# Patient Record
Sex: FEMALE | Race: BLACK OR AFRICAN AMERICAN | NOT HISPANIC OR LATINO | Employment: FULL TIME | URBAN - METROPOLITAN AREA
[De-identification: names, ages, dates, MRNs, and addresses within clinical notes are randomized per-mention and may not be internally consistent; named-entity substitution may affect disease eponyms.]

---

## 2021-10-28 ENCOUNTER — OFFICE VISIT (OUTPATIENT)
Dept: OBSTETRICS AND GYNECOLOGY | Facility: CLINIC | Age: 31
End: 2021-10-28
Payer: COMMERCIAL

## 2021-10-28 VITALS — DIASTOLIC BLOOD PRESSURE: 84 MMHG | WEIGHT: 259.69 LBS | SYSTOLIC BLOOD PRESSURE: 132 MMHG

## 2021-10-28 DIAGNOSIS — Z12.39 SCREENING BREAST EXAMINATION: ICD-10-CM

## 2021-10-28 DIAGNOSIS — N83.209 CYST OF OVARY, UNSPECIFIED LATERALITY: ICD-10-CM

## 2021-10-28 DIAGNOSIS — Z01.419 ENCOUNTER FOR GYNECOLOGICAL EXAMINATION WITHOUT ABNORMAL FINDING: Primary | ICD-10-CM

## 2021-10-28 PROCEDURE — 99385 PR PREVENTIVE VISIT,NEW,18-39: ICD-10-PCS | Mod: S$GLB,,, | Performed by: OBSTETRICS & GYNECOLOGY

## 2021-10-28 PROCEDURE — 87624 HPV HI-RISK TYP POOLED RSLT: CPT | Performed by: OBSTETRICS & GYNECOLOGY

## 2021-10-28 PROCEDURE — 99999 PR PBB SHADOW E&M-NEW PATIENT-LVL III: ICD-10-PCS | Mod: PBBFAC,,, | Performed by: OBSTETRICS & GYNECOLOGY

## 2021-10-28 PROCEDURE — 99999 PR PBB SHADOW E&M-NEW PATIENT-LVL III: CPT | Mod: PBBFAC,,, | Performed by: OBSTETRICS & GYNECOLOGY

## 2021-10-28 PROCEDURE — 99385 PREV VISIT NEW AGE 18-39: CPT | Mod: S$GLB,,, | Performed by: OBSTETRICS & GYNECOLOGY

## 2021-10-28 PROCEDURE — 88175 CYTOPATH C/V AUTO FLUID REDO: CPT | Performed by: OBSTETRICS & GYNECOLOGY

## 2021-10-28 RX ORDER — ESZOPICLONE 3 MG/1
3 TABLET, FILM COATED ORAL NIGHTLY PRN
COMMUNITY
Start: 2021-06-22 | End: 2022-10-12

## 2021-10-28 RX ORDER — DULOXETIN HYDROCHLORIDE 60 MG/1
120 CAPSULE, DELAYED RELEASE ORAL
COMMUNITY
Start: 2021-07-23

## 2021-11-02 ENCOUNTER — PATIENT MESSAGE (OUTPATIENT)
Dept: OBSTETRICS AND GYNECOLOGY | Facility: CLINIC | Age: 31
End: 2021-11-02
Payer: COMMERCIAL

## 2021-11-02 DIAGNOSIS — D25.9 UTERINE LEIOMYOMA, UNSPECIFIED LOCATION: Primary | ICD-10-CM

## 2021-11-02 RX ORDER — DROSPIRENONE AND ETHINYL ESTRADIOL 0.03MG-3MG
1 KIT ORAL DAILY
Qty: 28 TABLET | Refills: 11 | Status: SHIPPED | OUTPATIENT
Start: 2021-11-02 | End: 2022-11-10 | Stop reason: SDUPTHER

## 2021-11-08 ENCOUNTER — PATIENT MESSAGE (OUTPATIENT)
Dept: OBSTETRICS AND GYNECOLOGY | Facility: CLINIC | Age: 31
End: 2021-11-08
Payer: COMMERCIAL

## 2021-11-09 ENCOUNTER — PATIENT MESSAGE (OUTPATIENT)
Dept: OBSTETRICS AND GYNECOLOGY | Facility: CLINIC | Age: 31
End: 2021-11-09
Payer: COMMERCIAL

## 2021-12-13 ENCOUNTER — LAB VISIT (OUTPATIENT)
Dept: LAB | Facility: HOSPITAL | Age: 31
End: 2021-12-13
Attending: OBSTETRICS & GYNECOLOGY
Payer: COMMERCIAL

## 2021-12-13 ENCOUNTER — OFFICE VISIT (OUTPATIENT)
Dept: OBSTETRICS AND GYNECOLOGY | Facility: CLINIC | Age: 31
End: 2021-12-13
Payer: COMMERCIAL

## 2021-12-13 VITALS
WEIGHT: 260.38 LBS | BODY MASS INDEX: 41.85 KG/M2 | HEIGHT: 66 IN | DIASTOLIC BLOOD PRESSURE: 64 MMHG | SYSTOLIC BLOOD PRESSURE: 110 MMHG

## 2021-12-13 DIAGNOSIS — Z11.3 SCREEN FOR STD (SEXUALLY TRANSMITTED DISEASE): ICD-10-CM

## 2021-12-13 DIAGNOSIS — Z11.3 SCREEN FOR STD (SEXUALLY TRANSMITTED DISEASE): Primary | ICD-10-CM

## 2021-12-13 PROCEDURE — 99213 OFFICE O/P EST LOW 20 MIN: CPT | Mod: S$GLB,,, | Performed by: OBSTETRICS & GYNECOLOGY

## 2021-12-13 PROCEDURE — 86803 HEPATITIS C AB TEST: CPT | Performed by: OBSTETRICS & GYNECOLOGY

## 2021-12-13 PROCEDURE — 87591 N.GONORRHOEAE DNA AMP PROB: CPT | Performed by: OBSTETRICS & GYNECOLOGY

## 2021-12-13 PROCEDURE — 87491 CHLMYD TRACH DNA AMP PROBE: CPT | Mod: 59 | Performed by: OBSTETRICS & GYNECOLOGY

## 2021-12-13 PROCEDURE — 87389 HIV-1 AG W/HIV-1&-2 AB AG IA: CPT | Performed by: OBSTETRICS & GYNECOLOGY

## 2021-12-13 PROCEDURE — 86592 SYPHILIS TEST NON-TREP QUAL: CPT | Performed by: OBSTETRICS & GYNECOLOGY

## 2021-12-13 PROCEDURE — 99213 PR OFFICE/OUTPT VISIT, EST, LEVL III, 20-29 MIN: ICD-10-PCS | Mod: S$GLB,,, | Performed by: OBSTETRICS & GYNECOLOGY

## 2021-12-13 PROCEDURE — 87481 CANDIDA DNA AMP PROBE: CPT | Mod: 59 | Performed by: OBSTETRICS & GYNECOLOGY

## 2021-12-13 PROCEDURE — 99999 PR PBB SHADOW E&M-EST. PATIENT-LVL III: CPT | Mod: PBBFAC,,, | Performed by: OBSTETRICS & GYNECOLOGY

## 2021-12-13 PROCEDURE — 99999 PR PBB SHADOW E&M-EST. PATIENT-LVL III: ICD-10-PCS | Mod: PBBFAC,,, | Performed by: OBSTETRICS & GYNECOLOGY

## 2021-12-13 PROCEDURE — 87340 HEPATITIS B SURFACE AG IA: CPT | Performed by: OBSTETRICS & GYNECOLOGY

## 2021-12-13 PROCEDURE — 36415 COLL VENOUS BLD VENIPUNCTURE: CPT | Performed by: OBSTETRICS & GYNECOLOGY

## 2021-12-14 LAB
HBV SURFACE AG SERPL QL IA: NEGATIVE
HCV AB SERPL QL IA: NEGATIVE
HIV 1+2 AB+HIV1 P24 AG SERPL QL IA: NEGATIVE
RPR SER QL: NORMAL

## 2021-12-16 ENCOUNTER — PATIENT MESSAGE (OUTPATIENT)
Dept: OBSTETRICS AND GYNECOLOGY | Facility: CLINIC | Age: 31
End: 2021-12-16
Payer: COMMERCIAL

## 2021-12-16 DIAGNOSIS — B37.9 YEAST INFECTION: Primary | ICD-10-CM

## 2021-12-16 LAB
BACTERIAL VAGINOSIS DNA: NEGATIVE
CANDIDA GLABRATA DNA: NEGATIVE
CANDIDA KRUSEI DNA: NEGATIVE
CANDIDA RRNA VAG QL PROBE: POSITIVE
T VAGINALIS RRNA GENITAL QL PROBE: NEGATIVE

## 2021-12-16 RX ORDER — FLUCONAZOLE 150 MG/1
150 TABLET ORAL DAILY
Qty: 1 TABLET | Refills: 0 | Status: SHIPPED | OUTPATIENT
Start: 2021-12-16 | End: 2021-12-17

## 2021-12-17 LAB
C TRACH DNA SPEC QL NAA+PROBE: NOT DETECTED
N GONORRHOEA DNA SPEC QL NAA+PROBE: NOT DETECTED

## 2022-07-06 ENCOUNTER — HOSPITAL ENCOUNTER (EMERGENCY)
Facility: HOSPITAL | Age: 32
Discharge: HOME | End: 2022-07-06
Attending: EMERGENCY MEDICINE
Payer: MEDICAID

## 2022-07-06 VITALS
WEIGHT: 270 LBS | RESPIRATION RATE: 18 BRPM | DIASTOLIC BLOOD PRESSURE: 74 MMHG | HEIGHT: 66 IN | BODY MASS INDEX: 43.39 KG/M2 | HEART RATE: 86 BPM | OXYGEN SATURATION: 98 % | SYSTOLIC BLOOD PRESSURE: 129 MMHG | TEMPERATURE: 98 F

## 2022-07-06 DIAGNOSIS — R51.9 ACUTE INTRACTABLE HEADACHE, UNSPECIFIED HEADACHE TYPE: Primary | ICD-10-CM

## 2022-07-06 LAB
B-HCG UR QL: NEGATIVE
CTP QC/QA: YES

## 2022-07-06 PROCEDURE — 63600175 PHARM REV CODE 636 W HCPCS

## 2022-07-06 PROCEDURE — 81025 URINE PREGNANCY TEST: CPT | Performed by: EMERGENCY MEDICINE

## 2022-07-06 PROCEDURE — 96375 TX/PRO/DX INJ NEW DRUG ADDON: CPT | Mod: 59

## 2022-07-06 PROCEDURE — 96361 HYDRATE IV INFUSION ADD-ON: CPT | Mod: 59

## 2022-07-06 PROCEDURE — 99285 EMERGENCY DEPT VISIT HI MDM: CPT | Mod: 25

## 2022-07-06 PROCEDURE — 96374 THER/PROPH/DIAG INJ IV PUSH: CPT

## 2022-07-06 PROCEDURE — 25000003 PHARM REV CODE 250

## 2022-07-06 RX ORDER — PROCHLORPERAZINE EDISYLATE 5 MG/ML
10 INJECTION INTRAMUSCULAR; INTRAVENOUS
Status: COMPLETED | OUTPATIENT
Start: 2022-07-06 | End: 2022-07-06

## 2022-07-06 RX ORDER — BUTALBITAL, ACETAMINOPHEN AND CAFFEINE 50; 325; 40 MG/1; MG/1; MG/1
1 TABLET ORAL EVERY 4 HOURS PRN
Qty: 60 TABLET | Refills: 0 | Status: SHIPPED | OUTPATIENT
Start: 2022-07-06 | End: 2022-07-16

## 2022-07-06 RX ORDER — DIPHENHYDRAMINE HYDROCHLORIDE 50 MG/ML
25 INJECTION INTRAMUSCULAR; INTRAVENOUS
Status: COMPLETED | OUTPATIENT
Start: 2022-07-06 | End: 2022-07-06

## 2022-07-06 RX ORDER — KETOROLAC TROMETHAMINE 30 MG/ML
15 INJECTION, SOLUTION INTRAMUSCULAR; INTRAVENOUS
Status: COMPLETED | OUTPATIENT
Start: 2022-07-06 | End: 2022-07-06

## 2022-07-06 RX ORDER — ONDANSETRON 4 MG/1
4 TABLET, ORALLY DISINTEGRATING ORAL EVERY 6 HOURS PRN
Qty: 40 TABLET | Refills: 0 | Status: SHIPPED | OUTPATIENT
Start: 2022-07-06 | End: 2022-10-12

## 2022-07-06 RX ADMIN — SODIUM CHLORIDE 1000 ML: 0.9 INJECTION, SOLUTION INTRAVENOUS at 05:07

## 2022-07-06 RX ADMIN — DIPHENHYDRAMINE HYDROCHLORIDE 25 MG: 50 INJECTION, SOLUTION INTRAMUSCULAR; INTRAVENOUS at 05:07

## 2022-07-06 RX ADMIN — KETOROLAC TROMETHAMINE 15 MG: 30 INJECTION, SOLUTION INTRAMUSCULAR at 06:07

## 2022-07-06 RX ADMIN — PROCHLORPERAZINE EDISYLATE 10 MG: 5 INJECTION INTRAMUSCULAR; INTRAVENOUS at 05:07

## 2022-07-06 NOTE — DISCHARGE INSTRUCTIONS

## 2022-07-06 NOTE — FIRST PROVIDER EVALUATION
Emergency Department TeleTriage Encounter Note      CHIEF COMPLAINT    Chief Complaint   Patient presents with    Headache     Patient reports a headache that started x 4 days, nausea, shakiness, diaphoresis and sensitivity to lights/sounds. Patient denies vision changes, vomiting. Patient reports hx of migraines. Patient states that she took Excedrin migraine this morning and Goody's Powder about an hour pta.        VITAL SIGNS   Initial Vitals [07/06/22 1416]   BP Pulse Resp Temp SpO2   (!) 194/87 90 18 98.2 °F (36.8 °C) 98 %      MAP       --            ALLERGIES    Review of patient's allergies indicates:  No Known Allergies    PROVIDER TRIAGE NOTE  This is a teletriage evaluation of a 32 y.o. female presenting to the ED complaining of migraine. Patient reports headache that started 4 days ago. She has nausea, shakiness, and photophobia. She has history of migraines. This one is similar. She denies numbness, tingling, or weakness.    Initial orders will be placed and care will be transferred to an alternate provider when patient is roomed for a full evaluation. Any additional orders and the final disposition will be determined by that provider.           ORDERS  Labs Reviewed   POCT URINE PREGNANCY       ED Orders (720h ago, onward)    Start Ordered     Status Ordering Provider    07/06/22 1421 07/06/22 1420  POCT urine pregnancy  Once         Ordered YAZAN SOMMER            Virtual Visit Note: The provider triage portion of this emergency department evaluation and documentation was performed via [employer], a HIPAA-compliant telemedicine application, in concert with a tele-presenter in the room. A face to face patient evaluation with one of my colleagues will occur once the patient is placed in an emergency department room.      DISCLAIMER: This note was prepared with [employer] voice recognition transcription software. Garbled syntax, mangled pronouns, and other bizarre constructions may be attributed  to that software system.

## 2022-07-06 NOTE — ED PROVIDER NOTES
Encounter Date: 7/6/2022       History     Chief Complaint   Patient presents with    Headache     Patient reports a headache that started x 4 days, nausea, shakiness, diaphoresis and sensitivity to lights/sounds. Patient denies vision changes, vomiting. Patient reports hx of migraines. Patient states that she took Excedrin migraine this morning and Goody's Powder about an hour pta.      32-year-old female with past medical history of migraines presents the ED complaining of acute onset headache that started x3 days ago.  Patient is reporting pain behind her right eye with associated photophobia and phonophobia.  She states this feels like her usual migraines however worsening in intensity and has not resolved.  Patient has attempted Excedrin and goodies powder ( last dose 2-3 hrs PTA) with mild relief.  Patient denies nausea, blurred vision, fever, chills, cough, abdominal pain, emesis, diarrhea, dysuria, hematuria, shortness of breath, or chest pain.  No other symptoms reported.    The history is provided by the patient. No  was used.     Review of patient's allergies indicates:  No Known Allergies  Past Medical History:   Diagnosis Date    Anxiety      History reviewed. No pertinent surgical history.  History reviewed. No pertinent family history.  Social History     Tobacco Use    Smoking status: Never Smoker    Smokeless tobacco: Never Used   Substance Use Topics    Drug use: Never     Review of Systems   Constitutional: Negative for chills and fever.   HENT: Negative for congestion, ear pain, rhinorrhea and sore throat.    Eyes: Positive for photophobia. Negative for redness and visual disturbance.   Respiratory: Negative for cough and shortness of breath.    Cardiovascular: Negative for chest pain.   Gastrointestinal: Negative for abdominal pain, diarrhea, nausea and vomiting.   Genitourinary: Negative for decreased urine volume, difficulty urinating, dysuria, frequency, hematuria and  urgency.   Musculoskeletal: Negative for back pain and neck pain.   Skin: Negative for rash.   Neurological: Positive for headaches. Negative for dizziness, syncope and light-headedness.   Psychiatric/Behavioral: Negative for confusion.       Physical Exam     Initial Vitals [07/06/22 1416]   BP Pulse Resp Temp SpO2   (!) 194/87 90 18 98.2 °F (36.8 °C) 98 %      MAP       --         Physical Exam    Nursing note and vitals reviewed.  Constitutional: She appears well-developed and well-nourished.  Non-toxic appearance. She does not appear ill.   HENT:   Head: Normocephalic and atraumatic.   Mouth/Throat: Mucous membranes are normal.   Eyes: Conjunctivae and EOM are normal. Pupils are equal, round, and reactive to light.   Neck: Neck supple.   Normal range of motion.   Full passive range of motion without pain.     Cardiovascular: Normal rate and regular rhythm.   Pulmonary/Chest: Effort normal and breath sounds normal. No respiratory distress.   Abdominal: Abdomen is soft. Bowel sounds are normal. She exhibits no distension. There is no abdominal tenderness.   Musculoskeletal:         General: Normal range of motion.      Cervical back: Full passive range of motion without pain, normal range of motion and neck supple. No rigidity.     Neurological: She is alert. No cranial nerve deficit.   Neuro intact.  Strength and sensation intact to bilateral upper extremities and bilateral lower extremities.   Skin: Skin is warm and dry.   Psychiatric: She has a normal mood and affect.         ED Course   Procedures  Labs Reviewed   POCT URINE PREGNANCY          Imaging Results          CT Head Without Contrast (Final result)  Result time 07/06/22 18:28:20    Final result by Adam De MD (07/06/22 18:28:20)                 Impression:      No acute process.  Follow-up, as clinically warranted.      Electronically signed by: Adam De MD  Date:    07/06/2022  Time:    18:28             Narrative:    EXAMINATION:  CT HEAD  WITHOUT CONTRAST    CLINICAL HISTORY:  Headache, sudden, severe;    TECHNIQUE:  Low dose axial images were obtained through the head.  Coronal and sagittal reformations were also performed. Contrast was not administered.    COMPARISON:  None.    FINDINGS:  The subcutaneous tissues are unremarkable.  The bony calvarium is intact.  The paranasal sinuses are unremarkable.  The mastoid air cells are clear.  The orbits and intraorbital contents are unremarkable.    The craniocervical junction is intact.  The midline structures are unremarkable.  There are no extra-axial fluid collections.  There is no evidence of intracranial hemorrhage.  The ventricles and sulci are within normal limits.  The cisterns are unremarkable.  The gray-white differentiation is maintained.  There is no dense vessel sign.  There is no evidence of mass effect.                                 Medications   prochlorperazine injection Soln 10 mg (10 mg Intravenous Given 7/6/22 1736)   diphenhydrAMINE injection 25 mg (25 mg Intravenous Given 7/6/22 1736)   sodium chloride 0.9% bolus 1,000 mL (0 mLs Intravenous Stopped 7/6/22 1825)   ketorolac injection 15 mg (15 mg Intravenous Given 7/6/22 1830)     Medical Decision Making:   ED Management:  This is a 32-year-old female with past medical history of migraines presents the ED complaining of acute onset headache that started x3 days ago.  Patient is reporting pain behind her right eye with associated photophobia and phonophobia.  On physical exam, patient is well-appearing and in no acute distress.  Nontoxic appearing. Neuro intact.  Strength and sensation intact to bilateral upper extremities and bilateral lower extremities.      Differentials including but not limited to:  Subarachnoid hemorrhage, migraine headache, cluster headache, tension headache, temporal arteritis,    UPT negative.  CT head pending.  Ordered Compazine and Benadryl for pain and 1 L of fluids..  Will reassess.  Upon reassessment,  patient still reports pain.  Ordered Toradol.  CT head revealed no evidence of subarachnoid hemorrhage.  No acute processes.  Will discharge patient on Fioricet for migraine headache and Zofran as needed for nausea.    Strict return precautions given. I discussed with the patient/family the diagnosis, treatment plan, indications for return to the emergency department, and for expected follow-up. The patient/family verbalized an understanding. The patient/family is asked if there are any questions or concerns. We discuss the case, until all issues are addressed to the patient/familys satisfaction. Patient/family understands and is agreeable to the plan. Patient is stable and ready for discharge.                       Clinical Impression:   Final diagnoses:  [R51.9] Acute intractable headache, unspecified headache type (Primary)          ED Disposition Condition    Discharge Stable        ED Prescriptions     Medication Sig Dispense Start Date End Date Auth. Provider    butalbital-acetaminophen-caffeine -40 mg (FIORICET, ESGIC) -40 mg per tablet Take 1 tablet by mouth every 4 (four) hours as needed for Pain. 60 tablet 7/6/2022 7/16/2022 Stacy Huertas PA-C    ondansetron (ZOFRAN-ODT) 4 MG TbDL Take 1 tablet (4 mg total) by mouth every 6 (six) hours as needed (nausea). 40 tablet 7/6/2022  Stacy Huertas PA-C        Follow-up Information     Follow up With Specialties Details Why Contact Info    [facility]  Schedule an appointment as soon as possible for a visit in 2 days for further evaluation [address] [place] [address]  [phone]      [facility] Emergency Medicine In 2 days If symptoms worsen [address] [facility] [id number] [phone]           Stacy Heurtas PA-C  07/06/22 0834

## 2022-07-06 NOTE — Clinical Note
"Stephan "Stephan" Madison was seen and treated in our emergency department on 7/6/2022.  She may return to work on 07/07/2022.       If you have any questions or concerns, please don't hesitate to call.      Stacy Huertas PA-C"

## 2022-10-12 ENCOUNTER — OFFICE VISIT (OUTPATIENT)
Dept: FAMILY MEDICINE | Facility: CLINIC | Age: 32
End: 2022-10-12
Payer: COMMERCIAL

## 2022-10-12 DIAGNOSIS — G43.711 INTRACTABLE CHRONIC MIGRAINE WITHOUT AURA AND WITH STATUS MIGRAINOSUS: Primary | ICD-10-CM

## 2022-10-12 PROCEDURE — 99203 OFFICE O/P NEW LOW 30 MIN: CPT | Mod: 95,,, | Performed by: STUDENT IN AN ORGANIZED HEALTH CARE EDUCATION/TRAINING PROGRAM

## 2022-10-12 PROCEDURE — 99203 PR OFFICE/OUTPT VISIT, NEW, LEVL III, 30-44 MIN: ICD-10-PCS | Mod: 95,,, | Performed by: STUDENT IN AN ORGANIZED HEALTH CARE EDUCATION/TRAINING PROGRAM

## 2022-10-12 RX ORDER — SUMATRIPTAN SUCCINATE 100 MG/1
TABLET ORAL
Qty: 12 TABLET | Refills: 3 | Status: SHIPPED | OUTPATIENT
Start: 2022-10-12 | End: 2023-01-25

## 2022-10-12 NOTE — PROGRESS NOTES
10/12/2022    Stephan Wallace  [id number]    CC: headache    HPI    Migraine present for the last couple of days. Is on a search to find a neurologist but needs a new neurologist. Ha stried excedrin migraine but not working. Needs refill of sumitriptan.      Negative 10 point ROS outside of HPI    Social History     Socioeconomic History    Marital status: Single   Tobacco Use    Smoking status: Never    Smokeless tobacco: Never   Substance and Sexual Activity    Drug use: Never    Sexual activity: Yes     Partners: Male     Birth control/protection: OCP           Current Outpatient Medications:     drospirenone-ethinyl estradioL (FRANCESCA, 28,) 3-0.03 mg per tablet, Take 1 tablet by mouth once daily., Disp: 28 tablet, Rfl: 11    DULoxetine (CYMBALTA) 60 MG capsule, Take 120 mg by mouth., Disp: , Rfl:     eszopiclone (LUNESTA) 3 mg Tab, Take 3 mg by mouth nightly as needed., Disp: , Rfl:     ondansetron (ZOFRAN-ODT) 4 MG TbDL, Take 1 tablet (4 mg total) by mouth every 6 (six) hours as needed (nausea)., Disp: 40 tablet, Rfl: 0      PHYSICAL EXAM    Vitals unable to obtain    Gen: well appearing  Resp: speaks in full sentences. Non labored breathing  Cv: non-diaphoretic    1. Intractable chronic migraine without aura and with status migrainosus  - REFILLED sumatriptan (IMITREX) 100 MG tablet; Take 1 tab at first sign of migraine may repeat 2 hours later if needed  Dispense: 12 tablet; Refill: 3        RTC for routine care    Narda Bhatia MD  Family Medicine

## 2022-12-30 ENCOUNTER — TELEPHONE (OUTPATIENT)
Dept: NEUROLOGY | Facility: CLINIC | Age: 32
End: 2022-12-30
Payer: COMMERCIAL

## 2022-12-30 NOTE — TELEPHONE ENCOUNTER
----- Message from Tremontana Chevalier sent at 12/30/2022  4:21 PM CST -----  Regarding: appt   Pt calling to sched appt with Morenita Woodard for np with migraines. Pls cll pt @ [phone]

## 2023-01-03 NOTE — TELEPHONE ENCOUNTER
Pt called asking to make an appt w Dr. Woodard for migraines. I explained that Dr. Woodard primarily focuses on stroke patients and that a general neurologist, or provider in the headache clinic would be more appropriate. I offered appts with Dr. Rogers and Dr. Reed, but they would not be available until February and the pt did not want to wait that long. I offered the pt an appointment next week with Dr. Gotti at [facility], but the pt did not want to drive that far. I offered an appt in late January but the pt still did not want to wait until then. Pt stated that she would make some other calls and call back if need be.

## 2023-01-10 ENCOUNTER — TELEPHONE (OUTPATIENT)
Dept: NEUROLOGY | Facility: CLINIC | Age: 33
End: 2023-01-10
Payer: COMMERCIAL

## 2023-01-10 NOTE — TELEPHONE ENCOUNTER
----- Message from Chanda Mota sent at 1/10/2023  8:48 AM CST -----  Regarding: appt  Contact: [phone]  Pt is requesting to reschedule her appt for today at 3:00 pm. Please call to discuss further.

## 2023-01-25 ENCOUNTER — PATIENT MESSAGE (OUTPATIENT)
Dept: NEUROLOGY | Facility: CLINIC | Age: 33
End: 2023-01-25

## 2023-01-25 ENCOUNTER — OFFICE VISIT (OUTPATIENT)
Dept: NEUROLOGY | Facility: CLINIC | Age: 33
End: 2023-01-25
Payer: COMMERCIAL

## 2023-01-25 VITALS
DIASTOLIC BLOOD PRESSURE: 91 MMHG | BODY MASS INDEX: 43.39 KG/M2 | HEIGHT: 66 IN | WEIGHT: 270 LBS | SYSTOLIC BLOOD PRESSURE: 136 MMHG | HEART RATE: 112 BPM

## 2023-01-25 DIAGNOSIS — G43.109 MIGRAINE WITH AURA AND WITHOUT STATUS MIGRAINOSUS, NOT INTRACTABLE: Primary | ICD-10-CM

## 2023-01-25 PROCEDURE — 99204 OFFICE O/P NEW MOD 45 MIN: CPT | Mod: S$GLB,,, | Performed by: PHYSICIAN ASSISTANT

## 2023-01-25 PROCEDURE — 99204 PR OFFICE/OUTPT VISIT, NEW, LEVL IV, 45-59 MIN: ICD-10-PCS | Mod: S$GLB,,, | Performed by: PHYSICIAN ASSISTANT

## 2023-01-25 PROCEDURE — 99999 PR PBB SHADOW E&M-EST. PATIENT-LVL III: CPT | Mod: PBBFAC,,, | Performed by: PHYSICIAN ASSISTANT

## 2023-01-25 PROCEDURE — 99999 PR PBB SHADOW E&M-EST. PATIENT-LVL III: ICD-10-PCS | Mod: PBBFAC,,, | Performed by: PHYSICIAN ASSISTANT

## 2023-01-25 RX ORDER — SUMATRIPTAN SUCCINATE 100 MG/1
TABLET ORAL
Qty: 12 TABLET | Refills: 5 | Status: SHIPPED | OUTPATIENT
Start: 2023-01-25 | End: 2023-06-20

## 2023-01-25 NOTE — PROGRESS NOTES
New Patient     SUBJECTIVE:  Patient ID: Stephan Wallace   MRN: [id number]  Referred By: Aaareferral Self  Chief Complaint: Headache      History of Present Illness:   32 y.o. female with migraines, depression, anxiety, who presents to clinic alone for evaluation of headaches.   PMHx negative for TBI, Meningitis, Aneurysms, Kidney Stones, asthma, GI bleed, osteoporosis, CAD/MI, CVA/TIA, DM, cancer  Family Hx negative for Migraines     Pt has been suffering from migraines since 11yo previously seen by Dr. Canales and Dr. Gatica, is new to me. She was started on imitrex which typically rsolves ha's effectively. However, pt would like to discuss preventative options today due to an increase in their frequency over the last 2 yrs.   Location/Radiation - unilateral retro-orbital  Quality - pressure  Duration - hours - 3 days  Intensity (range) - 1-8/10  Frequency - 1-3 days a week, 8-12/30 ha days per month, 1-2/30 are debilitating  Triggers - hunger/missed meal, loud noises, prolonged screen use,   Aggravating Factors - loud noise, lights, smells  Recent Changes - no  Prodrome/Aura - sees lights in vision  Associated symptoms with the headache: phonophobia, photophobia, osmophobia ,nausea    Treatments Tried   Tpx - face tingling  Cymbalta 60mg/d - helps depression/anxiety  Imitrex 100mg - helps  Excedrin  Advil     Social History  Alcohol - quit recently  Smoke - denies  Recreational Drug Use- d/c marijuana use recently  Occupation -     Current Medications:    Current Outpatient Medications:     drospirenone-ethinyl estradioL (FRANCESCA, 28,) 3-0.03 mg per tablet, Take 1 tablet by mouth once daily., Disp: 84 tablet, Rfl: 0    DULoxetine (CYMBALTA) 60 MG capsule, Take 120 mg by mouth., Disp: , Rfl:     galcanezumab-gnlm 120 mg/mL PnIj, Inject 120 mg into the skin every 28 days. Begin 28 days after loading dose., Disp: 1 mL, Rfl: 5    galcanezumab-gnlm 120 mg/mL PnIj, Inject 240 mg into the skin once.  "for 1 dose, Disp: 2 mL, Rfl: 0    sumatriptan (IMITREX) 100 MG tablet, Take 1 tab at first sign of migraine may repeat 2 hours later if needed, Disp: 12 tablet, Rfl: 5    Review of Systems - as per HPI, otherwise a balanced 10 systems review is negative.    OBJECTIVE:  Vitals:  BP (!) 136/91   Pulse (!) 112   Ht 5' 6" (1.676 m)   Wt 122.5 kg (270 lb)   BMI 43.58 kg/m²     Physical Exam   Constitutional: she appears well-developed and well-nourished. she is well groomed. NAD  HENT:    Head: Normocephalic and atraumatic, Frontalis was NTTP, temporalis was NTTP   Eyes: Conjunctivae and EOM are normal. Pupils are equal, round, and reactive to light   Neck: Neck supple. Occiput and trapezius NTTP   Musculoskeletal: Normal range of motion. No joint stiffness. No vertebral point tenderness.  Skin: Skin is warm and dry.  Psychiatric: Normal mood and affect.     Neuro exam:    Mental status:  The patient is alert and oriented to person, place and time.  Language is intact and fluent  Remote and recent memory are intact  Normal attention and concentration  Mood is stable    Cranial Nerves:  Pupils are equal and reactive to light.    Extraocular movements are intact and without nystagmus.    Facial movement is symmetric.  Facial sensation is intact.    Hearing is intact   FROM of neck in all (6) directions without pain  Shoulder shrug symmetrical.    Coordination:     Finger to nose - normal and symmetric bilaterally     Motor:  Normal muscle bulk and symmetry. No fasciculations were noted.   Tremor not apparent   Pronator drift not apparent.    strength was strong and symmetric  Finger extension strength was strong and symmetric  RUE:appropriate against gravity and medium force as tested 5/5  LUE: appropriate against gravity and medium force as tested 5/5  RLE:appropriate against gravity and medium force as tested 5/5              LLE: appropriate against gravity and medium force as tested 5/5    Sensory:  RUE  intact " light touch  LUE intact light touch  RLE intact light touch  LLE intact light touch    Gait:   Normal gait    Review of Data:   Notes from ed, neuro reviewed   Labs:  No visits with results within 3 Month(s) from this visit.   Latest known visit with results is:   Admission on 07/06/2022, Discharged on 07/06/2022   Component Date Value Ref Range Status    POC Preg Test, Ur 07/06/2022 Negative  Negative Final     Acceptable 07/06/2022 Yes   Final     Imaging:  Results for orders placed or performed during the hospital encounter of 07/06/22   CT Head Without Contrast    Narrative    EXAMINATION:  CT HEAD WITHOUT CONTRAST    CLINICAL HISTORY:  Headache, sudden, severe;    TECHNIQUE:  Low dose axial images were obtained through the head.  Coronal and sagittal reformations were also performed. Contrast was not administered.    COMPARISON:  None.    FINDINGS:  The subcutaneous tissues are unremarkable.  The bony calvarium is intact.  The paranasal sinuses are unremarkable.  The mastoid air cells are clear.  The orbits and intraorbital contents are unremarkable.    The craniocervical junction is intact.  The midline structures are unremarkable.  There are no extra-axial fluid collections.  There is no evidence of intracranial hemorrhage.  The ventricles and sulci are within normal limits.  The cisterns are unremarkable.  The gray-white differentiation is maintained.  There is no dense vessel sign.  There is no evidence of mass effect.      Impression    No acute process.  Follow-up, as clinically warranted.      Electronically signed by: Adam De MD  Date:    07/06/2022  Time:    18:28     Note: I have independently reviewed any/all imaging/labs/tests and agree with the report (s) as documented.  Any discrepancies will be as noted/demarcated by free text.  JOSUÉ RODRIGUEZ 1/25/2023    ASSESSMENT:  1. Migraine with aura and without status migrainosus, not intractable          PLAN:  - Discussed symptoms appear  to be consistent with migraines, discussed treatment options and patient agreed with the following plan:  - ppx - start emgality  - abortive - continue imitrex 100mg tab prn  - anxiety/depression - continue cymbalta 60mg/d, mgmt per psychiatrist  - risks, benefits, and potential side effects of emgality, imitrex discussed   - alternative treatment options offered   - importance of healthy diet, regular exercise and sleep hygiene in the treatment of headaches    - Start tracking headaches via Migraine Buddy ifrah on phone   - RTC in 4 mo or sooner if needed     Orders Placed This Encounter    sumatriptan (IMITREX) 100 MG tablet    galcanezumab-gnlm 120 mg/mL PnIj    galcanezumab-gnlm 120 mg/mL PnIj       I have discussed realistic goals of care with patient at length as well as medication options, and need for lifestyle adjustment. I have explained that treatment will take time. We have agreed that the goal will be to reduce frequency/intensity/quantity of HA, not to be completely HA free. I have explained my non narcotic policy regarding headache treatment.    Patient agreeable to work on lifestyle adjustments.    Discussed potential for teratogenicity with treatment, patient understands if her family planning status should change she will contact office immediately and we will safely adjust medications as needed.     Questions and concerns were sought and answered to the patient's stated verbal satisfaction.  The patient verbalizes understanding and agreement with the above stated treatment plan.     CC: Primary Doctor Kanwal Gudino PA-C  [facility]  [phone]    Dr. Doyle was available during today's encounter.

## 2023-01-25 NOTE — PATIENT INSTRUCTIONS
Supplements for Migraine:  1. Magnesium Oxide - 400mg by mouth daily  2. Riboflavin (Vitamin B2) - 400mg by mouth daily  3. Coenzyme Q10 - 200mg tablet by mouth daily    - Please download Migraine Alexis ifrah on phone and begin tracking your headaches

## 2023-04-11 ENCOUNTER — PATIENT MESSAGE (OUTPATIENT)
Dept: RESEARCH | Facility: HOSPITAL | Age: 33
End: 2023-04-11
Payer: COMMERCIAL

## 2023-06-09 ENCOUNTER — TELEPHONE (OUTPATIENT)
Dept: NEUROLOGY | Facility: CLINIC | Age: 33
End: 2023-06-09
Payer: COMMERCIAL

## 2023-06-09 NOTE — TELEPHONE ENCOUNTER
----- Message from Marley Gudino PA-C sent at 6/9/2023 12:55 PM CDT -----  Yes, thanks!  ----- Message -----  From: Prem Null MA  Sent: 6/9/2023   8:38 AM CDT  To: Marley Gudino PA-C    Went to ER for dystonia. Follow up with gen neuro/resident clinic?  ----- Message -----  From: Amelia Velasquez  Sent: 6/9/2023   8:33 AM CDT  To: Shahab Roach Staff    Type: Appointment Request     Name of Caller: Pt   When is the first available appointment? Do not have access  Reason for Visit:  ER follow up muscle spasms in face and upper body   Best Call Back Number: [phone]  Additional Information:

## 2023-06-15 ENCOUNTER — OFFICE VISIT (OUTPATIENT)
Dept: NEUROLOGY | Facility: CLINIC | Age: 33
End: 2023-06-15
Payer: COMMERCIAL

## 2023-06-15 ENCOUNTER — TELEPHONE (OUTPATIENT)
Dept: NEUROLOGY | Facility: CLINIC | Age: 33
End: 2023-06-15
Payer: COMMERCIAL

## 2023-06-15 VITALS
BODY MASS INDEX: 47.09 KG/M2 | DIASTOLIC BLOOD PRESSURE: 93 MMHG | WEIGHT: 293 LBS | SYSTOLIC BLOOD PRESSURE: 136 MMHG | HEART RATE: 96 BPM | HEIGHT: 66 IN

## 2023-06-15 DIAGNOSIS — F44.4 FUNCTIONAL NEUROLOGICAL SYMPTOM DISORDER (CONVERSION DISORDER), WITH ABNORMAL MOVEMENT: Primary | ICD-10-CM

## 2023-06-15 DIAGNOSIS — F95.0 TRANSIENT TICS: ICD-10-CM

## 2023-06-15 DIAGNOSIS — H51.9: ICD-10-CM

## 2023-06-15 PROCEDURE — 99214 PR OFFICE/OUTPT VISIT, EST, LEVL IV, 30-39 MIN: ICD-10-PCS | Mod: S$GLB,,, | Performed by: PSYCHIATRY & NEUROLOGY

## 2023-06-15 PROCEDURE — 99999 PR PBB SHADOW E&M-EST. PATIENT-LVL IV: ICD-10-PCS | Mod: PBBFAC,,,

## 2023-06-15 PROCEDURE — 99214 OFFICE O/P EST MOD 30 MIN: CPT | Mod: S$GLB,,, | Performed by: PSYCHIATRY & NEUROLOGY

## 2023-06-15 PROCEDURE — 99999 PR PBB SHADOW E&M-EST. PATIENT-LVL IV: CPT | Mod: PBBFAC,,,

## 2023-06-15 NOTE — TELEPHONE ENCOUNTER
Good morning,     Dr. Jc has ordered a referral for this patient to Neuro[facility].  I did let them know there is a waitlist.      Can you help out with getting them scheduled?    Thanks!  Ace CERON

## 2023-06-15 NOTE — PATIENT INSTRUCTIONS
Dear Ms Stephan Wallace. Thank you for taking the time to meet with us. To summarize, you've been experiencing movements in your face, eyes, and hands that you can't control, along with rarely occuring impact on your speech. These symptoms have been present for the past six months, but have been more intense over the past week. We understand that these episodes can be quite distressing for you, especially given that they can last for several minutes and have become more frequent recently.    We considered a few possible causes for these symptoms. One is a functional movement disorder, which could be related to your previous psychiatric conditions, like depression and the recent episode of psychosis. Another possibility is Tourette syndrome, as your symptoms have some resemblance to this condition. However, Tourette syndrome usually starts during childhood, making it a less likely explanation.     Our plan moving forward is to connect you with a neuropsychiatric specialist who can help us figure out what's going on. They have a deeper understanding of conditions that involve both the brain and behavior, and will be better equipped to explore these possibilities and help you overcome them. This will help us formulate a treatment plan tailored specifically to your needs.    In the meantime, we recommend you continue working with your psychiatrist. Please call our office to schedule a follow up if your symptoms persist, or if you develop any new symptoms.     Thank you

## 2023-06-15 NOTE — PROGRESS NOTES
[facility] - NEUROLOGY 7TH [address] [facility], [place]    Date: 6/15/23  Patient Name: Stephan Wallace   MRN: [id number]   PCP: Primary Doctor No  Referring Provider: No ref. provider found    Assessment:   Stephan Wallace is a 32 y.o. female with a PMHx of migraines (on sumatriptan, headaches are better than they have been in the past), presenting with a 6-month history of intermittent facial, eye movement and bilateral hand tics. Mouth movements appear as smiles. Hand movements include extension of fingers, and movement of fingers side to side, occasionally sequential flexion of the fingers (1st to 5th digit and back). It can also sometimes affects her speech.  Symptoms began in Jan 2023, occurring intermittently, frequently, but lasting a couple of minutes. However, since last week, she has begun having abnormal raising of bilateral eyebrows, eye movements (when she looks in a particular direction, she feels her eyes pull in a different direction, without consistent laterality). Last Tuesday and Wednesday, the episodes lasted 2 days. She has had no recent changes in medication, no history of head injury, or other significant neurological signs or symptoms.    She was started on abilify 2mg Aripiprazole (abilify) 2mg QD by Dr. Collins, her psychiatrist, for acute-on-chronic depression to augment her existing duloxetine (cymbalta) 120mg QD. She has since been advised to stop the medication.     The episodes are worsened by stress. But she denies worsened stress this past week compared to 6 months ago. In January, she had an episode of psychosis (with auditory hallucinations), and these movements became much worse, and she had the urge to vocalize.    She was in the ER for this and was started on benadryl (she has stopped taking this because she reports SOB at night with this medication). She was also started on baclofen, which she thinks might be helping.    Possible causes  include:  Functional movement disorder: The patient's history of psychiatric illness (depression and a recent psychotic episode) and the distinct presentation makes this a possibility.  Tourette syndrome: Given the patient's multiple motor tics and history of urge to vocalize during periods of stress, could be indicative of Tourette syndrome. However, the typical age of onset is usually in childhood, not adulthood, and the large variability of her movements make a functional neurologic disorder more likely.  Tardive dyskinesia: This disorder is characterized by involuntary movements of the face and extremities, typically as a result of long-term antipsychotic treatment. Aripiprazole (Abilify), which the patient has been taking, has been associated with tardive dyskinesia. However, the nature of her movements make this least likely of the 3.    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 Stephan Wallace is a 32 y.o. female with a PMHx of migraines (on sumatriptan, headaches are better than they have been in the past), presenting with a 6-month history of intermittent facial, eye movement and bilateral hand tics. Mouth movements appear as smiles. Hand movements include extension of fingers, and movement of fingers side to side, occasionally sequential flexion of the fingers  (1st to 5th digit and back). It can also sometimes affects her speech.  Symptoms began in Jan 2023, occurring intermittently, frequently, but lasting a couple of minutes. However, since last week, she has begun having abnormal raising of bilateral eyebrows, eye movements (when she looks in a particular direction, she feels her eyes pull in a different direction, without consistent laterality). Last Tuesday and Wednesday, the episodes lasted 2 days. She has had no recent changes in medication, no history of head injury, or other significant neurological signs or symptoms.    She was started by Dr. Collins, her psychiatrist, on abilify 2mg Aripiprazole (abilify) 2mg QD for acute-on-chronic depression to augment her existing duloxetine (cymbalta) 120mg QD. She has since been advised to stop the medication.     The episodes are worsened by stress. But she denies worsened stress this past week compared to 6 months ago. In January, she had an episode of psychosis (with auditory hallucinations), and these movements became much worse, and she had the urge to vocalize.    She was in the ER for this and was started on benadryl (she has stopped taking this because she reports SOB at night with this medication). She was also started on baclofen, which she thinks might be helping.    PAST MEDICAL HISTORY:  Past Medical History:   Diagnosis Date    Anxiety     Depression        PAST SURGICAL HISTORY:  No past surgical history on file.    CURRENT MEDS:  Current Outpatient Medications   Medication Sig Dispense Refill    baclofen (LIORESAL) 10 MG tablet Take 1 tablet (10 mg total) by mouth 3 (three) times daily as needed (muscle spasm). 30 tablet 0    diphenhydrAMINE (BENADRYL) 50 MG capsule Take 1 capsule (50 mg total) by mouth every 6 (six) hours as needed (muscle spasm). 20 capsule 0    drospirenone-ethinyl estradioL (FRANCESCA) 3-0.03 mg per tablet TAKE 1 TABLET BY MOUTH EVERY DAY 84 tablet 0    DULoxetine (CYMBALTA) 60 MG capsule  "Take 120 mg by mouth.      galcanezumab-gnlm 120 mg/mL PnIj Inject 120 mg into the skin every 28 days. Begin 28 days after loading dose. 1 mL 5    sumatriptan (IMITREX) 100 MG tablet Take 1 tab at first sign of migraine may repeat 2 hours later if needed 12 tablet 5     No current facility-administered medications for this visit.       ALLERGIES:  Review of patient's allergies indicates:  No Known Allergies    FAMILY HISTORY:  No family history on file.    SOCIAL HISTORY:  Social History     Tobacco Use    Smoking status: Never    Smokeless tobacco: Never   Substance Use Topics    Drug use: Never       Review of Systems:  12 system review of systems is negative except for the symptoms mentioned in HPI.        Objective:     Vitals:    06/15/23 0833   BP: (!) 136/93   Pulse: 96   Weight: 135.6 kg (299 lb)   Height: 5' 6" (1.676 m)     General: NAD, well nourished   Eyes: no tearing, discharge, no erythema   ENT: moist mucous membranes of the oral cavity, nares patent    Neck: Supple, full range of motion  Cardiovascular: Warm and well perfused, pulses equal and symmetrical  Lungs: Normal work of breathing, normal chest wall excursions  Skin: No rash, lesions, or breakdown on exposed skin  Psychiatry: Mood and affect are appropriate   Abdomen: soft, non tender, non distended  Extremeties: No cyanosis, clubbing or edema.    Neurological   MENTAL STATUS: Alert and oriented to person, place, and time. Attention and concentration within normal limits. Speech without dysarthria, able to name and repeat without difficulty. Recent and remote memory within normal limits   CRANIAL NERVES: Visual fields intact. PERRL. EOMI. Facial sensation intact. Face symmetrical. Hearing grossly intact. Full shoulder shrug bilaterally. Tongue protrudes midline   Abnormal abrupt change in direction of the gaze noted on EOM testing, without particular laterality or consistent direction. Occasional raising of eyebrows, and mouth movements " resembling a smile; and less frequently, movement of fingertips in right and left hand. Patient had an episode with difficulty initiating speech that resolved within seconds.  SENSORY: Sensation is intact to light touch throughout.  Joint position perception intact. Negative Romberg.   MOTOR: Normal bulk and tone. No pronator drift.  5/5 deltoid, biceps, triceps, interosseous, hand  bilaterally. 5/5 iliopsoas, knee extension/flexion, foot dorsi/plantarflexion bilaterally.    REFLEXES: Symmetric and 2+ throughout; 3+ in bilateral patella with minimally positive cross adductors. Toes down going bilaterally.   CEREBELLAR/COORDINATION/GAIT: Gait steady with normal arm swing and stride length.  Heel to shin intact. Finger to nose intact. Normal rapid alternating movements.

## 2023-06-20 ENCOUNTER — OFFICE VISIT (OUTPATIENT)
Dept: NEUROLOGY | Facility: CLINIC | Age: 33
End: 2023-06-20
Payer: COMMERCIAL

## 2023-06-20 VITALS
BODY MASS INDEX: 47.09 KG/M2 | WEIGHT: 293 LBS | DIASTOLIC BLOOD PRESSURE: 99 MMHG | HEIGHT: 66 IN | HEART RATE: 89 BPM | SYSTOLIC BLOOD PRESSURE: 143 MMHG

## 2023-06-20 DIAGNOSIS — G43.109 MIGRAINE WITH AURA AND WITHOUT STATUS MIGRAINOSUS, NOT INTRACTABLE: ICD-10-CM

## 2023-06-20 PROCEDURE — 99999 PR PBB SHADOW E&M-EST. PATIENT-LVL III: CPT | Mod: PBBFAC,,, | Performed by: PHYSICIAN ASSISTANT

## 2023-06-20 PROCEDURE — 99214 PR OFFICE/OUTPT VISIT, EST, LEVL IV, 30-39 MIN: ICD-10-PCS | Mod: S$GLB,,, | Performed by: PHYSICIAN ASSISTANT

## 2023-06-20 PROCEDURE — 99214 OFFICE O/P EST MOD 30 MIN: CPT | Mod: S$GLB,,, | Performed by: PHYSICIAN ASSISTANT

## 2023-06-20 PROCEDURE — 99999 PR PBB SHADOW E&M-EST. PATIENT-LVL III: ICD-10-PCS | Mod: PBBFAC,,, | Performed by: PHYSICIAN ASSISTANT

## 2023-06-20 RX ORDER — SUMATRIPTAN SUCCINATE 100 MG/1
TABLET ORAL
Qty: 12 TABLET | Refills: 5 | Status: SHIPPED | OUTPATIENT
Start: 2023-06-20 | End: 2023-11-13 | Stop reason: SDUPTHER

## 2023-06-20 NOTE — PROGRESS NOTES
Established Patient   SUBJECTIVE:  Patient ID: Stephan Wallace   Chief Complaint: Headache    History of Present Illness:  Stephan Wallace is a 32 y.o. female with migraines, depression, anxiety who presents to clinic alone for follow-up of headaches.       06/20/2023 - Interval History:  Pt completed 3 rounds of emgality after last visit which helped greatly. She stopped taking it approximately 3 months ago. Ha's have been well controlled since last visit, occurring 1-2 times a month. Resolvign w/ imitrex.   Continues to follow w/ neurology regarding abnormal movements.   Plan: continue imitrex prn, continue neuro recs, rtc prn    Recommendations made at last Office Visit on 1/25/23:  - Discussed symptoms appear to be consistent with migraines, discussed treatment options and patient agreed with the following plan:  - ppx - start emgality  - abortive - continue imitrex 100mg tab prn  - anxiety/depression - continue cymbalta 60mg/d, mgmt per psychiatrist  - risks, benefits, and potential side effects of emgality, imitrex discussed   - alternative treatment options offered   - importance of healthy diet, regular exercise and sleep hygiene in the treatment of headaches    - Start tracking headaches via Migraine Buddy ifrah on phone   - RTC in 4 mo or sooner if needed     Treatments Tried:  Emgality - completed 3 rounds, helped, no se's  Tpx - face tingling  Cymbalta 60mg/d - helps depression/anxiety  Imitrex 100mg - helps  Excedrin  Advil     Current Medications:    Current Outpatient Medications:     diphenhydrAMINE (BENADRYL) 50 MG capsule, Take 1 capsule (50 mg total) by mouth every 6 (six) hours as needed (muscle spasm)., Disp: 20 capsule, Rfl: 0    drospirenone-ethinyl estradioL (FRANCESCA) 3-0.03 mg per tablet, TAKE 1 TABLET BY MOUTH EVERY DAY, Disp: 84 tablet, Rfl: 0    DULoxetine (CYMBALTA) 60 MG capsule, Take 120 mg by mouth., Disp: , Rfl:     baclofen (LIORESAL) 10 MG tablet, Take 1 tablet (10 mg total) by  "mouth 3 (three) times daily as needed (muscle spasm)., Disp: 30 tablet, Rfl: 0    sumatriptan (IMITREX) 100 MG tablet, Take 1 tab at first sign of migraine may repeat 2 hours later if needed, Disp: 12 tablet, Rfl: 5    Review of Systems - as per HPI, otherwise a balanced 10 systems review is negative.    OBJECTIVE:  Vitals:  BP (!) 143/99   Pulse 89   Ht 5' 6" (1.676 m)   Wt 134.7 kg (297 lb)   LMP 06/05/2023   BMI 47.94 kg/m²      Physical Exam:  Constitutional: she appears well-developed and well-nourished. she is well groomed. NAD   HENT:    Head: Normocephalic and atraumatic  Eyes: Conjunctivae and EOM are normal  Musculoskeletal: Normal range of motion. No joint stiffness.   Skin: Skin is warm and dry.  Psychiatric: Mood and affect are normal    Neuro: Patient is alert and oriented to person, place, and time. Language is intact and fluent. Speech is clear and fluent. Recent and remote memory are intact.  Normal attention and concentration.  Facial movement is symmetric. Moves all 4 extremities against gravity. Gait and station normal.  Cranial Nerves II through XII without focal deficit.     Review of Data:   Notes from neuro reviewed   Labs:  Admission on 06/07/2023, Discharged on 06/07/2023   Component Date Value Ref Range Status    POC Preg Test, Ur 06/07/2023 Negative  Negative Final     Acceptable 06/07/2023 Yes   Final     Imaging:  Results for orders placed or performed during the hospital encounter of 07/06/22   CT Head Without Contrast    Narrative    EXAMINATION:  CT HEAD WITHOUT CONTRAST    CLINICAL HISTORY:  Headache, sudden, severe;    TECHNIQUE:  Low dose axial images were obtained through the head.  Coronal and sagittal reformations were also performed. Contrast was not administered.    COMPARISON:  None.    FINDINGS:  The subcutaneous tissues are unremarkable.  The bony calvarium is intact.  The paranasal sinuses are unremarkable.  The mastoid air cells are clear.  The orbits " and intraorbital contents are unremarkable.    The craniocervical junction is intact.  The midline structures are unremarkable.  There are no extra-axial fluid collections.  There is no evidence of intracranial hemorrhage.  The ventricles and sulci are within normal limits.  The cisterns are unremarkable.  The gray-white differentiation is maintained.  There is no dense vessel sign.  There is no evidence of mass effect.      Impression    No acute process.  Follow-up, as clinically warranted.      Electronically signed by: Adam De MD  Date:    07/06/2022  Time:    18:28     Note: I have independently reviewed any/all imaging/labs/tests and agree with the report (s) as documented.  Any discrepancies will be as noted/demarcated by free text.  JOSUÉ RODRIGUEZ 6/20/2023    ASSESSMENT:  1. Migraine with aura and without status migrainosus, not intractable        PLAN:  - Discussed symptoms appear to be consistent with migraines, discussed treatment options and patient agreed with the following plan:  - abortive - continue imitrex 100mg tab prn  - anxiety/depression - continue cymbalta 60mg/d, mgmt per psychiatrist  - abnormal movements - mgmt per neuro  - Continue tracking headaches   - Discussed goals of therapy are to decrease the frequency, intensity, and duration of headaches  - RTC prn     Orders Placed This Encounter    sumatriptan (IMITREX) 100 MG tablet     Questions and concerns were sought and answered to the patient's stated verbal satisfaction.  The patient verbalizes understanding and agreement with the above stated treatment plan.     CC: Primary Doctor No      Marley Gudino PA-C  [facility]   [phone]    Dr. Doyle was available during today's encounter.
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
## 2023-06-29 ENCOUNTER — OFFICE VISIT (OUTPATIENT)
Dept: NEUROLOGY | Facility: CLINIC | Age: 33
End: 2023-06-29
Payer: COMMERCIAL

## 2023-06-29 DIAGNOSIS — F41.1 GENERALIZED ANXIETY DISORDER WITH PANIC ATTACKS: ICD-10-CM

## 2023-06-29 DIAGNOSIS — F33.1 MODERATE EPISODE OF RECURRENT MAJOR DEPRESSIVE DISORDER: ICD-10-CM

## 2023-06-29 DIAGNOSIS — F41.0 GENERALIZED ANXIETY DISORDER WITH PANIC ATTACKS: ICD-10-CM

## 2023-06-29 DIAGNOSIS — F44.4 FUNCTIONAL NEUROLOGICAL SYMPTOM DISORDER (CONVERSION DISORDER), WITH ABNORMAL MOVEMENT: Primary | ICD-10-CM

## 2023-06-29 DIAGNOSIS — F34.1 DYSTHYMIC DISORDER: ICD-10-CM

## 2023-06-29 PROCEDURE — 90791 PR PSYCHIATRIC DIAGNOSTIC EVALUATION: ICD-10-PCS | Mod: 95,,, | Performed by: CLINICAL NEUROPSYCHOLOGIST

## 2023-06-29 PROCEDURE — 99499 UNLISTED E&M SERVICE: CPT | Mod: 95,,, | Performed by: CLINICAL NEUROPSYCHOLOGIST

## 2023-06-29 PROCEDURE — 90791 PSYCH DIAGNOSTIC EVALUATION: CPT | Mod: 95,,, | Performed by: CLINICAL NEUROPSYCHOLOGIST

## 2023-06-29 PROCEDURE — 99499 NO LOS: ICD-10-PCS | Mod: 95,,, | Performed by: CLINICAL NEUROPSYCHOLOGIST

## 2023-06-29 NOTE — LETTER
June 29, 2023        Justin Jc MD  [address] [facility] Neurology [address] [facility] [address]             [facility] [address]  [address]  [facility] [address]  Phone: [phone]  Fax: [phone]   Patient: Stephan Wallace   MR Number: [id number]   YOB: 1990   Date of Visit: 6/29/2023       Dear Dr. Jc:    Thank you for referring Stephan Wallace to me for evaluation. Below are the relevant portions of my assessment and plan of care.            If you have questions, please do not hesitate to call me. I look forward to following Stephan along with you.    Sincerely,      Katiana Cisneros, PhD           CC    No Recipients

## 2023-08-16 DIAGNOSIS — G43.109 MIGRAINE WITH AURA AND WITHOUT STATUS MIGRAINOSUS, NOT INTRACTABLE: ICD-10-CM

## 2023-08-16 RX ORDER — SUMATRIPTAN SUCCINATE 100 MG/1
TABLET ORAL
Qty: 12 TABLET | Refills: 5 | OUTPATIENT
Start: 2023-08-16

## 2023-11-13 ENCOUNTER — OFFICE VISIT (OUTPATIENT)
Dept: NEUROLOGY | Facility: CLINIC | Age: 33
End: 2023-11-13
Payer: COMMERCIAL

## 2023-11-13 DIAGNOSIS — G43.109 MIGRAINE WITH AURA AND WITHOUT STATUS MIGRAINOSUS, NOT INTRACTABLE: Primary | ICD-10-CM

## 2023-11-13 PROCEDURE — 99214 OFFICE O/P EST MOD 30 MIN: CPT | Mod: 95,,, | Performed by: PHYSICIAN ASSISTANT

## 2023-11-13 PROCEDURE — 99214 PR OFFICE/OUTPT VISIT, EST, LEVL IV, 30-39 MIN: ICD-10-PCS | Mod: 95,,, | Performed by: PHYSICIAN ASSISTANT

## 2023-11-13 RX ORDER — SUMATRIPTAN 20 MG/1
SPRAY NASAL
Qty: 12 EACH | Refills: 2 | Status: SHIPPED | OUTPATIENT
Start: 2023-11-13 | End: 2024-03-11

## 2023-11-13 RX ORDER — SUMATRIPTAN SUCCINATE 100 MG/1
TABLET ORAL
Qty: 12 TABLET | Refills: 5 | Status: SHIPPED | OUTPATIENT
Start: 2023-11-13 | End: 2024-11-13

## 2023-11-13 NOTE — PROGRESS NOTES
Established Patient     The patient location is: [place]  The chief complaint leading to consultation is: ha f/up    Visit type: audiovisual    Face to Face time with patient: 10 min  15 minutes of total time spent on the encounter, which includes face to face time and non-face to face time preparing to see the patient (eg, review of tests), Obtaining and/or reviewing separately obtained history, Documenting clinical information in the electronic or other health record, Independently interpreting results (not separately reported) and communicating results to the patient/family/caregiver, or Care coordination (not separately reported).         Each patient to whom he or she provides medical services by telemedicine is:  (1) informed of the relationship between the physician and patient and the respective role of any other health care provider with respect to management of the patient; and (2) notified that he or she may decline to receive medical services by telemedicine and may withdraw from such care at any time.    Notes:     SUBJECTIVE:  Patient ID: Stephan Wallace   Chief Complaint: Headache    History of Present Illness:  Stephan Wallace is a 33 y.o. female with migraines, depression, anxiety who presents to [facility] for follow-up of headaches.       11/13/2023 - Interval History:  Pt's ha's worsened in the last month, denies any changes at the time. Ha's occurring 17/30 days this month. Imitrex is helping, but is temporary.   Discussed otpions. Would like to try nurtec instead of emgality 2/2 painful when injecting.   Plan: try nurtec preventatively (if insurance denies, is amenable to injectable cgrp inhibitor), continue imitrex tabs prn, will try imitrex NS as 2nd line, rtc 2-3 mo or sooner if needed    06/20/2023 - Interval History:  Pt completed 3 rounds of emgality after last visit which helped greatly. She stopped taking it approximately 3 months ago. Ha's have been well controlled since last visit,  occurring 1-2 times a month. Resolvign w/ imitrex.   Continues to follow w/ neurology regarding abnormal movements.   Plan: continue imitrex prn, continue neuro recs, rtc prn    Recommendations made at last Office Visit on 1/25/23:  - Discussed symptoms appear to be consistent with migraines, discussed treatment options and patient agreed with the following plan:  - ppx - start emgality  - abortive - continue imitrex 100mg tab prn  - anxiety/depression - continue cymbalta 60mg/d, mgmt per psychiatrist  - risks, benefits, and potential side effects of emgality, imitrex discussed   - alternative treatment options offered   - importance of healthy diet, regular exercise and sleep hygiene in the treatment of headaches    - Start tracking headaches via Migraine Buddy ifrah on phone   - RTC in 4 mo or sooner if needed     Treatments Tried:  Emgality - completed 3 rounds, helped, no se's, painful  Tpx - face tingling  Cymbalta 60mg/d - helps depression/anxiety  Imitrex 100mg - helps  Excedrin  Advil     Current Medications:    Current Outpatient Medications:     baclofen (LIORESAL) 10 MG tablet, Take 1 tablet (10 mg total) by mouth 3 (three) times daily as needed (muscle spasm)., Disp: 30 tablet, Rfl: 0    diphenhydrAMINE (BENADRYL) 50 MG capsule, Take 1 capsule (50 mg total) by mouth every 6 (six) hours as needed (muscle spasm)., Disp: 20 capsule, Rfl: 0    drospirenone-ethinyl estradioL (FRANCESCA) 3-0.03 mg per tablet, TAKE 1 TABLET BY MOUTH EVERY DAY, Disp: 84 tablet, Rfl: 0    DULoxetine (CYMBALTA) 60 MG capsule, Take 120 mg by mouth., Disp: , Rfl:     rimegepant 75 mg odt, Take 1 tablet (75 mg total) by mouth every other day. Place ODT tablet on or under the tongue., Disp: 16 tablet, Rfl: 5    sumatriptan (IMITREX) 100 MG tablet, Take 1 tab at first sign of migraine may repeat 2 hours later if needed, Disp: 12 tablet, Rfl: 5    SUMAtriptan (IMITREX) 20 mg/actuation nasal spray, Spray nasally at onset of migraine, can  repeat in 2 hrs if needed.  No more than twice per day or 3 days/wk., Disp: 12 each, Rfl: 2    Review of Systems - as per HPI, otherwise a balanced 10 systems review is negative.    OBJECTIVE:  Vitals:  There were no vitals taken for this visit.     Physical Exam:  Constitutional: she appears well-developed and well-nourished. she is well groomed. NAD   HENT:    Head: Normocephalic and atraumatic  Eyes: Conjunctivae and EOM are normal  Musculoskeletal: Normal range of motion. No joint stiffness.   Skin: Skin is warm and dry.  Psychiatric: Mood and affect are normal    Neuro: Patient is alert and oriented to person, place, and time. Language is intact and fluent. Speech is clear and fluent. Recent and remote memory are intact.  Normal attention and concentration.  Facial movement is symmetric. Moves all 4 extremities against gravity. Gait and station normal.  Cranial Nerves II through XII without focal deficit.     Review of Data:   Notes from neuro reviewed   Labs:  No visits with results within 3 Month(s) from this visit.   Latest known visit with results is:   Admission on 06/07/2023, Discharged on 06/07/2023   Component Date Value Ref Range Status    POC Preg Test, Ur 06/07/2023 Negative  Negative Final     Acceptable 06/07/2023 Yes   Final     Imaging:  Results for orders placed or performed during the hospital encounter of 07/06/22   CT Head Without Contrast    Narrative    EXAMINATION:  CT HEAD WITHOUT CONTRAST    CLINICAL HISTORY:  Headache, sudden, severe;    TECHNIQUE:  Low dose axial images were obtained through the head.  Coronal and sagittal reformations were also performed. Contrast was not administered.    COMPARISON:  None.    FINDINGS:  The subcutaneous tissues are unremarkable.  The bony calvarium is intact.  The paranasal sinuses are unremarkable.  The mastoid air cells are clear.  The orbits and intraorbital contents are unremarkable.    The craniocervical junction is intact.  The  midline structures are unremarkable.  There are no extra-axial fluid collections.  There is no evidence of intracranial hemorrhage.  The ventricles and sulci are within normal limits.  The cisterns are unremarkable.  The gray-white differentiation is maintained.  There is no dense vessel sign.  There is no evidence of mass effect.      Impression    No acute process.  Follow-up, as clinically warranted.      Electronically signed by: Adam De MD  Date:    07/06/2022  Time:    18:28     Note: I have independently reviewed any/all imaging/labs/tests and agree with the report (s) as documented.  Any discrepancies will be as noted/demarcated by free text.  JOSUÉ RODRIGUEZ 11/13/2023    ASSESSMENT:  1. Migraine with aura and without status migrainosus, not intractable          PLAN:  - Discussed symptoms appear to be consistent with migraines, discussed treatment options and patient agreed with the following plan:  - ppx - try nurtec  - abortive - continue imitrex 100mg tab prn, try imitrex NS as 2nd line  - anxiety/depression - mgmt per psychiatrist  - abnormal movements - mgmt per neuro  - track ha's  - Discussed goals of therapy are to decrease the frequency, intensity, and duration of headaches  - RTC 2-3 mo     Orders Placed This Encounter    rimegepant 75 mg odt    SUMAtriptan (IMITREX) 20 mg/actuation nasal spray    sumatriptan (IMITREX) 100 MG tablet       Questions and concerns were sought and answered to the patient's stated verbal satisfaction.  The patient verbalizes understanding and agreement with the above stated treatment plan.     CC: No, Primary Doctor      Marley Gudino PA-C  [facility]   [phone]    Dr. Doyle was available during today's encounter.

## 2023-11-17 ENCOUNTER — PATIENT MESSAGE (OUTPATIENT)
Dept: NEUROLOGY | Facility: CLINIC | Age: 33
End: 2023-11-17
Payer: COMMERCIAL

## 2023-12-11 ENCOUNTER — OFFICE VISIT (OUTPATIENT)
Dept: NEUROLOGY | Facility: CLINIC | Age: 33
End: 2023-12-11
Payer: COMMERCIAL

## 2023-12-11 DIAGNOSIS — G43.109 MIGRAINE WITH AURA AND WITHOUT STATUS MIGRAINOSUS, NOT INTRACTABLE: Primary | ICD-10-CM

## 2023-12-11 PROCEDURE — 99214 OFFICE O/P EST MOD 30 MIN: CPT | Mod: 95,,, | Performed by: PHYSICIAN ASSISTANT

## 2023-12-11 PROCEDURE — 99214 PR OFFICE/OUTPT VISIT, EST, LEVL IV, 30-39 MIN: ICD-10-PCS | Mod: 95,,, | Performed by: PHYSICIAN ASSISTANT

## 2023-12-11 NOTE — PROGRESS NOTES
Established Patient     The patient location is: [place]  The chief complaint leading to consultation is: ha f/up    Visit type: audiovisual    Face to Face time with patient: 12 min  20 minutes of total time spent on the encounter, which includes face to face time and non-face to face time preparing to see the patient (eg, review of tests), Obtaining and/or reviewing separately obtained history, Documenting clinical information in the electronic or other health record, Independently interpreting results (not separately reported) and communicating results to the patient/family/caregiver, or Care coordination (not separately reported).         Each patient to whom he or she provides medical services by telemedicine is:  (1) informed of the relationship between the physician and patient and the respective role of any other health care provider with respect to management of the patient; and (2) notified that he or she may decline to receive medical services by telemedicine and may withdraw from such care at any time.    Notes:     SUBJECTIVE:  Patient ID: Stephan Wallace   Chief Complaint: Headache    History of Present Illness:  Stephan Wallace is a 33 y.o. female with migraines, depression, anxiety who presents to [facility] [place] for follow-up of headaches.       12/11/2023 - Interval History:  Pt has not been taking emgality for many months. Is unsure when she stopped taking it. Of note, they were very painful for her, she would like to avoid injectable options if possible. Ha's are occurring approximately 8/30 days per month. Pt is tracking, will send diary via Ev[employer].   Nurtec - hasn't tried yet  Imitrex NS - hasn't tried yet  Imitrex tabs - help better than anything else taken so far  Plan: try nurtec preventatively, continue imitrex tabs prn, track ha's, rtc 2-3 mo or sooner if needed    11/13/2023 - Interval History:  Pt's ha's worsened in the last month, denies any changes at the time. Ha's occurring 17/30 days  this month. Imitrex is helping, but is temporary.   Discussed otpions. Would like to try nurtec instead of emgality 2/2 painful when injecting.   Plan: try nurtec preventatively (if insurance denies, is amenable to injectable cgrp inhibitor), continue imitrex tabs prn, will try imitrex NS as 2nd line, rtc 2-3 mo or sooner if needed    06/20/2023 - Interval History:  Pt completed 3 rounds of emgality after last visit which helped greatly. She stopped taking it approximately 3 months ago. Ha's have been well controlled since last visit, occurring 1-2 times a month. Resolvign w/ imitrex.   Continues to follow w/ neurology regarding abnormal movements.   Plan: continue imitrex prn, continue neuro recs, rtc prn    Recommendations made at last Office Visit on 1/25/23:  - Discussed symptoms appear to be consistent with migraines, discussed treatment options and patient agreed with the following plan:  - ppx - start emgality  - abortive - continue imitrex 100mg tab prn  - anxiety/depression - continue cymbalta 60mg/d, mgmt per psychiatrist  - risks, benefits, and potential side effects of emgality, imitrex discussed   - alternative treatment options offered   - importance of healthy diet, regular exercise and sleep hygiene in the treatment of headaches    - Start tracking headaches via Migraine Buddy ifrah on phone   - RTC in 4 mo or sooner if needed     Treatments Tried:  Emgality - completed 3 rounds, helped, no se's, painful  Tpx - face tingling  Cymbalta 60mg/d - helps depression/anxiety  Imitrex 100mg - helps  Excedrin  Advil     Current Medications:    Current Outpatient Medications:     baclofen (LIORESAL) 10 MG tablet, Take 1 tablet (10 mg total) by mouth 3 (three) times daily as needed (muscle spasm)., Disp: 30 tablet, Rfl: 0    diphenhydrAMINE (BENADRYL) 50 MG capsule, Take 1 capsule (50 mg total) by mouth every 6 (six) hours as needed (muscle spasm)., Disp: 20 capsule, Rfl: 0    drospirenone-ethinyl estradioL  (FRANCESCA) 3-0.03 mg per tablet, TAKE 1 TABLET BY MOUTH EVERY DAY, Disp: 84 tablet, Rfl: 0    DULoxetine (CYMBALTA) 60 MG capsule, Take 120 mg by mouth., Disp: , Rfl:     rimegepant 75 mg odt, Take 1 tablet (75 mg total) by mouth every other day. Place ODT tablet on or under the tongue., Disp: 16 tablet, Rfl: 5    sumatriptan (IMITREX) 100 MG tablet, Take 1 tab at first sign of migraine may repeat 2 hours later if needed, Disp: 12 tablet, Rfl: 5    SUMAtriptan (IMITREX) 20 mg/actuation nasal spray, Spray nasally at onset of migraine, can repeat in 2 hrs if needed.  No more than twice per day or 3 days/wk., Disp: 12 each, Rfl: 2    Review of Systems - as per HPI, otherwise a balanced 10 systems review is negative.    OBJECTIVE:  Vitals:  There were no vitals taken for this visit.     Physical Exam:  Constitutional: she appears well-developed and well-nourished. she is well groomed. NAD   HENT:    Head: Normocephalic and atraumatic  Eyes: Conjunctivae and EOM are normal  Musculoskeletal: Normal range of motion. No joint stiffness.   Skin: Skin is warm and dry.  Psychiatric: Mood and affect are normal    Neuro: Patient is alert and oriented to person, place, and time. Language is intact and fluent. Speech is clear and fluent. Recent and remote memory are intact.  Normal attention and concentration.  Facial movement is symmetric. Moves all 4 extremities against gravity. Gait and station normal.  Cranial Nerves II through XII without focal deficit.     Review of Data:   Notes from neuro reviewed   Labs:  No visits with results within 3 Month(s) from this visit.   Latest known visit with results is:   Admission on 06/07/2023, Discharged on 06/07/2023   Component Date Value Ref Range Status    POC Preg Test, Ur 06/07/2023 Negative  Negative Final     Acceptable 06/07/2023 Yes   Final     Imaging:  Results for orders placed or performed during the hospital encounter of 07/06/22   CT Head Without Contrast     Narrative    EXAMINATION:  CT HEAD WITHOUT CONTRAST    CLINICAL HISTORY:  Headache, sudden, severe;    TECHNIQUE:  Low dose axial images were obtained through the head.  Coronal and sagittal reformations were also performed. Contrast was not administered.    COMPARISON:  None.    FINDINGS:  The subcutaneous tissues are unremarkable.  The bony calvarium is intact.  The paranasal sinuses are unremarkable.  The mastoid air cells are clear.  The orbits and intraorbital contents are unremarkable.    The craniocervical junction is intact.  The midline structures are unremarkable.  There are no extra-axial fluid collections.  There is no evidence of intracranial hemorrhage.  The ventricles and sulci are within normal limits.  The cisterns are unremarkable.  The gray-white differentiation is maintained.  There is no dense vessel sign.  There is no evidence of mass effect.      Impression    No acute process.  Follow-up, as clinically warranted.      Electronically signed by: Adam De MD  Date:    07/06/2022  Time:    18:28     Note: I have independently reviewed any/all imaging/labs/tests and agree with the report (s) as documented.  Any discrepancies will be as noted/demarcated by free text.  JOSUÉ RODRIGUEZ 12/11/2023    ASSESSMENT:  1. Migraine with aura and without status migrainosus, not intractable            PLAN:  - Discussed symptoms appear to be consistent with migraines, discussed treatment options and patient agreed with the following plan:  - ppx - try nurtec  - abortive - continue imitrex 100mg tab prn  - anxiety/depression - mgmt per psychiatrist  - abnormal movements - mgmt per neuro  - track ha's  - Discussed goals of therapy are to decrease the frequency, intensity, and duration of headaches  - RTC 2-3 mo            Questions and concerns were sought and answered to the patient's stated verbal satisfaction.  The patient verbalizes understanding and agreement with the above stated treatment plan.     CC: No,  Primary Doctor      Marley Gudino PA-C  [facility]   [phone]    Dr. Doyle was available during today's encounter.

## 2024-03-11 DIAGNOSIS — G43.109 MIGRAINE WITH AURA AND WITHOUT STATUS MIGRAINOSUS, NOT INTRACTABLE: ICD-10-CM

## 2024-03-11 RX ORDER — SUMATRIPTAN 20 MG/1
SPRAY NASAL
Qty: 12 EACH | Refills: 2 | Status: SHIPPED | OUTPATIENT
Start: 2024-03-11 | End: 2024-05-15

## 2024-03-15 ENCOUNTER — PATIENT MESSAGE (OUTPATIENT)
Dept: NEUROLOGY | Facility: CLINIC | Age: 34
End: 2024-03-15

## 2024-04-05 ENCOUNTER — OFFICE VISIT (OUTPATIENT)
Dept: NEUROLOGY | Facility: CLINIC | Age: 34
End: 2024-04-05
Payer: COMMERCIAL

## 2024-04-05 DIAGNOSIS — G43.109 MIGRAINE WITH AURA AND WITHOUT STATUS MIGRAINOSUS, NOT INTRACTABLE: Primary | ICD-10-CM

## 2024-04-05 PROCEDURE — 99214 OFFICE O/P EST MOD 30 MIN: CPT | Mod: 95,,, | Performed by: PHYSICIAN ASSISTANT

## 2024-04-05 RX ORDER — UBROGEPANT 100 MG/1
TABLET ORAL
Qty: 16 TABLET | Refills: 5 | Status: SHIPPED | OUTPATIENT
Start: 2024-04-05 | End: 2024-04-10

## 2024-04-05 NOTE — PROGRESS NOTES
Established Patient     The patient location is: [place]  The chief complaint leading to consultation is: ha f/up    Visit type: audiovisual    Face to Face time with patient: 20 min  23 minutes of total time spent on the encounter, which includes face to face time and non-face to face time preparing to see the patient (eg, review of tests), Obtaining and/or reviewing separately obtained history, Documenting clinical information in the electronic or other health record, Independently interpreting results (not separately reported) and communicating results to the patient/family/caregiver, or Care coordination (not separately reported).         Each patient to whom he or she provides medical services by telemedicine is:  (1) informed of the relationship between the physician and patient and the respective role of any other health care provider with respect to management of the patient; and (2) notified that he or she may decline to receive medical services by telemedicine and may withdraw from such care at any time.    Notes:     SUBJECTIVE:  Patient ID: Stephan Wallace   Chief Complaint: Headache    History of Present Illness:  Stephan Wallace is a 33 y.o. female with migraines, depression, anxiety who presents to [facility] for follow-up of headaches.       04/05/2024 - Interval History:  Pt not currently tracking. Is unsure of frequency. Did have 2 HA's last week lasting 2 days each. Had 6 HA's in the last month lasting days each. Discussed options w/ pt. Including preventatives and abortives and previous insurance responses (nurtec not approved until tried aimovig and ubrelvy first). Pt would like to try ubrelvy and track ha's.   Plan: try ubrelvy 50mg prn, continue imitrex tabs prn, track ha's, rtc 2-3 mo or sooner if needed    12/11/2023 - Interval History:  Pt has not been taking emgality for many months. Is unsure when she stopped taking it. Of note, they were very painful for her, she would like to avoid  injectable options if possible. Ha's are occurring approximately 8/30 days per month. Pt is tracking, will send diary via Netlog.   Nurtec - hasn't tried yet  Imitrex NS - hasn't tried yet  Imitrex tabs - help better than anything else taken so far  Plan: try nurtec preventatively, continue imitrex tabs prn, track ha's, rtc 2-3 mo or sooner if needed    11/13/2023 - Interval History:  Pt's ha's worsened in the last month, denies any changes at the time. Ha's occurring 17/30 days this month. Imitrex is helping, but is temporary.   Discussed otpions. Would like to try nurtec instead of emgality 2/2 painful when injecting.   Plan: try nurtec preventatively (if insurance denies, is amenable to injectable cgrp inhibitor), continue imitrex tabs prn, will try imitrex NS as 2nd line, rtc 2-3 mo or sooner if needed    06/20/2023 - Interval History:  Pt completed 3 rounds of emgality after last visit which helped greatly. She stopped taking it approximately 3 months ago. Ha's have been well controlled since last visit, occurring 1-2 times a month. Resolvign w/ imitrex.   Continues to follow w/ neurology regarding abnormal movements.   Plan: continue imitrex prn, continue neuro recs, rtc prn    Recommendations made at last Office Visit on 1/25/23:  - Discussed symptoms appear to be consistent with migraines, discussed treatment options and patient agreed with the following plan:  - ppx - start emgality  - abortive - continue imitrex 100mg tab prn  - anxiety/depression - continue cymbalta 60mg/d, mgmt per psychiatrist  - risks, benefits, and potential side effects of emgality, imitrex discussed   - alternative treatment options offered   - importance of healthy diet, regular exercise and sleep hygiene in the treatment of headaches    - Start tracking headaches via Migraine Alexis ifrah on phone   - RTC in 4 mo or sooner if needed     Treatments Tried:  Emgality - completed 3 rounds, helped,  painful  Tpx - face tingling  Cymbalta  60mg/d - helps depression/anxiety  Imitrex 100mg - helps  Excedrin  Advil     Current Medications:    Current Outpatient Medications:     baclofen (LIORESAL) 10 MG tablet, Take 1 tablet (10 mg total) by mouth 3 (three) times daily as needed (muscle spasm)., Disp: 30 tablet, Rfl: 0    butalbital-acetaminophen-caffeine -40 mg (FIORICET, ESGIC) -40 mg per tablet, Take 1 tablet by mouth every 4 (four) hours as needed., Disp: 20 tablet, Rfl: 0    diphenhydrAMINE (BENADRYL) 50 MG capsule, Take 1 capsule (50 mg total) by mouth every 6 (six) hours as needed (muscle spasm)., Disp: 20 capsule, Rfl: 0    drospirenone-ethinyl estradioL (FRANCESCA) 3-0.03 mg per tablet, TAKE 1 TABLET BY MOUTH EVERY DAY, Disp: 84 tablet, Rfl: 0    DULoxetine (CYMBALTA) 60 MG capsule, Take 120 mg by mouth., Disp: , Rfl:     ibuprofen (ADVIL,MOTRIN) 600 MG tablet, Take 1 tablet (600 mg total) by mouth every 6 (six) hours as needed for Pain., Disp: 20 tablet, Rfl: 0    sulfamethoxazole-trimethoprim 800-160mg (BACTRIM DS) 800-160 mg Tab, Take 1 tablet by mouth 2 (two) times daily., Disp: 10 tablet, Rfl: 0    sumatriptan (IMITREX) 100 MG tablet, Take 1 tab at first sign of migraine may repeat 2 hours later if needed, Disp: 12 tablet, Rfl: 5    SUMAtriptan (IMITREX) 20 mg/actuation nasal spray, SPRAY NASALLY AT ONSET OF MIGRAINE, CAN REPEAT IN 2 HRS IF NEEDED. NO MORE THAN TWICE PER DAY OR 3 DAYS/WK., Disp: 12 each, Rfl: 2    ubrogepant (UBRELVY) 100 mg tablet, Take 1 tablet by mouth at the onset of a headache. May repeat based on response and tolerability after more than 2 hours if needed. Do not take more than 200mg in a 24 hour span., Disp: 16 tablet, Rfl: 5    Review of Systems - as per HPI, otherwise a balanced 10 systems review is negative.    OBJECTIVE:  Vitals:  There were no vitals taken for this visit.     Physical Exam:  Constitutional: she appears well-developed and well-nourished. she is well groomed. NAD   HENT:    Head:  Normocephalic and atraumatic  Eyes: Conjunctivae and EOM are normal  Musculoskeletal: Normal range of motion. No joint stiffness.   Skin: Skin is warm and dry.  Psychiatric: Mood and affect are normal    Neuro: Patient is alert and oriented to person, place, and time. Language is intact and fluent. Speech is clear and fluent. Recent and remote memory are intact.  Normal attention and concentration.  Facial movement is symmetric. Moves all 4 extremities against gravity. Gait and station normal.  Cranial Nerves II through XII without focal deficit.     Review of Data:   Notes from neuro reviewed   Labs:  Admission on 02/21/2024, Discharged on 02/21/2024   Component Date Value Ref Range Status    Specimen UA 02/21/2024 Urine, Clean Catch   Final    Color, UA 02/21/2024 Yellow  Yellow, Straw, Nikky Final    Appearance, UA 02/21/2024 Clear  Clear Final    pH, UA 02/21/2024 6.0  5.0 - 8.0 Final    Specific Gravity, UA 02/21/2024 >1.030 (A)  1.005 - 1.030 Final    Protein, UA 02/21/2024 Trace (A)  Negative Final    Glucose, UA 02/21/2024 Negative  Negative Final    Ketones, UA 02/21/2024 Trace (A)  Negative Final    Bilirubin (UA) 02/21/2024 Negative  Negative Final    Occult Blood UA 02/21/2024 Negative  Negative Final    Nitrite, UA 02/21/2024 Negative  Negative Final    Urobilinogen, UA 02/21/2024 Negative  Negative EU/dL Final    Leukocytes, UA 02/21/2024 3+ (A)  Negative Final    POC Preg Test, Ur 02/21/2024 Negative  Negative Final     Acceptable 02/21/2024 Yes   Final    RBC, UA 02/21/2024 4  0 - 4 /hpf Final    WBC, UA 02/21/2024 2  0 - 5 /hpf Final    Bacteria 02/21/2024 Rare  None-Occ /hpf Final    Squam Epithel, UA 02/21/2024 4  /hpf Final    Ca Oxalate Ailyn, UA 02/21/2024 Moderate  None-Moderate Final    Microscopic Comment 02/21/2024 SEE COMMENT   Final     Imaging:  Results for orders placed or performed during the hospital encounter of 07/06/22   CT Head Without Contrast    Narrative     EXAMINATION:  CT HEAD WITHOUT CONTRAST    CLINICAL HISTORY:  Headache, sudden, severe;    TECHNIQUE:  Low dose axial images were obtained through the head.  Coronal and sagittal reformations were also performed. Contrast was not administered.    COMPARISON:  None.    FINDINGS:  The subcutaneous tissues are unremarkable.  The bony calvarium is intact.  The paranasal sinuses are unremarkable.  The mastoid air cells are clear.  The orbits and intraorbital contents are unremarkable.    The craniocervical junction is intact.  The midline structures are unremarkable.  There are no extra-axial fluid collections.  There is no evidence of intracranial hemorrhage.  The ventricles and sulci are within normal limits.  The cisterns are unremarkable.  The gray-white differentiation is maintained.  There is no dense vessel sign.  There is no evidence of mass effect.      Impression    No acute process.  Follow-up, as clinically warranted.      Electronically signed by: Adam De MD  Date:    07/06/2022  Time:    18:28     Note: I have independently reviewed any/all imaging/labs/tests and agree with the report (s) as documented.  Any discrepancies will be as noted/demarcated by free text.  JOSUÉ RODRIGUEZ 4/5/2024    ASSESSMENT:  1. Migraine with aura and without status migrainosus, not intractable          PLAN:  - Discussed symptoms appear to be consistent with migraines, discussed treatment options and patient agreed with the following plan:  - abortive - continue imitrex 100mg tab prn, try ubrelvy 100mg prn  - anxiety/depression - mgmt per psychiatrist  - abnormal movements - mgmt per neuro  - track ha's  - Discussed goals of therapy are to decrease the frequency, intensity, and duration of headaches  - RTC 2-3 mo     Orders Placed This Encounter    ubrogepant (UBRELVY) 100 mg tablet         Questions and concerns were sought and answered to the patient's stated verbal satisfaction.  The patient verbalizes understanding and  agreement with the above stated treatment plan.     CC: No, Primary Doctor      Marley Gudino PA-C  [facility]   [phone]    Dr. Doyle was available during today's encounter.

## 2024-05-03 ENCOUNTER — OFFICE VISIT (OUTPATIENT)
Dept: URGENT CARE | Facility: CLINIC | Age: 34
End: 2024-05-03
Payer: COMMERCIAL

## 2024-05-03 VITALS
BODY MASS INDEX: 47.09 KG/M2 | OXYGEN SATURATION: 96 % | DIASTOLIC BLOOD PRESSURE: 81 MMHG | SYSTOLIC BLOOD PRESSURE: 116 MMHG | TEMPERATURE: 98 F | HEIGHT: 66 IN | RESPIRATION RATE: 20 BRPM | HEART RATE: 82 BPM | WEIGHT: 293 LBS

## 2024-05-03 DIAGNOSIS — M25.562 ACUTE PAIN OF LEFT KNEE: Primary | ICD-10-CM

## 2024-05-03 PROCEDURE — 96372 THER/PROPH/DIAG INJ SC/IM: CPT | Mod: S$GLB,,, | Performed by: EMERGENCY MEDICINE

## 2024-05-03 PROCEDURE — 99204 OFFICE O/P NEW MOD 45 MIN: CPT | Mod: 25,S$GLB,, | Performed by: NURSE PRACTITIONER

## 2024-05-03 RX ORDER — KETOROLAC TROMETHAMINE 30 MG/ML
30 INJECTION, SOLUTION INTRAMUSCULAR; INTRAVENOUS
Status: COMPLETED | OUTPATIENT
Start: 2024-05-03 | End: 2024-05-03

## 2024-05-03 RX ORDER — NAPROXEN 500 MG/1
500 TABLET ORAL 2 TIMES DAILY PRN
Qty: 14 TABLET | Refills: 0 | Status: SHIPPED | OUTPATIENT
Start: 2024-05-03 | End: 2024-05-10

## 2024-05-03 RX ADMIN — KETOROLAC TROMETHAMINE 30 MG: 30 INJECTION, SOLUTION INTRAMUSCULAR; INTRAVENOUS at 05:05

## 2024-05-03 NOTE — PATIENT INSTRUCTIONS
Increase clear fluid intake  May apply ice to affected area for 15 minutes every 2 hours.  After 48 hours may progress to moist heat or heating pad.  Take care not to fall sleep on heating pad as this may cause severe burns  Elevate area at rest  May wear Ace wrap at all times for comfort and stability.  You may remove it for hygiene purposes and then reapply  Take naproxen as directed.  Take each dose with a full meal.  Do not take any additional NSAIDs while taking naproxen.  You may take additional Tylenol as needed between doses    Follow-up with primary care provider and orthopedic referral   Return to clinic for new or worse symptoms

## 2024-05-03 NOTE — PROGRESS NOTES
"Subjective:      Patient ID: Stephan Wallace is a 33 y.o. female.    Vitals:  height is 5' 6" (1.676 m) and weight is 137 kg (302 lb) (abnormal). Her temperature is 98.2 °F (36.8 °C). Her blood pressure is 116/81 and her pulse is 82. Her respiration is 20 and oxygen saturation is 96%.     Chief Complaint: Knee Pain    33-year-old female seen today for persistent left knee pain.  She was seen by urgent care 3 weeks ago and given prednisone and diagnosed with "cartilage inflammation".  She states she took all of the prednisone and states the knee pain continues and is possibly worse.  She denies injury or previous sports related injury in her youth.    Knee Pain   Incident onset: x's 3 Wks ago. There was no injury mechanism. The pain is present in the left knee and left leg. The quality of the pain is described as aching. The pain is mild. The pain has been Worsening since onset. She reports no foreign bodies present. The symptoms are aggravated by palpation. Treatments tried: Pt states "Have completed perscribed Meds and still having symptoms" The treatment provided mild relief.       Constitution: Negative for chills and fever.   HENT:  Negative for congestion and sore throat.    Cardiovascular:  Negative for chest pain, palpitations and sob on exertion.   Respiratory:  Negative for shortness of breath.    Gastrointestinal:  Negative for nausea and vomiting.   Musculoskeletal:  Positive for joint pain. Negative for trauma, joint swelling and abnormal ROM of joint.   Skin:  Negative for rash, erythema and bruising.   Neurological:  Negative for dizziness, light-headedness, passing out, disorientation and altered mental status.   Psychiatric/Behavioral:  Negative for altered mental status, disorientation and confusion.       Objective:     Physical Exam   Constitutional: She is oriented to person, place, and time. She appears well-developed. She is cooperative.  Non-toxic appearance. She does not appear ill. No " distress.   HENT:   Head: Normocephalic and atraumatic.   Ears:   Right Ear: External ear normal.   Left Ear: External ear normal.   Nose: Nose normal.   Mouth/Throat: Oropharynx is clear and moist and mucous membranes are normal. Mucous membranes are moist.   Eyes: Conjunctivae and lids are normal. No scleral icterus.   Neck: Trachea normal and phonation normal. Neck supple.   Cardiovascular: Normal rate, regular rhythm, normal heart sounds and normal pulses.   Pulmonary/Chest: Effort normal and breath sounds normal. No stridor. No respiratory distress.   Abdominal: Normal appearance and bowel sounds are normal.   Musculoskeletal:         General: No deformity.      Left knee: She exhibits normal range of motion, no swelling, no effusion, no ecchymosis, no deformity, no laceration, no erythema, normal alignment, no LCL laxity, normal patellar mobility, no bony tenderness, normal meniscus and no MCL laxity. Tenderness (Diffuse) found.     Instability Tests: anterior drawer test positive, posterior drawer test positive, anterior Lachman test positive, left knee positive medial Mery test and left knee positive lateral Mery test  Neurological: no focal deficit. She is alert and oriented to person, place, and time. She has normal strength and normal reflexes. No sensory deficit.   Skin: Skin is warm, dry, intact and not diaphoretic. Capillary refill takes 2 to 3 seconds. not left kneeNo erythema   Psychiatric: Her speech is normal and behavior is normal. Judgment and thought content normal.   Nursing note and vitals reviewed.      Assessment:     1. Acute pain of left knee        Plan:       Acute pain of left knee  -     ketorolac injection 30 mg  -     naproxen (NAPROSYN) 500 MG tablet; Take 1 tablet (500 mg total) by mouth 2 (two) times daily as needed (pain).  Dispense: 14 tablet; Refill: 0  -     XR KNEE 3 VIEW LEFT; Future; Expected date: 05/03/2024  -     Ambulatory referral/consult to Orthopedics  -      HME - OTHER      The test results and physical exam findings were discussed with the patient and all questions answered.  The x-rays were reviewed by me and there are no obvious deformities or fractures.  I advised the patient that she would be notified upon our receiving of the official over-read.  We discussed symptom monitoring, conservative care methods, medication use, and follow up orders. she verbalized understanding and agreement with the plan of care.

## 2024-05-09 ENCOUNTER — PATIENT MESSAGE (OUTPATIENT)
Dept: SPORTS MEDICINE | Facility: CLINIC | Age: 34
End: 2024-05-09
Payer: COMMERCIAL

## 2024-05-09 DIAGNOSIS — M25.562 LEFT KNEE PAIN, UNSPECIFIED CHRONICITY: Primary | ICD-10-CM

## 2024-05-15 ENCOUNTER — OFFICE VISIT (OUTPATIENT)
Dept: ORTHOPEDICS | Facility: CLINIC | Age: 34
End: 2024-05-15
Payer: COMMERCIAL

## 2024-05-15 VITALS — BODY MASS INDEX: 48.86 KG/M2 | WEIGHT: 293 LBS

## 2024-05-15 DIAGNOSIS — M25.562 ACUTE PAIN OF LEFT KNEE: Primary | ICD-10-CM

## 2024-05-15 PROCEDURE — 99999 PR PBB SHADOW E&M-EST. PATIENT-LVL III: CPT | Mod: PBBFAC,,, | Performed by: STUDENT IN AN ORGANIZED HEALTH CARE EDUCATION/TRAINING PROGRAM

## 2024-05-15 PROCEDURE — 99204 OFFICE O/P NEW MOD 45 MIN: CPT | Mod: S$GLB,,, | Performed by: STUDENT IN AN ORGANIZED HEALTH CARE EDUCATION/TRAINING PROGRAM

## 2024-05-15 RX ORDER — NAPROXEN 500 MG/1
500 TABLET ORAL 2 TIMES DAILY
COMMUNITY
End: 2024-05-31

## 2024-05-15 RX ORDER — ZOLPIDEM TARTRATE 10 MG/1
10 TABLET ORAL NIGHTLY PRN
COMMUNITY

## 2024-05-15 RX ORDER — DEXTROMETHORPHAN HYDROBROMIDE, BUPROPION HYDROCHLORIDE 105; 45 MG/1; MG/1
45-105 TABLET, MULTILAYER, EXTENDED RELEASE ORAL 2 TIMES DAILY
COMMUNITY

## 2024-05-15 RX ORDER — DIAZEPAM 5 MG/1
5 TABLET ORAL EVERY 6 HOURS PRN
COMMUNITY
Start: 2024-04-04

## 2024-05-15 NOTE — PROGRESS NOTES
CC: left knee pain    33 y.o. Female presents today for evaluation of her left knee pain. Pt reports gradual onset of knee pain about 1 month ago. Pt reports she went to Urgent Care twice due to severe pain; pt reports no relief from treatment from Urgent Care. Pt localizes pain to anterior knee. Pt reports pain is 5/10 today. Pt denies mechanical symptoms. Pt denies numbness/tingling.     SYMPTOMS:   Pain Score: 5/10  Pain location: anterior  Time of onset: 1 month  Trauma, injury: gradual onset    Audible pop: no  Clicking: no  Catching: no  Locking: no  Giving out, instability: feels like it could  Swelling: no  Theater sign: yes   Problems with stairs: yes    INTERVENTIONS:   Medications tried: prednisone (felt worse), naproxen (no relief), tylenol (mild relief)  Braces/devices: ace bandage (mild relief)  Physical therapy: none  Injections: IM injection @ Urgent Care (mild relief)    RELEVANT HISTORY:   Imaging to date: 5/3/24, 5/15/24  Previous significant knee injuries: none  Previous knee surgeries: none    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  Eyes: Patient denies eye pain or vision changes.  HEENT: Patient denies ear pain, sore throat, or nasal discharge.  CVS: Patient denies chest pain.  Lungs: Patient denies shortness of breath or cough.  Abdomen: Patient denies any stomach pain, nausea, vomiting, or diarrhea  Skin: Patient denies skin rash or itching.    Musculoskeletal: Patient denies recent injuries or trauma.  Neuro: Patient denies any numbness or tingling in upper or lower extremities.  Psych: Patient reports anxiety.     PAST MEDICAL HISTORY:   Past Medical History:   Diagnosis Date    Anxiety     Depression        PAST SURGICAL HISTORY:  No past surgical history on file.    FAMILY HISTORY:  No family history on file.    SOCIAL HISTORY:  Social History     Socioeconomic History    Marital status:  Single   Tobacco Use    Smoking status: Never    Smokeless tobacco: Never   Substance and Sexual Activity    Drug use: Never    Sexual activity: Yes     Partners: Male     Birth control/protection: OCP     Social Determinants of Health     Financial Resource Strain: Medium Risk (12/11/2023)    Overall Financial Resource Strain (CARDIA)     Difficulty of Paying Living Expenses: Somewhat hard   Food Insecurity: No Food Insecurity (12/11/2023)    Hunger Vital Sign     Worried About Running Out of Food in the Last Year: Never true     Ran Out of Food in the Last Year: Never true   Transportation Needs: No Transportation Needs (12/11/2023)    PRAPARE - Transportation     Lack of Transportation (Medical): No     Lack of Transportation (Non-Medical): No   Physical Activity: Inactive (12/11/2023)    Exercise Vital Sign     Days of Exercise per Week: 0 days     Minutes of Exercise per Session: 0 min   Stress: Stress Concern Present (12/11/2023)    [employer] [place] [employer] - Occupational Stress Questionnaire     Feeling of Stress : Very much   Housing Stability: Low Risk  (12/11/2023)    Housing Stability Vital Sign     Unable to Pay for Housing in the Last Year: No     Number of Places Lived in the Last Year: 1     Unstable Housing in the Last Year: No       MEDICATIONS:     Current Outpatient Medications:     sumatriptan (IMITREX) 100 MG tablet, Take 1 tab at first sign of migraine may repeat 2 hours later if needed, Disp: 12 tablet, Rfl: 5    atogepant 30 mg Tab, Take 30 mg by mouth once daily. (Patient not taking: Reported on 5/15/2024), Disp: 30 tablet, Rfl: 5    baclofen (LIORESAL) 10 MG tablet, Take 1 tablet (10 mg total) by mouth 3 (three) times daily as needed (muscle spasm)., Disp: 30 tablet, Rfl: 0    butalbital-acetaminophen-caffeine -40 mg (FIORICET, ESGIC) -40 mg per tablet, Take 1 tablet by mouth every 4 (four) hours as needed., Disp: 20 tablet, Rfl: 0    diphenhydrAMINE (BENADRYL)  50 MG capsule, Take 1 capsule (50 mg total) by mouth every 6 (six) hours as needed (muscle spasm)., Disp: 20 capsule, Rfl: 0    drospirenone-ethinyl estradioL (FRANCESCA) 3-0.03 mg per tablet, TAKE 1 TABLET BY MOUTH EVERY DAY, Disp: 84 tablet, Rfl: 0    DULoxetine (CYMBALTA) 60 MG capsule, Take 120 mg by mouth., Disp: , Rfl:     sulfamethoxazole-trimethoprim 800-160mg (BACTRIM DS) 800-160 mg Tab, Take 1 tablet by mouth 2 (two) times daily., Disp: 10 tablet, Rfl: 0    SUMAtriptan (IMITREX) 20 mg/actuation nasal spray, SPRAY NASALLY AT ONSET OF MIGRAINE, CAN REPEAT IN 2 HRS IF NEEDED. NO MORE THAN TWICE PER DAY OR 3 DAYS/WK. (Patient not taking: Reported on 5/3/2024), Disp: 12 each, Rfl: 2    ALLERGIES:   Review of patient's allergies indicates:  No Known Allergies     XR KNEE 3 VIEW LEFT 05/03/2024    Narrative  EXAMINATION:  XR KNEE 3 VIEW LEFT    CLINICAL HISTORY:  Pain in left knee    TECHNIQUE:  AP, lateral, and Merchant views of the left knee were performed.    COMPARISON:  None    FINDINGS:  No fracture, dislocation, or osseous destructive process appreciated radiographically.  No chondrocalcinosis.  No left knee joint effusion.  No soft tissue abnormalities appreciated radiographically.    Impression  As above      Electronically signed by: Tommy Garza MD  Date:    05/03/2024  Time:    21:02    PHYSICAL EXAMINATION:  Wt (!) 137.3 kg (302 lb 11.1 oz)   LMP 03/17/2024   BMI 48.86 kg/m²   Vitals signs and nursing note have been reviewed.    General: In no acute distress, well developed, well nourished, no diaphoresis  Eyes: EOM full and smooth, no eye redness or discharge  HEENT: normocephalic and atraumatic, neck supple, trachea midline, no nasal discharge  Cardiovascular: no LE edema  Lungs: respirations non-labored, no conversational dyspnea   Neuro: AAOx3, CN2-12 grossly intact  Skin: No rashes, warm and dry  Psychiatric: cooperative, pleasant, mood and affect appropriate for age    Left Knee:   Gait:  wnl    Inspection/Palpation:   -Rubor   -Calor  -Effusion   -Patella ballotable   -Patellar apprehension  -Retinacular tenderness   -Patellar crepitus   Patellar tilt grossly normal     TTP at:  -Joint line   -MCL   -LCL   -Popliteal region   -Quad tendon   -Patella  -Pat tendon  -Pat border  -Med condyle   -Lat condyle   -Pes   -Prox fibula   -Tib tub  -Gerdy's tubercle  -Distal Hamstring tendons  -Proximal Hamstrings/Ischial tuberosity  -ITB    ROM:   Ext: 0°   Flex:130°   Popliteal Angle: 15°   -Discomfort w/ full flex   +Bounce-home discomfort     Ligamentous:   -Ant drawer   -Post drawer   -Lachman's   Good endpoints & no pain w/ valgus & varus stress    Meniscal:  -Mery's   -Mahendra   -Thessaly   -Pain w/ squat     Other:  -Patellar apprehension  -Patellar grind  -Watt's   -J sign  -Kenney's  Abductors     IMAGIN. Weight-bearing Knee X-ray ordered due to left knee pain. 2 views taken today.   2. X-ray images were reviewed personally by me and then directly with patient.  3. FINDINGS:   Normal bony alignment, no signs of acute fracture or dislocation.    4. IMPRESSION:  No acute osseous abnormalities appreciated.    ASSESSMENT:      ICD-10-CM ICD-9-CM   1. Acute pain of left knee  M25.562 719.46   2. BMI 45.0-49.9, adult  Z68.42 V85.42         PLAN:   Based on patient history, physical exam findings, and imaging I believe it is my due diligence to rule out spontaneous osteonecrosis of the knee.  Given this information, we will move for with MRI for further evaluation.  Follow-up after MRI.    All questions were answered to the best of my ability and all concerns were addressed at this time.    Follow up for above, or sooner if needed.    This note is dictated using the M*Modal Fluency Direct word recognition program. There are word recognition mistakes that are occasionally missed on review.

## 2024-05-21 ENCOUNTER — PATIENT MESSAGE (OUTPATIENT)
Dept: ORTHOPEDICS | Facility: CLINIC | Age: 34
End: 2024-05-21
Payer: COMMERCIAL

## 2024-05-21 NOTE — PROGRESS NOTES
Telemedicine/Virtual Visit Documentation:     The patient location is: home    The chief complaint leading to consultation is: see HPI    VISIT TYPE X   Virtual visit with synchronous audio and video X   Telephone E/M service      Total time spent with patient: see X suzi on chart below.   More than half of the time was spent counseling or coordinating care including prognosis, differential diagnosis, risks and benefits of treatment, instructions, compliance risk reductions     EST MINUTES X   10292 5    [id number] 10    30572 15    [id number] 25 X   [id number] 40    NEW     59040 10    16389 20    72294 30    76820 45    [id number] 60    PHONE      5-10    55333 11-20    40665 21-30      H&P  Orthopaedics      SUBJECTIVE:     History of Present Illness:  Patient is a 33 y.o. female with left knee pain following up to review MRI results.  Patient is still in significant discomfort.    Review of patient's allergies indicates:  No Known Allergies    Past Medical History:   Diagnosis Date    Anxiety     Depression      No past surgical history on file.  No family history on file.  Social History     Tobacco Use    Smoking status: Never    Smokeless tobacco: Never   Substance Use Topics    Drug use: Never        Review of Systems:  Patient denies constitutional symptoms, cardiac symptoms, respiratory symptoms, GI symptoms.  The remainder of the musculoskeletal ROS is included in the HPI.    MRI Knee Without Contrast Left  Narrative: EXAMINATION:  MRI KNEE WITHOUT CONTRAST LEFT    CLINICAL HISTORY:  Knee pain, chronic, degenerative disease on xray (Age >= 5y);concern for osteonecrosis;Pain in left knee    TECHNIQUE:  Multiplanar, multisequence MRI of the left knee performed per routine protocol without contrast.    COMPARISON:  05/15/2024    FINDINGS:  Menisci:  There is blunting of the free edge of the medial meniscus body segment.  Lateral meniscus is intact.    Ligaments:  ACL, PCL, MCL, and LCL complex are intact.    Tendons:   Extensor mechanism is maintained.    Cartilage:    Patellofemoral: Partial-thickness fissuring noted over the lateral patellar facet.  Full-thickness fissuring with mild subchondral edema noted over the medial trochlea.    Medial tibiofemoral: Full-thickness chondral fissuring seen over the central weight-bearing femoral condyle.    Lateral tibiofemoral: Articular cartilage is maintained.    Bone: No fracture or marrow replacing process.    Miscellaneous: Small joint effusion with tiny Baker's cyst.  Impression: 1. Patellofemoral and medial tibiofemoral chondral fissuring.  2. Blunting of the medial meniscus body segment free edge.    Electronically signed by: Emil Alcaraz MD  Date:    05/18/2024  Time:    09:40      OBJECTIVE:     Physical Exam:  Physical exam limited as this was a virtual visit, but it is apparent that the individual is in no acute distress, well groomed, well kept.  Speech is normal.  Patient is alert and oriented x3.  Mood and affect appear normal.       ASSESSMENT/PLAN:     A/P: Stephan Wallace is a 33 y.o. with patellofemoral and medial tibiofemoral chondral fissuring which is likely contributing to her knee pain.  Given this finding of the age of the patient, we will move for with hyaluronic acid injection for pain relief.  Would like to avoid steroid if possible given the age of the patient.    Plan:  - left knee Monovisc injection

## 2024-05-22 ENCOUNTER — OFFICE VISIT (OUTPATIENT)
Dept: ORTHOPEDICS | Facility: CLINIC | Age: 34
End: 2024-05-22
Payer: COMMERCIAL

## 2024-05-22 ENCOUNTER — PATIENT MESSAGE (OUTPATIENT)
Dept: ORTHOPEDICS | Facility: CLINIC | Age: 34
End: 2024-05-22

## 2024-05-22 DIAGNOSIS — M25.562 ACUTE PAIN OF LEFT KNEE: Primary | ICD-10-CM

## 2024-05-22 DIAGNOSIS — M17.11 PRIMARY OSTEOARTHRITIS OF RIGHT KNEE: ICD-10-CM

## 2024-05-22 PROCEDURE — 99214 OFFICE O/P EST MOD 30 MIN: CPT | Mod: 95,,, | Performed by: STUDENT IN AN ORGANIZED HEALTH CARE EDUCATION/TRAINING PROGRAM

## 2024-05-24 ENCOUNTER — OFFICE VISIT (OUTPATIENT)
Dept: SPORTS MEDICINE | Facility: CLINIC | Age: 34
End: 2024-05-24
Payer: COMMERCIAL

## 2024-05-24 VITALS
HEIGHT: 66 IN | DIASTOLIC BLOOD PRESSURE: 94 MMHG | HEART RATE: 92 BPM | BODY MASS INDEX: 48.86 KG/M2 | SYSTOLIC BLOOD PRESSURE: 137 MMHG

## 2024-05-24 DIAGNOSIS — M76.52 PATELLAR TENDONITIS OF LEFT KNEE: Primary | ICD-10-CM

## 2024-05-24 PROCEDURE — 99204 OFFICE O/P NEW MOD 45 MIN: CPT | Mod: S$GLB,,, | Performed by: ORTHOPAEDIC SURGERY

## 2024-05-24 PROCEDURE — 99999 PR PBB SHADOW E&M-EST. PATIENT-LVL III: CPT | Mod: PBBFAC,,, | Performed by: ORTHOPAEDIC SURGERY

## 2024-05-24 RX ORDER — MELOXICAM 7.5 MG/1
7.5 TABLET ORAL DAILY
Qty: 60 TABLET | Refills: 2 | Status: SHIPPED | OUTPATIENT
Start: 2024-05-24

## 2024-05-24 NOTE — PROGRESS NOTES
CC: Left knee pain,  for [employer], referred by friends at work, specifically Raza Hicks    Here for 2nd opinion, previously seen by Dr. Xuan Maddox    33 y.o. Female with a history of Left knee pain who She states that the pain is severe and not responding to any conservative care.  She has had pain for the last 5-6 weeks. She denies any specific injury or trauma. She thinks maybe it started when she was walking up the stairs but doesn't recall specifically any mechanism. She denies any swelling. She reports her pain is located anteriorly in the knee. She has not tried any PT, CSI. The pain has been so bad on two different occasions that she sought treatment at urgent care.     Denies mechanical symptoms, no instability    Is affecting ADLs.      SANE: 50  VAS 3/10    Review of Systems   Constitution: Negative. Negative for chills, fever and night sweats.   HENT: Negative for congestion and headaches.    Eyes: Negative for blurred vision, left vision loss and right vision loss.   Cardiovascular: Negative for chest pain and syncope.   Respiratory: Negative for cough and shortness of breath.    Endocrine: Negative for polydipsia, polyphagia and polyuria.   Hematologic/Lymphatic: Negative for bleeding problem. Does not bruise/bleed easily.   Skin: Negative for dry skin, itching and rash.   Musculoskeletal: Negative for falls. Positive for knee pain and muscle weakness.   Gastrointestinal: Negative for abdominal pain and bowel incontinence.   Genitourinary: Negative for bladder incontinence and nocturia.   Neurological: Negative for disturbances in coordination, loss of balance and seizures.   Psychiatric/Behavioral: Negative for depression. The patient does not have insomnia.    Allergic/Immunologic: Negative for hives and persistent infections.     PAST MEDICAL HISTORY:   Past Medical History:   Diagnosis Date    Anxiety     Depression      PAST SURGICAL HISTORY: History reviewed. No pertinent  surgical history.  FAMILY HISTORY: No family history on file.  SOCIAL HISTORY:   Social History     Socioeconomic History    Marital status: Single   Tobacco Use    Smoking status: Never    Smokeless tobacco: Never   Substance and Sexual Activity    Drug use: Never    Sexual activity: Yes     Partners: Male     Birth control/protection: OCP     Social Determinants of Health     Financial Resource Strain: Medium Risk (12/11/2023)    Overall Financial Resource Strain (CARDIA)     Difficulty of Paying Living Expenses: Somewhat hard   Food Insecurity: No Food Insecurity (12/11/2023)    Hunger Vital Sign     Worried About Running Out of Food in the Last Year: Never true     Ran Out of Food in the Last Year: Never true   Transportation Needs: No Transportation Needs (12/11/2023)    PRAPARE - Transportation     Lack of Transportation (Medical): No     Lack of Transportation (Non-Medical): No   Physical Activity: Inactive (12/11/2023)    Exercise Vital Sign     Days of Exercise per Week: 0 days     Minutes of Exercise per Session: 0 min   Stress: Stress Concern Present (12/11/2023)    [employer]izen of [employer] [place] [employer] - Occupational Stress Questionnaire     Feeling of Stress : Very much   Housing Stability: Low Risk  (12/11/2023)    Housing Stability Vital Sign     Unable to Pay for Housing in the Last Year: No     Number of Places Lived in the Last Year: 1     Unstable Housing in the Last Year: No       MEDICATIONS:   Current Outpatient Medications:     AUVELITY  mg TbIE, Take  mg by mouth 2 (two) times a day., Disp: , Rfl:     diazePAM (VALIUM) 5 MG tablet, Take 5 mg by mouth every 6 (six) hours as needed for Anxiety., Disp: , Rfl:     sumatriptan (IMITREX) 100 MG tablet, Take 1 tab at first sign of migraine may repeat 2 hours later if needed, Disp: 12 tablet, Rfl: 5    zolpidem (AMBIEN) 10 mg Tab, Take 10 mg by mouth nightly as needed., Disp: , Rfl:     atogepant 30 mg Tab, Take 30 mg by mouth once  "daily. (Patient not taking: Reported on 5/15/2024), Disp: 30 tablet, Rfl: 5    naproxen (EC NAPROSYN) 500 MG EC tablet, Take 500 mg by mouth 2 (two) times daily. (Patient not taking: Reported on 5/24/2024), Disp: , Rfl:   ALLERGIES: Review of patient's allergies indicates:  No Known Allergies    VITAL SIGNS: BP (!) 137/94   Pulse 92   Ht 5' 6" (1.676 m)   LMP 03/17/2024   BMI 48.86 kg/m²      PHYSICAL EXAMINATION  VITAL SIGNS: BP (!) 137/94   Pulse 92   Ht 5' 6" (1.676 m)   LMP 03/17/2024   BMI 48.86 kg/m²    General:  The patient is alert and oriented x 3.  Mood is pleasant.  Observation of ears, eyes and nose reveal no gross abnormalities.  HEENT: NCAT, sclera nonicteric  Lungs: Respirations are equal and unlabored.    Left KNEE EXAMINATION     OBSERVATION / INSPECTION   Gait:   Nonantalgic   Alignment:  Neutral   Scars:   None   Muscle atrophy: Mild  Effusion:  None   Warmth:  None   Discoloration:   none     TENDERNESS / CREPITUS (T / C):          T / C      T / C   Patella   - / -   Lateral joint line   - / -    Peripatellar medial  -  Medial joint line    - / -    Peripatellar lateral +  Medial plica   - / -    Patellar tendon -   Popliteal fossa  - / -    Quad tendon   -   Gastrocnemius   -   Prepatellar Bursa - / -   Quadricep   -   Tibial tubercle  -  Thigh/hamstring  -   Pes anserine/HS -  Fibula    -   ITB   - / -  Tibia     -   Tib/fib joint  - / -  LCL    -     MFC   - / -   MCL: Proximal  -    LFC   - / -    Distal   -          ROM: (* = pain)  PASSIVE   ACTIVE    Left :   5 / 0 / 145   5 / 0 / 145     Right :    5 / 0 / 145   5 / 0 / 145    PATELLOFEMORAL EXAMINATION:  See above noted areas of tenderness.   Patella position    Subluxation / dislocation: Centered           Sup. / Inf;   Normal   Crepitus (PF):    Absent   Patellar Mobility:       Medial-lateral:   Normal    Superior-inferior:  Normal    Inferior tilt   Normal    Patellar tendon:  Normal   Lateral tilt:    Normal "   J-sign:     None   Patellofemoral grind:   No pain       MENISCAL SIGNS:     Pain on terminal extension:  +  Pain on terminal flexion:  +  Merys maneuver:  + for pain  Squat     + posterior joint pain    LIGAMENT EXAMINATION:  ACL / Lachman:  normal (-1 to 2mm)    PCL-Post.  drawer: normal 0 to 2mm  MCL- Valgus:  normal 0 to 2mm  LCL- Varus:  normal 0 to 2mm  Pivot shift: normal (Equal)   Dial Test: difference c/w other side   At 30° flexion: normal (< 5°)    At 90° flexion: normal (< 5°)   Reverse Pivot Shift:   normal (Equal)     STRENGTH: (* = with pain) PAINFUL SIDE   Quadricep   5/5   Hamstrin/5    EXTREMITY NEURO-VASCULAR EXAMINATION:   Sensation:  Grossly intact to light touch all dermatomal regions.   Motor Function:  Fully intact motor function at hip, knee, foot and ankle    DTRs;  quadriceps and  achilles 2+.  No clonus and downgoing Babinski.    Vascular status:  DP and PT pulses 2+, brisk capillary refill, symmetric.     Other Findings:     X-rays reviewed and interpreted personally by me:  including standing, weight bearing AP and flexion bilateral knees, lateral and merchant views ordered and images reviewed by me show:  No fracture, dislocation     MRI Left Knee:  1. Patellofemoral and medial tibiofemoral chondral fissuring.  2. Blunting of the medial meniscus body segment free edge.    ASSESSMENT:    Right Knee  Patellar Tendonitis     PLAN:   -PT  -Return PRN  -Script for Mobic sent to pharmacy    All questions were answered, pt will contact us for questions or concerns in the interim.    I made the decision to obtain old records of the patient including previous notes and imaging. New imaging was ordered today of the extremity or extremities evaluated. I independently reviewed and interpreted the radiographs and/or MRIs today as well as prior imaging.

## 2024-05-31 ENCOUNTER — OFFICE VISIT (OUTPATIENT)
Dept: OBSTETRICS AND GYNECOLOGY | Facility: CLINIC | Age: 34
End: 2024-05-31
Payer: COMMERCIAL

## 2024-05-31 ENCOUNTER — LAB VISIT (OUTPATIENT)
Dept: LAB | Facility: HOSPITAL | Age: 34
End: 2024-05-31
Attending: OBSTETRICS & GYNECOLOGY
Payer: COMMERCIAL

## 2024-05-31 VITALS — WEIGHT: 293 LBS | SYSTOLIC BLOOD PRESSURE: 124 MMHG | BODY MASS INDEX: 49.1 KG/M2 | DIASTOLIC BLOOD PRESSURE: 86 MMHG

## 2024-05-31 DIAGNOSIS — Z11.3 SCREEN FOR STD (SEXUALLY TRANSMITTED DISEASE): ICD-10-CM

## 2024-05-31 DIAGNOSIS — Z01.419 ENCOUNTER FOR GYNECOLOGICAL EXAMINATION WITHOUT ABNORMAL FINDING: Primary | ICD-10-CM

## 2024-05-31 DIAGNOSIS — N93.9 ABNORMAL UTERINE BLEEDING (AUB): ICD-10-CM

## 2024-05-31 DIAGNOSIS — Z12.39 SCREENING BREAST EXAMINATION: ICD-10-CM

## 2024-05-31 DIAGNOSIS — D25.9 UTERINE LEIOMYOMA, UNSPECIFIED LOCATION: ICD-10-CM

## 2024-05-31 LAB
HBV SURFACE AG SERPL QL IA: NORMAL
HCV AB SERPL QL IA: NORMAL
HIV 1+2 AB+HIV1 P24 AG SERPL QL IA: NORMAL
TREPONEMA PALLIDUM IGG+IGM AB [PRESENCE] IN SERUM OR PLASMA BY IMMUNOASSAY: NONREACTIVE

## 2024-05-31 PROCEDURE — 86803 HEPATITIS C AB TEST: CPT | Performed by: OBSTETRICS & GYNECOLOGY

## 2024-05-31 PROCEDURE — 88141 CYTOPATH C/V INTERPRET: CPT | Mod: ,,, | Performed by: PATHOLOGY

## 2024-05-31 PROCEDURE — 86593 SYPHILIS TEST NON-TREP QUANT: CPT | Performed by: OBSTETRICS & GYNECOLOGY

## 2024-05-31 PROCEDURE — 87491 CHLMYD TRACH DNA AMP PROBE: CPT | Performed by: OBSTETRICS & GYNECOLOGY

## 2024-05-31 PROCEDURE — 99395 PREV VISIT EST AGE 18-39: CPT | Mod: S$GLB,,, | Performed by: OBSTETRICS & GYNECOLOGY

## 2024-05-31 PROCEDURE — 88175 CYTOPATH C/V AUTO FLUID REDO: CPT | Performed by: PATHOLOGY

## 2024-05-31 PROCEDURE — 99999 PR PBB SHADOW E&M-EST. PATIENT-LVL III: CPT | Mod: PBBFAC,,, | Performed by: OBSTETRICS & GYNECOLOGY

## 2024-05-31 PROCEDURE — 87624 HPV HI-RISK TYP POOLED RSLT: CPT | Performed by: OBSTETRICS & GYNECOLOGY

## 2024-05-31 PROCEDURE — 87591 N.GONORRHOEAE DNA AMP PROB: CPT | Performed by: OBSTETRICS & GYNECOLOGY

## 2024-05-31 PROCEDURE — 36415 COLL VENOUS BLD VENIPUNCTURE: CPT | Performed by: OBSTETRICS & GYNECOLOGY

## 2024-05-31 PROCEDURE — 87340 HEPATITIS B SURFACE AG IA: CPT | Performed by: OBSTETRICS & GYNECOLOGY

## 2024-05-31 PROCEDURE — 87389 HIV-1 AG W/HIV-1&-2 AB AG IA: CPT | Performed by: OBSTETRICS & GYNECOLOGY

## 2024-05-31 RX ORDER — DROSPIRENONE AND ETHINYL ESTRADIOL 0.03MG-3MG
1 KIT ORAL DAILY
Qty: 30 TABLET | Refills: 11 | Status: SHIPPED | OUTPATIENT
Start: 2024-05-31 | End: 2025-05-31

## 2024-05-31 NOTE — PROGRESS NOTES
Subjective:       Patient ID: Stephan Wallace is a 33 y.o. female.    Chief Complaint:  Well Woman      History of Present Illness  HPI  Annual Exam-Premenopausal  Patient presents for annual exam. The patient has no complaints today. The patient is sexually active. GYN screening history: last pap: was normal. The patient wears seatbelts: yes. The patient participates in regular exercise: yes. Has the patient ever been transfused or tattooed?: no. The patient reports that there is not domestic violence in her life.      Has known uterine fibroid.      Reports having irregular bleeding today.  Has had two menses in one month.      GYN & OB History  Patient's last menstrual period was 2024.   Date of Last Pap: 2024    OB History    Para Term  AB Living   0 0 0 0 0 0   SAB IAB Ectopic Multiple Live Births   0 0 0 0 0     Past Medical History:  Past Medical History:   Diagnosis Date    Anxiety     Depression        Past Surgical History:  History reviewed. No pertinent surgical history.    Family History:  No family history on file.    Allergies:  Review of patient's allergies indicates:  No Known Allergies    Medications:  Current Outpatient Medications on File Prior to Visit   Medication Sig Dispense Refill    AUVELITY  mg TbIE Take  mg by mouth 2 (two) times a day.      diazePAM (VALIUM) 5 MG tablet Take 5 mg by mouth every 6 (six) hours as needed for Anxiety.      meloxicam (MOBIC) 7.5 MG tablet Take 1 tablet (7.5 mg total) by mouth once daily. 60 tablet 2    sumatriptan (IMITREX) 100 MG tablet Take 1 tab at first sign of migraine may repeat 2 hours later if needed 12 tablet 5    zolpidem (AMBIEN) 10 mg Tab Take 10 mg by mouth nightly as needed.      [DISCONTINUED] atogepant 30 mg Tab Take 30 mg by mouth once daily. (Patient not taking: Reported on 5/15/2024) 30 tablet 5    [DISCONTINUED] naproxen (EC NAPROSYN) 500 MG EC tablet Take 500 mg by mouth 2 (two) times daily.  (Patient not taking: Reported on 5/24/2024)       No current facility-administered medications on file prior to visit.       Social History:  Social History     Tobacco Use    Smoking status: Never    Smokeless tobacco: Never   Substance Use Topics    Drug use: Never            Review of Systems  Review of Systems   Constitutional: Negative.    HENT: Negative.     Eyes: Negative.    Respiratory: Negative.     Cardiovascular: Negative.    Gastrointestinal: Negative.    Endocrine: Negative.    Genitourinary: Negative.    Musculoskeletal: Negative.    Integumentary:  Negative.   Neurological: Negative.    Hematological: Negative.    Psychiatric/Behavioral: Negative.     Breast: negative.            Objective:    Physical Exam:   Constitutional: She is oriented to person, place, and time. She appears well-nourished.    HENT:   Head: Normocephalic and atraumatic.    Eyes: EOM are normal. Right eye exhibits normal extraocular motion. Left eye exhibits normal extraocular motion.    Neck: No thyromegaly present.    Cardiovascular:  Normal rate.             Pulmonary/Chest: Effort normal. No respiratory distress. Right breast exhibits no mass, no skin change and no tenderness. Left breast exhibits no mass, no skin change and no tenderness. Breasts are symmetrical.        Abdominal: Soft. She exhibits no distension and no mass. There is no abdominal tenderness.     Genitourinary:    Vagina, uterus, right adnexa and left adnexa normal.      Pelvic exam was performed with patient supine.   The external female genitalia was normal.   No external genitalia lesions identified,Genitalia hair distrobution normal .     Labial bartholins normal.There is no rash or lesion on the right labia. There is no rash or lesion on the left labia. Cervix is normal. Right adnexum displays no tenderness and no fullness. Left adnexum displays no tenderness and no fullness. No vaginal discharge or bleeding in the vagina.    No signs of injury in the  vagina.   Vagina was moist.Cervix exhibits no motion tenderness and no friability. Uterus consistancy normal and Uerus contour normal  Uterus is not tender. Normal urethral meatus.Urethral Meatus exhibits: urethral lesionUrethra findings: no urethral mass, no tenderness and prolapsedBladder findings: no bladder distention and no bladder tenderness          Musculoskeletal: Normal range of motion.      Lymphadenopathy:     She has no cervical adenopathy.    Neurological: She is oriented to person, place, and time.   Cranial Nerves II-XII grossly intact.    Skin: No rash noted. No erythema.    Psychiatric: She has a normal mood and affect. Her behavior is normal.          Assessment:        1. Encounter for gynecological examination without abnormal finding    2. Screening breast examination    3. Screen for STD (sexually transmitted disease)    4. Abnormal uterine bleeding (AUB)    5. Uterine leiomyoma, unspecified location                Plan:      1. Encounter for gynecological examination without abnormal finding  - Pap and HPV done today.  -   Screening tests as ordered.  - Diet and exercise encouraged.    Counseling: injury prevention: Driving under the influence of alcohol  Seatbelts  Stress management techniques  indications for and frequency of periodic gynecologic exam  reviewed current Pap guidelines. Explained new understanding of natural history of cervical disease and improved Paps. Recommended guideline concordant care.     - Liquid-Based Pap Smear, Screening  - HPV High Risk Genotypes, PCR    2. Screening breast examination  - Self breast exams encouraged     3. Screen for STD (sexually transmitted disease)  - C. trachomatis/N. gonorrhoeae by AMP DNA  - Hepatitis B Surface Antigen; Future  - Hepatitis C Antibody; Future  - HIV 1/2 Ag/Ab (4th Gen); Future  - Treponema Pallidium Antibodies IgG, IgM; Future    4. Abnormal uterine bleeding (AUB)  5. Uterine leiomyoma, unspecified location  - Pelvic  ultrasound ordered.  Will call patient with results.  - Patient instructed to call office if she has not heard anything 2 wks after ultrasound.  - US Pelvis Comp with Transvag NON-OB (xpd; Future  - drospirenone-ethinyl estradioL (FRANCESCA) 3-0.03 mg per tablet; Take 1 tablet by mouth once daily.  Dispense: 30 tablet; Refill: 11

## 2024-06-02 LAB
C TRACH DNA SPEC QL NAA+PROBE: NOT DETECTED
N GONORRHOEA DNA SPEC QL NAA+PROBE: NOT DETECTED

## 2024-06-04 ENCOUNTER — PATIENT MESSAGE (OUTPATIENT)
Dept: NEUROLOGY | Facility: CLINIC | Age: 34
End: 2024-06-04
Payer: COMMERCIAL

## 2024-06-06 LAB
HPV HR 12 DNA SPEC QL NAA+PROBE: NEGATIVE
HPV16 AG SPEC QL: NEGATIVE
HPV18 DNA SPEC QL NAA+PROBE: NEGATIVE

## 2024-06-11 LAB
FINAL PATHOLOGIC DIAGNOSIS: NORMAL
Lab: NORMAL

## 2024-06-12 ENCOUNTER — PATIENT MESSAGE (OUTPATIENT)
Dept: OBSTETRICS AND GYNECOLOGY | Facility: CLINIC | Age: 34
End: 2024-06-12
Payer: COMMERCIAL

## 2024-06-13 ENCOUNTER — PATIENT MESSAGE (OUTPATIENT)
Dept: OBSTETRICS AND GYNECOLOGY | Facility: CLINIC | Age: 34
End: 2024-06-13
Payer: COMMERCIAL

## 2024-06-18 ENCOUNTER — PATIENT MESSAGE (OUTPATIENT)
Dept: NEUROLOGY | Facility: CLINIC | Age: 34
End: 2024-06-18
Payer: COMMERCIAL

## 2024-06-18 ENCOUNTER — TELEPHONE (OUTPATIENT)
Dept: NEUROLOGY | Facility: CLINIC | Age: 34
End: 2024-06-18
Payer: COMMERCIAL

## 2024-06-18 DIAGNOSIS — G43.109 MIGRAINE WITH AURA AND WITHOUT STATUS MIGRAINOSUS, NOT INTRACTABLE: Primary | ICD-10-CM

## 2024-06-18 NOTE — TELEPHONE ENCOUNTER
Since last visit:     4/10/24 Telephone Encounter:   Called pt.   Insurance will not cover nurtec unless she has tried aimovig and ubrelvy.   Insurance will not cover ubrelvy unless she has tried 2 triptans. Has tried imitrex thus far.   Pt has been informed. She would like to try aimovig at this time.     4/12/24 Telephone Encounter:   Aimovig is complete plan/benefit exclusion. Qulipta is alternative.   Called pt and informed her, she is willing to try qulipta.   Started quilipta 30mg.     6/18/24: pt in need of quilipta refills, will place today.

## 2024-07-01 ENCOUNTER — OFFICE VISIT (OUTPATIENT)
Dept: FAMILY MEDICINE | Facility: CLINIC | Age: 34
End: 2024-07-01
Payer: COMMERCIAL

## 2024-07-01 ENCOUNTER — LAB VISIT (OUTPATIENT)
Dept: LAB | Facility: HOSPITAL | Age: 34
End: 2024-07-01
Attending: FAMILY MEDICINE
Payer: COMMERCIAL

## 2024-07-01 VITALS
HEIGHT: 66 IN | HEART RATE: 84 BPM | BODY MASS INDEX: 46.95 KG/M2 | TEMPERATURE: 98 F | DIASTOLIC BLOOD PRESSURE: 86 MMHG | WEIGHT: 292.13 LBS | OXYGEN SATURATION: 99 % | SYSTOLIC BLOOD PRESSURE: 122 MMHG

## 2024-07-01 DIAGNOSIS — Z00.00 ANNUAL PHYSICAL EXAM: ICD-10-CM

## 2024-07-01 DIAGNOSIS — Z00.00 ANNUAL PHYSICAL EXAM: Primary | ICD-10-CM

## 2024-07-01 DIAGNOSIS — F44.9 DISSOCIATIVE AND CONVERSION DISORDER, UNSPECIFIED: ICD-10-CM

## 2024-07-01 DIAGNOSIS — D21.9 FIBROIDS: ICD-10-CM

## 2024-07-01 LAB
ALBUMIN SERPL BCP-MCNC: 3.3 G/DL (ref 3.5–5.2)
ALP SERPL-CCNC: 92 U/L (ref 55–135)
ALT SERPL W/O P-5'-P-CCNC: 23 U/L (ref 10–44)
ANION GAP SERPL CALC-SCNC: 7 MMOL/L (ref 8–16)
AST SERPL-CCNC: 17 U/L (ref 10–40)
BASOPHILS # BLD AUTO: 0.03 K/UL (ref 0–0.2)
BASOPHILS NFR BLD: 0.4 % (ref 0–1.9)
BILIRUB SERPL-MCNC: 0.3 MG/DL (ref 0.1–1)
BUN SERPL-MCNC: 10 MG/DL (ref 6–20)
CALCIUM SERPL-MCNC: 9.4 MG/DL (ref 8.7–10.5)
CHLORIDE SERPL-SCNC: 108 MMOL/L (ref 95–110)
CHOLEST SERPL-MCNC: 182 MG/DL (ref 120–199)
CHOLEST/HDLC SERPL: 4 {RATIO} (ref 2–5)
CO2 SERPL-SCNC: 26 MMOL/L (ref 23–29)
CREAT SERPL-MCNC: 0.9 MG/DL (ref 0.5–1.4)
DIFFERENTIAL METHOD BLD: ABNORMAL
EOSINOPHIL # BLD AUTO: 0.1 K/UL (ref 0–0.5)
EOSINOPHIL NFR BLD: 0.9 % (ref 0–8)
ERYTHROCYTE [DISTWIDTH] IN BLOOD BY AUTOMATED COUNT: 15.1 % (ref 11.5–14.5)
EST. GFR  (NO RACE VARIABLE): >60 ML/MIN/1.73 M^2
ESTIMATED AVG GLUCOSE: 108 MG/DL (ref 68–131)
GLUCOSE SERPL-MCNC: 85 MG/DL (ref 70–110)
HBA1C MFR BLD: 5.4 % (ref 4–5.6)
HCT VFR BLD AUTO: 42 % (ref 37–48.5)
HDLC SERPL-MCNC: 45 MG/DL (ref 40–75)
HDLC SERPL: 24.7 % (ref 20–50)
HGB BLD-MCNC: 12.2 G/DL (ref 12–16)
IMM GRANULOCYTES # BLD AUTO: 0.02 K/UL (ref 0–0.04)
IMM GRANULOCYTES NFR BLD AUTO: 0.3 % (ref 0–0.5)
LDLC SERPL CALC-MCNC: 109.4 MG/DL (ref 63–159)
LYMPHOCYTES # BLD AUTO: 1.9 K/UL (ref 1–4.8)
LYMPHOCYTES NFR BLD: 23.5 % (ref 18–48)
MCH RBC QN AUTO: 24.3 PG (ref 27–31)
MCHC RBC AUTO-ENTMCNC: 29 G/DL (ref 32–36)
MCV RBC AUTO: 84 FL (ref 82–98)
MONOCYTES # BLD AUTO: 0.4 K/UL (ref 0.3–1)
MONOCYTES NFR BLD: 5.2 % (ref 4–15)
NEUTROPHILS # BLD AUTO: 5.6 K/UL (ref 1.8–7.7)
NEUTROPHILS NFR BLD: 69.7 % (ref 38–73)
NONHDLC SERPL-MCNC: 137 MG/DL
NRBC BLD-RTO: 0 /100 WBC
PLATELET # BLD AUTO: 375 K/UL (ref 150–450)
PMV BLD AUTO: 9.6 FL (ref 9.2–12.9)
POTASSIUM SERPL-SCNC: 4.8 MMOL/L (ref 3.5–5.1)
PROT SERPL-MCNC: 7.5 G/DL (ref 6–8.4)
RBC # BLD AUTO: 5.02 M/UL (ref 4–5.4)
SODIUM SERPL-SCNC: 141 MMOL/L (ref 136–145)
T4 FREE SERPL-MCNC: 0.88 NG/DL (ref 0.71–1.51)
TRIGL SERPL-MCNC: 138 MG/DL (ref 30–150)
TSH SERPL DL<=0.005 MIU/L-ACNC: 2.09 UIU/ML (ref 0.4–4)
WBC # BLD AUTO: 7.96 K/UL (ref 3.9–12.7)

## 2024-07-01 PROCEDURE — 80053 COMPREHEN METABOLIC PANEL: CPT | Performed by: FAMILY MEDICINE

## 2024-07-01 PROCEDURE — 80061 LIPID PANEL: CPT | Performed by: FAMILY MEDICINE

## 2024-07-01 PROCEDURE — 85025 COMPLETE CBC W/AUTO DIFF WBC: CPT | Performed by: FAMILY MEDICINE

## 2024-07-01 PROCEDURE — 99385 PREV VISIT NEW AGE 18-39: CPT | Mod: S$GLB,,, | Performed by: FAMILY MEDICINE

## 2024-07-01 PROCEDURE — 36415 COLL VENOUS BLD VENIPUNCTURE: CPT | Mod: PO | Performed by: FAMILY MEDICINE

## 2024-07-01 PROCEDURE — 84443 ASSAY THYROID STIM HORMONE: CPT | Performed by: FAMILY MEDICINE

## 2024-07-01 PROCEDURE — 99999 PR PBB SHADOW E&M-EST. PATIENT-LVL IV: CPT | Mod: PBBFAC,,, | Performed by: FAMILY MEDICINE

## 2024-07-01 PROCEDURE — 83036 HEMOGLOBIN GLYCOSYLATED A1C: CPT | Performed by: FAMILY MEDICINE

## 2024-07-01 PROCEDURE — 84439 ASSAY OF FREE THYROXINE: CPT | Performed by: FAMILY MEDICINE

## 2024-07-01 NOTE — PROGRESS NOTES
Subjective     Patient ID: Stephan Wallace is a 33 y.o. female.    Chief Complaint: Establish Care    33 year old female presents for an annual exam.       She has issues with eyebrow raising and tension int he back of her neck. She gets movement in her arms and her feet. She states it feels like a circuit and it will cycle through her body. She has had this for the last year or two. She has suffered with depression. She had a drug and alcohol induced psychosis       Past Medical History:   Diagnosis Date    Anxiety     Depression     Dissociative and conversion disorder, unspecified     Fibroids       History reviewed. No pertinent surgical history.  Family History   Problem Relation Name Age of Onset    No Known Problems Mother      No Known Problems Father      No Known Problems Sister      No Known Problems Brother       Social History     Socioeconomic History    Marital status: Single    Number of children: 0    Highest education level: Associate degree: academic program   Tobacco Use    Smoking status: Never    Smokeless tobacco: Never   Substance and Sexual Activity    Alcohol use: Not Currently     Alcohol/week: 7.0 standard drinks of alcohol     Types: 4 Glasses of wine, 3 Drinks containing 0.5 oz of alcohol per week    Drug use: Not Currently     Frequency: 4.0 times per week     Types: Marijuana    Sexual activity: Yes     Partners: Male     Birth control/protection: Coitus interruptus, Condom, OCP   Social History Narrative    She is currently working right now.      Social Determinants of Health     Financial Resource Strain: Medium Risk (12/11/2023)    Overall Financial Resource Strain (CARDIA)     Difficulty of Paying Living Expenses: Somewhat hard   Food Insecurity: No Food Insecurity (12/11/2023)    Hunger Vital Sign     Worried About Running Out of Food in the Last Year: Never true     Ran Out of Food in the Last Year: Never true   Transportation Needs: No Transportation Needs (12/11/2023)     "PRAPARE - Transportation     Lack of Transportation (Medical): No     Lack of Transportation (Non-Medical): No   Physical Activity: Inactive (12/11/2023)    Exercise Vital Sign     Days of Exercise per Week: 0 days     Minutes of Exercise per Session: 0 min   Stress: Stress Concern Present (12/11/2023)    [employer] - Occupational Stress Questionnaire     Feeling of Stress : Very much   Housing Stability: Low Risk  (12/11/2023)    Housing Stability Vital Sign     Unable to Pay for Housing in the Last Year: No     Number of Places Lived in the Last Year: 1     Unstable Housing in the Last Year: No       Review of Systems   Constitutional:  Negative for chills, fatigue and fever.   Respiratory:  Negative for cough, chest tightness, shortness of breath and wheezing.    Cardiovascular:  Negative for chest pain, palpitations and leg swelling.   Gastrointestinal:  Negative for abdominal pain, blood in stool, diarrhea, nausea and vomiting.   Endocrine: Positive for polydipsia and polyuria.   Genitourinary:  Negative for dysuria, frequency, hematuria and vaginal discharge.   Musculoskeletal:  Negative for arthralgias and myalgias.   Integumentary:  Negative for rash.   Neurological:  Negative for dizziness, syncope and light-headedness.          Objective   Vitals:    07/01/24 0854   BP: 122/86   Pulse: 84   Temp: 98.3 °F (36.8 °C)   TempSrc: Oral   SpO2: 99%   Weight: 132.5 kg (292 lb 1.8 oz)   Height: 5' 6" (1.676 m)       Physical Exam  Constitutional:       General: She is not in acute distress.     Appearance: She is well-developed. She is not diaphoretic.   HENT:      Head: Normocephalic.      Right Ear: External ear normal.      Left Ear: External ear normal.      Mouth/Throat:      Pharynx: No oropharyngeal exudate.   Eyes:      Conjunctiva/sclera: Conjunctivae normal.   Neck:      Thyroid: No thyromegaly.   Cardiovascular:      Rate and Rhythm: Normal rate and regular rhythm.      " Heart sounds: Normal heart sounds. No murmur heard.     No friction rub. No gallop.   Pulmonary:      Effort: Pulmonary effort is normal. No respiratory distress.      Breath sounds: Normal breath sounds. No stridor. No wheezing, rhonchi or rales.   Abdominal:      General: Bowel sounds are normal. There is no distension.      Palpations: Abdomen is soft. There is no mass.      Tenderness: There is no abdominal tenderness. There is no guarding or rebound.      Hernia: No hernia is present.   Musculoskeletal:      Cervical back: Normal range of motion and neck supple.      Right lower leg: No edema.      Left lower leg: No edema.   Lymphadenopathy:      Cervical: No cervical adenopathy.   Skin:     Findings: No rash.   Neurological:      Mental Status: She is alert.   Psychiatric:         Mood and Affect: Mood normal.            Assessment and Plan     1. Annual physical exam  -     CBC Auto Differential; Future; Expected date: 07/01/2024  -     Comprehensive Metabolic Panel; Future; Expected date: 07/01/2024  -     LIPID PANEL; Future; Expected date: 07/01/2024  -     TSH; Future; Expected date: 07/01/2024  -     Hemoglobin A1C; Future; Expected date: 07/01/2024  -     T4, FREE; Future; Expected date: 07/01/2024    2. Dissociative and conversion disorder, unspecified    3. Fibroids                 No follow-ups on file.

## 2024-07-08 ENCOUNTER — PATIENT MESSAGE (OUTPATIENT)
Dept: FAMILY MEDICINE | Facility: CLINIC | Age: 34
End: 2024-07-08
Payer: COMMERCIAL

## 2024-07-16 ENCOUNTER — PATIENT MESSAGE (OUTPATIENT)
Dept: OBSTETRICS AND GYNECOLOGY | Facility: CLINIC | Age: 34
End: 2024-07-16
Payer: COMMERCIAL

## 2024-07-17 ENCOUNTER — PATIENT MESSAGE (OUTPATIENT)
Dept: NEUROLOGY | Facility: CLINIC | Age: 34
End: 2024-07-17
Payer: COMMERCIAL

## 2024-07-19 ENCOUNTER — ANESTHESIA EVENT (OUTPATIENT)
Dept: SURGERY | Facility: HOSPITAL | Age: 34
End: 2024-07-19
Payer: COMMERCIAL

## 2024-07-23 ENCOUNTER — PATIENT MESSAGE (OUTPATIENT)
Dept: OBSTETRICS AND GYNECOLOGY | Facility: CLINIC | Age: 34
End: 2024-07-23
Payer: COMMERCIAL

## 2024-07-23 NOTE — TELEPHONE ENCOUNTER
Spoke to Pt about scheduling a follow up appt. Pt is scheduled for a virtual visit on August 2. Pt was informed that she can log in up to 15 min early and informed about the 15 min mariya period.

## 2024-07-26 DIAGNOSIS — G43.109 MIGRAINE WITH AURA AND WITHOUT STATUS MIGRAINOSUS, NOT INTRACTABLE: ICD-10-CM

## 2024-07-30 RX ORDER — SUMATRIPTAN SUCCINATE 100 MG/1
TABLET ORAL
Qty: 12 TABLET | Refills: 5 | Status: SHIPPED | OUTPATIENT
Start: 2024-07-30

## 2024-08-02 ENCOUNTER — OFFICE VISIT (OUTPATIENT)
Dept: NEUROLOGY | Facility: CLINIC | Age: 34
End: 2024-08-02
Payer: COMMERCIAL

## 2024-08-02 DIAGNOSIS — G43.109 MIGRAINE WITH AURA AND WITHOUT STATUS MIGRAINOSUS, NOT INTRACTABLE: ICD-10-CM

## 2024-08-02 RX ORDER — ATOGEPANT 60 MG/1
60 TABLET ORAL DAILY
Qty: 30 TABLET | Refills: 5 | Status: SHIPPED | OUTPATIENT
Start: 2024-08-02 | End: 2025-01-29

## 2024-08-02 RX ORDER — UBROGEPANT 50 MG/1
TABLET ORAL
Qty: 16 TABLET | Refills: 5 | Status: SHIPPED | OUTPATIENT
Start: 2024-08-02

## 2024-08-02 NOTE — PROGRESS NOTES
Established Patient     The patient location is: [place]  The chief complaint leading to consultation is: ha f/up    Visit type: audiovisual    Face to Face time with patient: 18 min  20 minutes of total time spent on the encounter, which includes face to face time and non-face to face time preparing to see the patient (eg, review of tests), Obtaining and/or reviewing separately obtained history, Documenting clinical information in the electronic or other health record, Independently interpreting results (not separately reported) and communicating results to the patient/family/caregiver, or Care coordination (not separately reported).         Each patient to whom he or she provides medical services by telemedicine is:  (1) informed of the relationship between the physician and patient and the respective role of any other health care provider with respect to management of the patient; and (2) notified that he or she may decline to receive medical services by telemedicine and may withdraw from such care at any time.    Notes:     SUBJECTIVE:  Patient ID: Stephan Wallace   Chief Complaint: Headache    History of Present Illness:  Stephan Wallace is a 34 y.o. female with migraines, depression, anxiety who presents to [facility] for follow-up of headaches.       08/02/2024 - Interval History:  Ha's have improved since starting quilipta, per HA diary they are occurring 13-16/30 days per month. Insurance requires scripts sent to [facility]. Was unable to receive the Imitrex NS.   Ubrelvy 50mg - cannot recall trying it  Imitrex 100mg- helps  Quilipta 30mg- helps  Plan: increase quilipta 60mg/d, try ubrelvy 50mg prn, continue imitrex tabs prn, continue to track ha's, rtc 2-3 mo or sooner if needed    4/12/24 Telephone Encounter:   Aimovig is complete plan/benefit exclusion. Qulipta is alternative.   Called pt and informed her, she is willing to try qulipta.   Started quilipta 30mg.     4/10/24 Telephone Encounter:   Called pt.    Insurance will not cover nurtec unless she has tried aimovig and ubrelvy.   Insurance will not cover ubrelvy unless she has tried 2 triptans. Has tried imitrex thus far.   Pt has been informed. She would like to try aimovig at this time.     04/05/2024 - Interval History:  Pt not currently tracking. Is unsure of frequency. Did have 2 HA's last week lasting 2 days each. Had 6 HA's in the last month lasting days each. Discussed options w/ pt. Including preventatives and abortives and previous insurance responses (nurtec not approved until tried aimovig and ubrelvy first). Pt would like to try ubrelvy and track ha's.   Plan: try ubrelvy 50mg prn, continue imitrex tabs prn, track ha's, rtc 2-3 mo or sooner if needed    12/11/2023 - Interval History:  Pt has not been taking emgality for many months. Is unsure when she stopped taking it. Of note, they were very painful for her, she would like to avoid injectable options if possible. Ha's are occurring approximately 8/30 days per month. Pt is tracking, will send diary via [email or web address].   Nurtec - hasn't tried yet  Imitrex NS - hasn't tried yet  Imitrex tabs - help better than anything else taken so far  Plan: try nurtec preventatively, continue imitrex tabs prn, track ha's, rtc 2-3 mo or sooner if needed    11/13/2023 - Interval History:  Pt's ha's worsened in the last month, denies any changes at the time. Ha's occurring 17/30 days this month. Imitrex is helping, but is temporary.   Discussed otpions. Would like to try nurtec instead of emgality 2/2 painful when injecting.   Plan: try nurtec preventatively (if insurance denies, is amenable to injectable cgrp inhibitor), continue imitrex tabs prn, will try imitrex NS as 2nd line, rtc 2-3 mo or sooner if needed    06/20/2023 - Interval History:  Pt completed 3 rounds of emgality after last visit which helped greatly. She stopped taking it approximately 3 months ago. Ha's have been well controlled since last visit, occurring  1-2 times a month. Resolvign w/ imitrex.   Continues to follow w/ neurology regarding abnormal movements.   Plan: continue imitrex prn, continue neuro recs, rtc prn    Recommendations made at last Office Visit on 1/25/23:  - Discussed symptoms appear to be consistent with migraines, discussed treatment options and patient agreed with the following plan:  - ppx - start emgality  - abortive - continue imitrex 100mg tab prn  - anxiety/depression - continue cymbalta 60mg/d, mgmt per psychiatrist  - risks, benefits, and potential side effects of emgality, imitrex discussed   - alternative treatment options offered   - importance of healthy diet, regular exercise and sleep hygiene in the treatment of headaches    - Start tracking headaches via [facility] [employer] on phone   - RTC in 4 mo or sooner if needed     Treatments Tried:  Emgality - completed 3 rounds, helped,  painful  Tpx - face tingling  Cymbalta 60mg/d - helps depression/anxiety  Quilipta 30mg - helps  Imitrex 100mg - helps  Ubrelvy 50mg - cannot recall trying  Excedrin  Advil     Current Medications:    Current Outpatient Medications:     atogepant (QULIPTA) 60 mg Tab, Take 1 tablet (60 mg total) by mouth once daily., Disp: 30 tablet, Rfl: 5    AUVELITY  mg TbIE, Take  mg by mouth 2 (two) times a day., Disp: , Rfl:     diazePAM (VALIUM) 5 MG tablet, Take 5 mg by mouth every 6 (six) hours as needed for Anxiety., Disp: , Rfl:     drospirenone-ethinyl estradioL (FRANCESCA) 3-0.03 mg per tablet, Take 1 tablet by mouth once daily., Disp: 30 tablet, Rfl: 11    meloxicam (MOBIC) 7.5 MG tablet, Take 1 tablet (7.5 mg total) by mouth once daily., Disp: 60 tablet, Rfl: 2    sumatriptan (IMITREX) 100 MG tablet, TAKE 1 TAB AT FIRST SIGN OF MIGRAINE MAY REPEAT 2 HOURS LATER IF NEEDED, Disp: 12 tablet, Rfl: 5    ubrogepant (UBRELVY) 50 mg tablet, Take 1 tablet by mouth at the onset of a headache. May repeat based on response and tolerability after more than 2 hours  if needed. Do not take more than 200mg in a 24 hour span., Disp: 16 tablet, Rfl: 5    zolpidem (AMBIEN) 10 mg Tab, Take 10 mg by mouth nightly as needed., Disp: , Rfl:     Review of Systems - as per HPI, otherwise a balanced 10 systems review is negative.    OBJECTIVE:  Vitals:  There were no vitals taken for this visit.     Physical Exam:  Constitutional: she appears well-developed and well-nourished. she is well groomed. NAD   HENT:    Head: Normocephalic and atraumatic  Eyes: Conjunctivae and EOM are normal  Musculoskeletal: Normal range of motion. No joint stiffness.   Skin: Skin is warm and dry.  Psychiatric: Mood and affect are normal    Neuro: Patient is alert and oriented to person, place, and time. Language is intact and fluent. Speech is clear and fluent. Recent and remote memory are intact.  Normal attention and concentration.  Facial movement is symmetric. Moves all 4 extremities against gravity. Gait and station normal.  Cranial Nerves II through XII without focal deficit.     Review of Data:   Notes from neuro reviewed   Labs:  Lab Visit on 07/01/2024   Component Date Value Ref Range Status    WBC 07/01/2024 7.96  3.90 - 12.70 K/uL Final    RBC 07/01/2024 5.02  4.00 - 5.40 M/uL Final    Hemoglobin 07/01/2024 12.2  12.0 - 16.0 g/dL Final    Hematocrit 07/01/2024 42.0  37.0 - 48.5 % Final    MCV 07/01/2024 84  82 - 98 fL Final    MCH 07/01/2024 24.3 (L)  27.0 - 31.0 pg Final    MCHC 07/01/2024 29.0 (L)  32.0 - 36.0 g/dL Final    RDW 07/01/2024 15.1 (H)  11.5 - 14.5 % Final    Platelets 07/01/2024 375  150 - 450 K/uL Final    MPV 07/01/2024 9.6  9.2 - 12.9 fL Final    Immature Granulocytes 07/01/2024 0.3  0.0 - 0.5 % Final    Gran # (ANC) 07/01/2024 5.6  1.8 - 7.7 K/uL Final    Immature Grans (Abs) 07/01/2024 0.02  0.00 - 0.04 K/uL Final    Lymph # 07/01/2024 1.9  1.0 - 4.8 K/uL Final    Mono # 07/01/2024 0.4  0.3 - 1.0 K/uL Final    Eos # 07/01/2024 0.1  0.0 - 0.5 K/uL Final    Baso # 07/01/2024 0.03   0.00 - 0.20 K/uL Final    nRBC 07/01/2024 0  0 /100 WBC Final    Gran % 07/01/2024 69.7  38.0 - 73.0 % Final    Lymph % 07/01/2024 23.5  18.0 - 48.0 % Final    Mono % 07/01/2024 5.2  4.0 - 15.0 % Final    Eosinophil % 07/01/2024 0.9  0.0 - 8.0 % Final    Basophil % 07/01/2024 0.4  0.0 - 1.9 % Final    Differential Method 07/01/2024 Automated   Final    Sodium 07/01/2024 141  136 - 145 mmol/L Final    Potassium 07/01/2024 4.8  3.5 - 5.1 mmol/L Final    Chloride 07/01/2024 108  95 - 110 mmol/L Final    CO2 07/01/2024 26  23 - 29 mmol/L Final    Glucose 07/01/2024 85  70 - 110 mg/dL Final    BUN 07/01/2024 10  6 - 20 mg/dL Final    Creatinine 07/01/2024 0.9  0.5 - 1.4 mg/dL Final    Calcium 07/01/2024 9.4  8.7 - 10.5 mg/dL Final    Total Protein 07/01/2024 7.5  6.0 - 8.4 g/dL Final    Albumin 07/01/2024 3.3 (L)  3.5 - 5.2 g/dL Final    Total Bilirubin 07/01/2024 0.3  0.1 - 1.0 mg/dL Final    Alkaline Phosphatase 07/01/2024 92  55 - 135 U/L Final    AST 07/01/2024 17  10 - 40 U/L Final    ALT 07/01/2024 23  10 - 44 U/L Final    eGFR 07/01/2024 >60  >60 mL/min/1.73 m^2 Final    Anion Gap 07/01/2024 7 (L)  8 - 16 mmol/L Final    Cholesterol 07/01/2024 182  120 - 199 mg/dL Final    Triglycerides 07/01/2024 138  30 - 150 mg/dL Final    HDL 07/01/2024 45  40 - 75 mg/dL Final    LDL Cholesterol 07/01/2024 109.4  63.0 - 159.0 mg/dL Final    HDL/Cholesterol Ratio 07/01/2024 24.7  20.0 - 50.0 % Final    Total Cholesterol/HDL Ratio 07/01/2024 4.0  2.0 - 5.0 Final    Non-HDL Cholesterol 07/01/2024 137  mg/dL Final    TSH 07/01/2024 2.086  0.400 - 4.000 uIU/mL Final    Hemoglobin A1C 07/01/2024 5.4  4.0 - 5.6 % Final    Estimated Avg Glucose 07/01/2024 108  68 - 131 mg/dL Final    Free T4 07/01/2024 0.88  0.71 - 1.51 ng/dL Final   Lab Visit on 05/31/2024   Component Date Value Ref Range Status    Hepatitis B Surface Ag 05/31/2024 Non-reactive  Non-reactive Final    Hepatitis C Ab 05/31/2024 Non-reactive  Non-reactive Final    HIV  1/2 Ag/Ab 05/31/2024 Non-reactive  Non-reactive Final    Treponema Pallidum Antibodies (IgG* 05/31/2024 Nonreactive  Nonreactive Final   Office Visit on 05/31/2024   Component Date Value Ref Range Status    Final Pathologic Diagnosis 05/31/2024    Final                    Value:Specimen Adequacy  Satisfactory for interpretation. Endocervical component is present.    [place] Category  Negative for intraepithelial lesion or malignancy     Final Diagnostic Interpretation  Negative for intraepithelial lesion or malignancy. Reactive cellular changes.  Hyperkeratosis.  Inflammation and blood are present.      Disclaimer 05/31/2024    Final                    Value:The Pap smear is a screening test that aids in the detection of cervical cancer and cancer precursors. Both false positive and false negative results can occur. The test should be used at regular intervals, and positive results should be confirmed before   definitive therapy.  This liquid based specimen is processed using the  or  Thin PrepPAP System. This specimen has been analyzed by the ThinPrep Imaging System ([employer]), an automated imaging and review system which assists the laboratory in evaluating   cells on ThinPrep PAP tests. Following automated imaging, selected fields from every slide are reviewed by a cytotechnologist and/or pathologist.     Screening was performed at [facility], [address] [place] [address].      HPV other High Risk types, PCR 05/31/2024 Negative  Negative Final    HPV High Risk type 16, PCR 05/31/2024 Negative  Negative Final    HPV High Risk type 18, PCR 05/31/2024 Negative  Negative Final    Chlamydia, Amplified DNA 05/31/2024 Not Detected  Not Detected Final    N gonorrhoeae, amplified DNA 05/31/2024 Not Detected  Not Detected Final     Imaging:  Results for orders placed or performed during the hospital encounter of 07/06/22   CT Head Without  Contrast    Narrative    EXAMINATION:  CT HEAD WITHOUT CONTRAST    CLINICAL HISTORY:  Headache, sudden, severe;    TECHNIQUE:  Low dose axial images were obtained through the head.  Coronal and sagittal reformations were also performed. Contrast was not administered.    COMPARISON:  None.    FINDINGS:  The subcutaneous tissues are unremarkable.  The bony calvarium is intact.  The paranasal sinuses are unremarkable.  The mastoid air cells are clear.  The orbits and intraorbital contents are unremarkable.    The craniocervical junction is intact.  The midline structures are unremarkable.  There are no extra-axial fluid collections.  There is no evidence of intracranial hemorrhage.  The ventricles and sulci are within normal limits.  The cisterns are unremarkable.  The gray-white differentiation is maintained.  There is no dense vessel sign.  There is no evidence of mass effect.      Impression    No acute process.  Follow-up, as clinically warranted.      Electronically signed by: Adam De MD  Date:    07/06/2022  Time:    18:28     Note: I have independently reviewed any/all imaging/labs/tests and agree with the report (s) as documented.  Any discrepancies will be as noted/demarcated by free text.  JOSUÉ RODRIGUEZ 8/2/2024    ASSESSMENT:  1. Migraine with aura and without status migrainosus, not intractable            PLAN:  - Discussed symptoms appear to be consistent with migraines, discussed treatment options and patient agreed with the following plan:  - ppx - increase quilipta 60mg  - abortive - continue imitrex 100mg tab prn, try ubrelvy 50mg prn  - anxiety/depression - mgmt per psychiatrist  - abnormal movements - mgmt per neuro  - continue to track ha's  - Discussed goals of therapy are to decrease the frequency, intensity, and duration of headaches  - RTC 2-3 mo     Orders Placed This Encounter    atogepant (QULIPTA) 60 mg Tab    ubrogepant (UBRELVY) 50 mg tablet           Questions and concerns were sought  and answered to the patient's stated verbal satisfaction.  The patient verbalizes understanding and agreement with the above stated treatment plan.     CC: Teena Kothari MD Sarena Patel, PA-C  [facility]   [phone]    Dr. Doyle was available during today's encounter.

## 2024-08-15 ENCOUNTER — OFFICE VISIT (OUTPATIENT)
Dept: OBSTETRICS AND GYNECOLOGY | Facility: CLINIC | Age: 34
End: 2024-08-15
Payer: COMMERCIAL

## 2024-08-15 ENCOUNTER — HOSPITAL ENCOUNTER (OUTPATIENT)
Dept: PREADMISSION TESTING | Facility: HOSPITAL | Age: 34
Discharge: HOME | End: 2024-08-15
Attending: OBSTETRICS & GYNECOLOGY
Payer: COMMERCIAL

## 2024-08-15 ENCOUNTER — HOSPITAL ENCOUNTER (OUTPATIENT)
Dept: RADIOLOGY | Facility: HOSPITAL | Age: 34
Discharge: HOME | End: 2024-08-15
Attending: OBSTETRICS & GYNECOLOGY
Payer: COMMERCIAL

## 2024-08-15 VITALS
DIASTOLIC BLOOD PRESSURE: 88 MMHG | HEART RATE: 89 BPM | SYSTOLIC BLOOD PRESSURE: 128 MMHG | WEIGHT: 293 LBS | TEMPERATURE: 97 F | BODY MASS INDEX: 47.09 KG/M2 | OXYGEN SATURATION: 95 % | RESPIRATION RATE: 18 BRPM | HEIGHT: 66 IN

## 2024-08-15 VITALS — WEIGHT: 293 LBS | SYSTOLIC BLOOD PRESSURE: 138 MMHG | BODY MASS INDEX: 47.89 KG/M2 | DIASTOLIC BLOOD PRESSURE: 80 MMHG

## 2024-08-15 DIAGNOSIS — D25.9 UTERINE LEIOMYOMA, UNSPECIFIED LOCATION: ICD-10-CM

## 2024-08-15 DIAGNOSIS — N93.9 ABNORMAL UTERINE BLEEDING (AUB): Primary | ICD-10-CM

## 2024-08-15 DIAGNOSIS — Z01.818 PREOP TESTING: Primary | ICD-10-CM

## 2024-08-15 DIAGNOSIS — N93.9 ABNORMAL UTERINE BLEEDING (AUB): ICD-10-CM

## 2024-08-15 LAB
ALBUMIN SERPL BCP-MCNC: 3.3 G/DL (ref 3.5–5.2)
ALP SERPL-CCNC: 101 U/L (ref 55–135)
ALT SERPL W/O P-5'-P-CCNC: 11 U/L (ref 10–44)
ANION GAP SERPL CALC-SCNC: 9 MMOL/L (ref 8–16)
AST SERPL-CCNC: 15 U/L (ref 10–40)
BASOPHILS # BLD AUTO: 0.03 K/UL (ref 0–0.2)
BASOPHILS NFR BLD: 0.3 % (ref 0–1.9)
BILIRUB SERPL-MCNC: 0.3 MG/DL (ref 0.1–1)
BUN SERPL-MCNC: 12 MG/DL (ref 6–20)
CALCIUM SERPL-MCNC: 9.2 MG/DL (ref 8.7–10.5)
CHLORIDE SERPL-SCNC: 107 MMOL/L (ref 95–110)
CO2 SERPL-SCNC: 23 MMOL/L (ref 23–29)
CREAT SERPL-MCNC: 0.9 MG/DL (ref 0.5–1.4)
DIFFERENTIAL METHOD BLD: ABNORMAL
EOSINOPHIL # BLD AUTO: 0.1 K/UL (ref 0–0.5)
EOSINOPHIL NFR BLD: 0.6 % (ref 0–8)
ERYTHROCYTE [DISTWIDTH] IN BLOOD BY AUTOMATED COUNT: 14.4 % (ref 11.5–14.5)
EST. GFR  (NO RACE VARIABLE): >60 ML/MIN/1.73 M^2
GLUCOSE SERPL-MCNC: 89 MG/DL (ref 70–110)
HCT VFR BLD AUTO: 39.4 % (ref 37–48.5)
HGB BLD-MCNC: 12.4 G/DL (ref 12–16)
IMM GRANULOCYTES # BLD AUTO: 0.03 K/UL (ref 0–0.04)
IMM GRANULOCYTES NFR BLD AUTO: 0.3 % (ref 0–0.5)
LYMPHOCYTES # BLD AUTO: 2.4 K/UL (ref 1–4.8)
LYMPHOCYTES NFR BLD: 24.8 % (ref 18–48)
MCH RBC QN AUTO: 25.7 PG (ref 27–31)
MCHC RBC AUTO-ENTMCNC: 31.5 G/DL (ref 32–36)
MCV RBC AUTO: 82 FL (ref 82–98)
MONOCYTES # BLD AUTO: 0.5 K/UL (ref 0.3–1)
MONOCYTES NFR BLD: 5.2 % (ref 4–15)
NEUTROPHILS # BLD AUTO: 6.5 K/UL (ref 1.8–7.7)
NEUTROPHILS NFR BLD: 68.8 % (ref 38–73)
NRBC BLD-RTO: 0 /100 WBC
OHS QRS DURATION: 86 MS
OHS QTC CALCULATION: 434 MS
PLATELET # BLD AUTO: 447 K/UL (ref 150–450)
PMV BLD AUTO: 9.1 FL (ref 9.2–12.9)
POTASSIUM SERPL-SCNC: 4.2 MMOL/L (ref 3.5–5.1)
PROT SERPL-MCNC: 7 G/DL (ref 6–8.4)
RBC # BLD AUTO: 4.83 M/UL (ref 4–5.4)
SODIUM SERPL-SCNC: 139 MMOL/L (ref 136–145)
WBC # BLD AUTO: 9.48 K/UL (ref 3.9–12.7)

## 2024-08-15 PROCEDURE — 85025 COMPLETE CBC W/AUTO DIFF WBC: CPT | Performed by: OBSTETRICS & GYNECOLOGY

## 2024-08-15 PROCEDURE — 36415 COLL VENOUS BLD VENIPUNCTURE: CPT | Performed by: OBSTETRICS & GYNECOLOGY

## 2024-08-15 PROCEDURE — 71046 X-RAY EXAM CHEST 2 VIEWS: CPT | Mod: 26,,, | Performed by: RADIOLOGY

## 2024-08-15 PROCEDURE — 80053 COMPREHEN METABOLIC PANEL: CPT | Performed by: OBSTETRICS & GYNECOLOGY

## 2024-08-15 PROCEDURE — 71046 X-RAY EXAM CHEST 2 VIEWS: CPT | Mod: TC,FY

## 2024-08-15 PROCEDURE — 93010 ELECTROCARDIOGRAM REPORT: CPT | Mod: ,,, | Performed by: INTERNAL MEDICINE

## 2024-08-15 PROCEDURE — 93005 ELECTROCARDIOGRAM TRACING: CPT

## 2024-08-15 PROCEDURE — 99999 PR PBB SHADOW E&M-EST. PATIENT-LVL III: CPT | Mod: PBBFAC,,, | Performed by: OBSTETRICS & GYNECOLOGY

## 2024-08-15 PROCEDURE — 99499 UNLISTED E&M SERVICE: CPT | Mod: S$GLB,,, | Performed by: OBSTETRICS & GYNECOLOGY

## 2024-08-15 RX ORDER — SODIUM CHLORIDE, SODIUM LACTATE, POTASSIUM CHLORIDE, CALCIUM CHLORIDE 600; 310; 30; 20 MG/100ML; MG/100ML; MG/100ML; MG/100ML
INJECTION, SOLUTION INTRAVENOUS CONTINUOUS
OUTPATIENT
Start: 2024-08-15

## 2024-08-15 RX ORDER — FAMOTIDINE 20 MG/1
20 TABLET, FILM COATED ORAL
OUTPATIENT
Start: 2024-08-15

## 2024-08-15 RX ORDER — MUPIROCIN 20 MG/G
OINTMENT TOPICAL
OUTPATIENT
Start: 2024-08-15

## 2024-08-15 NOTE — H&P
Subjective     Patient ID: Stephan Wallace is a 34 y.o. female.    Chief Complaint:  Pre-op Exam      History of Present Illness  HPI  Patient has multiple uterine fibroids and abnormal bleeding.  Reviewed treatment option and she would like to proceed with myomectomy.  Will also do D&C during procedure.    GYN & OB History  No LMP recorded (lmp unknown).   Date of Last Pap: 2024    OB History    Para Term  AB Living   0 0 0 0 0 0   SAB IAB Ectopic Multiple Live Births   0 0 0 0 0     Past Medical History:  Past Medical History:   Diagnosis Date    Anxiety     Depression     Dissociative and conversion disorder, unspecified     Fibroids        Past Surgical History:  Past Surgical History:   Procedure Laterality Date    DENTAL IMPLANT         Family History:  Family History   Problem Relation Name Age of Onset    No Known Problems Mother      No Known Problems Father      No Known Problems Sister      No Known Problems Brother         Allergies:  Review of patient's allergies indicates:  No Known Allergies    Medications:  Current Outpatient Medications on File Prior to Visit   Medication Sig Dispense Refill    atogepant (QULIPTA) 60 mg Tab Take 1 tablet (60 mg total) by mouth once daily. 30 tablet 5    AUVELITY  mg TbIE Take  mg by mouth 2 (two) times a day.      diazePAM (VALIUM) 5 MG tablet Take 5 mg by mouth every 6 (six) hours as needed for Anxiety.      drospirenone-ethinyl estradioL (FRANCESCA) 3-0.03 mg per tablet Take 1 tablet by mouth once daily. 30 tablet 11    meloxicam (MOBIC) 7.5 MG tablet Take 1 tablet (7.5 mg total) by mouth once daily. 60 tablet 2    sumatriptan (IMITREX) 100 MG tablet TAKE 1 TAB AT FIRST SIGN OF MIGRAINE MAY REPEAT 2 HOURS LATER IF NEEDED 12 tablet 5    ubrogepant (UBRELVY) 50 mg tablet Take 1 tablet by mouth at the onset of a headache. May repeat based on response and tolerability after more than 2 hours if needed. Do not take more than 200mg in a 24  hour span. 16 tablet 5    zolpidem (AMBIEN) 10 mg Tab Take 10 mg by mouth nightly as needed.       No current facility-administered medications on file prior to visit.       Social History:  Social History     Tobacco Use    Smoking status: Never     Passive exposure: Never    Smokeless tobacco: Never   Substance Use Topics    Alcohol use: Not Currently     Alcohol/week: 7.0 standard drinks of alcohol     Types: 4 Glasses of wine, 3 Drinks containing 0.5 oz of alcohol per week    Drug use: Not Currently     Frequency: 4.0 times per week     Types: Marijuana             Review of Systems  Review of Systems   Constitutional: Negative.    HENT: Negative.     Eyes: Negative.    Respiratory: Negative.     Cardiovascular: Negative.    Gastrointestinal: Negative.    Endocrine: Negative.    Genitourinary:  Positive for dysmenorrhea, menorrhagia and menstrual problem.   Musculoskeletal: Negative.    Integumentary:  Negative.   Neurological: Negative.    Hematological: Negative.    Psychiatric/Behavioral: Negative.     Breast: negative.           Objective   Physical Exam:   Constitutional: She is oriented to person, place, and time. She appears well-developed.    HENT:   Head: Normocephalic and atraumatic.    Eyes: EOM are normal.     Cardiovascular:  Normal rate.             Pulmonary/Chest: Effort normal.        Abdominal: Soft. There is no abdominal tenderness.             Musculoskeletal: Normal range of motion.       Neurological: She is oriented to person, place, and time.    Skin: Skin is warm.    Psychiatric: She has a normal mood and affect.       Results for orders placed during the hospital encounter of 06/13/24    US Pelvis Comp with Transvag NON-OB (xpd    Narrative  EXAMINATION:  US PELVIS COMP WITH TRANSVAG NON-OB (XPD)    CLINICAL HISTORY:  Abnormal uterine and vaginal bleeding, unspecified    TECHNIQUE:  Transabdominal sonography of the pelvis was performed, followed by transvaginal sonography to better  evaluate the uterus and ovaries.    COMPARISON:  10/29/2021.    FINDINGS:  Uterus:    Size: 7.6 x 3.2 x 6.8 cm    Masses: There is a new intramural fibroid along the right aspect of the fundus of the uterus measuring 3.2 x 2.4 x 3.1 cm.  There is a subserosal exophytic fibroid measuring 6.4 x 6.5 x 6.6 cm (previous measurement of 5.1 x 4.1 x 5.0 cm).  There is a stable 1.0 x 0.8 x 1.1 cm pleural fat along the posterior aspect of the body of the uterus.    Endometrium: Normal in this pre menopausal patient, measuring 7.6 mm.    Right ovary:    Size: 2.6 x 1.9 x 2.1 cm    Appearance: Normal    Vascular flow: Normal.    Left ovary:    Size: 3.7 x 2.1 x 2.0 cm    Appearance: There is a simple right ovarian cyst measuring 1.4 x 1.2 x 1.1 cm.    Vascular Flow: Normal.    Free Fluid:    None.    Impression  New intramural fibroid along the fundus of the uterus in addition to two previously identified uterine fibroids.  Slight interval increase in the previous subserosal fibroid.    Normal thickness of the endometrial stripe.    Additional findings as above.      Electronically signed by: Adam De MD  Date:    06/14/2024  Time:    07:08              Assessment and Plan     1. Abnormal uterine bleeding (AUB)    2. Uterine leiomyoma, unspecified location             Plan:  1. Abnormal uterine bleeding (AUB)    2. Uterine leiomyoma, unspecified location  - Risks, benefits, and alternatives of Laparotomy with myomectomy and D&C reviewed with patient.  She voiced understanding.  All questions answered.  Consents are signed and on the chart.       - Place in Outpatient; Standing  - Full code; Standing  - Vital signs; Standing  - Insert peripheral IV; Standing  - Painter to Gravity; Standing  - POCT glucose; Standing  - Notify physician if BS > 180 for hysterectomy patients; Standing  - Chlorhexidine (CHG) 2% Wipes; Standing  - Notify Physician/Vital Signs Parameters Urine output less than 0.5mL/kg/hr (with indwelling catheter)  or 30 mL/hr (without indwelling catheter) or blood glucose greater than 200 mg/dL; Standing  - Notify physician; Standing  - Notify Physician - Potential Need of Opioid Reversal; Standing  - Diet NPO; Standing  - Place sequential compression device; Standing  - Chlorohexidine Gluconate Bath; Standing  - Pregnancy, urine rapid; Standing  - Type & Screen Pre Op; Standing  - Diet NPO; Standing

## 2024-08-15 NOTE — DISCHARGE INSTRUCTIONS
YOUR PROCEDURE WILL BE AT [facility] at [address] [place] [address]                  Before 7 AM, enter through the Emergency Entrance..   After 7 AM enter through the Main Entrance.                 Report to the Same Day Surgery Registration Desk in the hallway.(Just beside the Same Day Surgery Unit)      Your procedure  is scheduled for 8/20/2024__________.    Call [phone] between 2pm and 5pm on ___8/19/2024____to find out your arrival time for the day of surgery.    You may have two visitors.  No children under 12 years old.     You will be going to the Same Day Surgery Unit on the 2nd floor of the hospital.    Important instructions:  Do not eat anything after midnight.  You may have plain water, non carbonated.  You may also have Gatorade or Powerade after midnight.    Stop all fluids 2 hours before your surgery.    It is okay to brush your teeth.  Do not have gum, candy or mints.    SEE MEDICATION SHEET.   TAKE MEDICATIONS AS DIRECTED WITH SIPS OF WATER.      Do not take any diabetic medication on the morning of surgery unless instructed to do so by your doctor or pre op nurse.      All GLP-1 weekly diabetic/weight loss medications must not be taken for one week before your surgery, or your surgery could be canceled.      STOP taking Aspirin, Ibuprofen,  Advil, Motrin, Mobic(meloxicam), Aleve (naproxen), Fish oil, and Vitamin E for at least 7 days before your surgery.     You may take Tylenol if needed which is not a blood thinner.    Please shower the night before and the morning of your surgery.      Follow any Prep Instructions given by your surgeon.    Use Chlorhexidine soap as instructed by your pre op nurse.   Please place clean linens on your bed the night before surgery. Please wear fresh clean clothing after each shower.    No shaving of procedural area at least 4-5 days before surgery due to increased risk of skin irritation and/or possible  infection.    Female patients may be asked for a urine specimen on the morning of the surgery.  Please check with your nurse before using the restroom.    Contact lenses and removable denture work may not be worn during your procedure.    You may wear deodorant only. If you are having breast surgery, do not wear deodorant on the operative side.    Do not wear powder, body lotion, perfume/cologne or make-up.    Do not wear any jewelry or have any metal on your body.    You will be asked to remove any dentures or partials for the procedure.    If you are going home on the same day of surgery, you must arrange for a family member or a friend to drive you home.  Public transportation is prohibited.  You will not be able to drive home if you were given anesthesia or sedation.    Patients who want to have their Post-op prescriptions filled from our in-house [facility], bring a Credit/Debit Card or cash with you. A co-pay may be required.  The pharmacy closes at 5:30 pm.    Wear loose fitting clothes allowing for bandages.    Please leave money and valuables home.      You may bring your cell phone.    Call the doctor if fever or illness should occur before your surgery.    Call [phone] to contact us here if needed.                            CLOTHES ON DAY OF SURGERY    SHOULDER surgery:  you must have a very oversized shirt.  Very, Very large.  You will probably have a large sling on with your arm strapped to your chest.  You will not be able to put the arm of the operated shoulder into a sleeve.  You can put the arm of the un-operated shoulder into the sleeve, but the shirt will need to be draped over the operated shoulder.       ARM or HAND surgery:  make sure that your sleeves are large and loose enough to pass over large dressings or cast.      BREAST or UNDERARM surgery:  wear a loose, button down shirt so that you can dress without raising your arms over your head.    ABDOMINAL surgery:  wear loose,  comfortable clothing.  Nothing tight around the abdomen.  NO JEANS    PENIS or SCROTAL surgery:  loose comfortable clothing.  Large sweat pants, pajama pants or a robe.  ABSOLUTELY NO JEANS      LEG or FOOT surgery:  wear large loose pants that are able to pass over any large dressings or casts.  You could also wear loose shorts or a skirt.

## 2024-08-15 NOTE — ANESTHESIA PREPROCEDURE EVALUATION
08/15/2024  Stephan Wallace is a 34 y.o., female scheduled for  LAPAROTOMY, EXPLORATORY, WITH UTERINE MYOMECTOMY (Abdomen)       DILATION AND CURETTAGE, UTERUS (Vagina) on 8/20/2024.      Past Medical History:   Diagnosis Date    Anxiety     Depression     Dissociative and conversion disorder, unspecified     Fibroids          Past Surgical History:   Procedure Laterality Date    DENTAL IMPLANT               Pre-op Assessment    I have reviewed the Patient Summary Reports.     I have reviewed the Nursing Notes. I have reviewed the NPO Status.   I have reviewed the Medications.     Review of Systems  Anesthesia Hx:  No previous Anesthesia             Denies Family Hx of Anesthesia complications.     Social:  Non-Smoker, Social Alcohol Use       Hematology/Oncology:  Hematology Normal   Oncology Normal                                   EENT/Dental:  EENT/Dental Normal           Cardiovascular:  Exercise tolerance: good                   Functional Capacity good / => 4 METS                         Pulmonary:  Pulmonary Normal                       Renal/:  Renal/ Normal                 Hepatic/GI:  Hepatic/GI Normal                 Musculoskeletal:  Musculoskeletal Normal                Neurological:      Headaches                                 Endocrine:        Morbid Obesity / BMI > 40  Dermatological:  Skin Normal        Physical Exam  General: Well nourished    Airway:  Mallampati: II   Mouth Opening: Normal  Tongue: Normal  Neck ROM: Normal ROM    Dental:  Intact    Chest/Lungs:  Clear to auscultation    Heart:  Rate: Normal  Rhythm: Regular Rhythm  Sounds: Normal      Lab Results   Component Value Date    WBC 9.48 08/15/2024    HGB 12.4 08/15/2024    HCT 39.4 08/15/2024    MCV 82 08/15/2024     08/15/2024         Chemistry        Component Value Date/Time     08/15/2024 1025    K 4.2  08/15/2024 1025     08/15/2024 1025    CO2 23 08/15/2024 1025    BUN 12 08/15/2024 1025    CREATININE 0.9 08/15/2024 1025    GLU 89 08/15/2024 1025        Component Value Date/Time    CALCIUM 9.2 08/15/2024 1025    ALKPHOS 101 08/15/2024 1025    AST 15 08/15/2024 1025    ALT 11 08/15/2024 1025    BILITOT 0.3 08/15/2024 1025    ESTGFRAFRICA >105 03/15/2022 2047            Anesthesia Plan  Type of Anesthesia, risks & benefits discussed:    Anesthesia Type: Gen ETT  Intra-op Monitoring Plan: Standard ASA Monitors  Post Op Pain Control Plan: multimodal analgesia  Induction:  IV  Informed Consent: Informed consent signed with the Patient and all parties understand the risks and agree with anesthesia plan.  All questions answered. Patient consented to blood products? Yes  ASA Score: 3    Ready For Surgery From Anesthesia Perspective.     .

## 2024-08-16 ENCOUNTER — TELEPHONE (OUTPATIENT)
Dept: OBSTETRICS AND GYNECOLOGY | Facility: CLINIC | Age: 34
End: 2024-08-16
Payer: COMMERCIAL

## 2024-08-20 ENCOUNTER — ANESTHESIA (OUTPATIENT)
Dept: SURGERY | Facility: HOSPITAL | Age: 34
End: 2024-08-20
Payer: COMMERCIAL

## 2024-08-20 ENCOUNTER — HOSPITAL ENCOUNTER (OUTPATIENT)
Facility: HOSPITAL | Age: 34
Discharge: HOME | End: 2024-08-21
Attending: OBSTETRICS & GYNECOLOGY | Admitting: OBSTETRICS & GYNECOLOGY
Payer: COMMERCIAL

## 2024-08-20 DIAGNOSIS — Z98.890 S/P MYOMECTOMY: Primary | ICD-10-CM

## 2024-08-20 DIAGNOSIS — Z01.818 PREOP TESTING: ICD-10-CM

## 2024-08-20 DIAGNOSIS — D25.9 UTERINE LEIOMYOMA, UNSPECIFIED LOCATION: ICD-10-CM

## 2024-08-20 DIAGNOSIS — N93.9 ABNORMAL UTERINE BLEEDING (AUB): ICD-10-CM

## 2024-08-20 LAB
ABO + RH BLD: NORMAL
B-HCG UR QL: NEGATIVE
BLD GP AB SCN CELLS X3 SERPL QL: NORMAL
CTP QC/QA: YES
POCT GLUCOSE: 103 MG/DL (ref 70–110)
SPECIMEN OUTDATE: NORMAL

## 2024-08-20 PROCEDURE — 58120 DILATION AND CURETTAGE: CPT | Mod: 51,,, | Performed by: OBSTETRICS & GYNECOLOGY

## 2024-08-20 PROCEDURE — 81025 URINE PREGNANCY TEST: CPT | Performed by: OBSTETRICS & GYNECOLOGY

## 2024-08-20 PROCEDURE — 58140 MYOMECTOMY ABDOM METHOD: CPT | Mod: ,,, | Performed by: OBSTETRICS & GYNECOLOGY

## 2024-08-20 PROCEDURE — 86901 BLOOD TYPING SEROLOGIC RH(D): CPT | Performed by: OBSTETRICS & GYNECOLOGY

## 2024-08-20 PROCEDURE — 25000003 PHARM REV CODE 250: Performed by: OBSTETRICS & GYNECOLOGY

## 2024-08-20 PROCEDURE — 86900 BLOOD TYPING SEROLOGIC ABO: CPT | Performed by: OBSTETRICS & GYNECOLOGY

## 2024-08-20 PROCEDURE — 63600175 PHARM REV CODE 636 W HCPCS: Performed by: STUDENT IN AN ORGANIZED HEALTH CARE EDUCATION/TRAINING PROGRAM

## 2024-08-20 PROCEDURE — 63600175 PHARM REV CODE 636 W HCPCS: Performed by: ANESTHESIOLOGY

## 2024-08-20 PROCEDURE — 51702 INSERT TEMP BLADDER CATH: CPT

## 2024-08-20 PROCEDURE — 36000707: Performed by: OBSTETRICS & GYNECOLOGY

## 2024-08-20 PROCEDURE — 63600175 PHARM REV CODE 636 W HCPCS: Performed by: OBSTETRICS & GYNECOLOGY

## 2024-08-20 PROCEDURE — 36415 COLL VENOUS BLD VENIPUNCTURE: CPT | Performed by: OBSTETRICS & GYNECOLOGY

## 2024-08-20 PROCEDURE — 71000033 HC RECOVERY, INTIAL HOUR: Performed by: OBSTETRICS & GYNECOLOGY

## 2024-08-20 PROCEDURE — 25000003 PHARM REV CODE 250: Performed by: ANESTHESIOLOGY

## 2024-08-20 PROCEDURE — 37000008 HC ANESTHESIA 1ST 15 MINUTES: Performed by: OBSTETRICS & GYNECOLOGY

## 2024-08-20 PROCEDURE — 82962 GLUCOSE BLOOD TEST: CPT | Performed by: OBSTETRICS & GYNECOLOGY

## 2024-08-20 PROCEDURE — 25000003 PHARM REV CODE 250: Performed by: STUDENT IN AN ORGANIZED HEALTH CARE EDUCATION/TRAINING PROGRAM

## 2024-08-20 PROCEDURE — 88305 TISSUE EXAM BY PATHOLOGIST: CPT | Performed by: PATHOLOGY

## 2024-08-20 PROCEDURE — 86850 RBC ANTIBODY SCREEN: CPT | Performed by: OBSTETRICS & GYNECOLOGY

## 2024-08-20 PROCEDURE — 36000706: Performed by: OBSTETRICS & GYNECOLOGY

## 2024-08-20 PROCEDURE — C1765 ADHESION BARRIER: HCPCS | Performed by: OBSTETRICS & GYNECOLOGY

## 2024-08-20 PROCEDURE — 37000009 HC ANESTHESIA EA ADD 15 MINS: Performed by: OBSTETRICS & GYNECOLOGY

## 2024-08-20 DEVICE — ABSORBABLE ADHESION BARRIER
Type: IMPLANTABLE DEVICE | Site: ABDOMEN | Status: FUNCTIONAL
Brand: GYNECARE INTERCEED

## 2024-08-20 RX ORDER — KETOROLAC TROMETHAMINE 30 MG/ML
30 INJECTION, SOLUTION INTRAMUSCULAR; INTRAVENOUS ONCE
Status: COMPLETED | OUTPATIENT
Start: 2024-08-20 | End: 2024-08-20

## 2024-08-20 RX ORDER — SODIUM CHLORIDE, SODIUM LACTATE, POTASSIUM CHLORIDE, CALCIUM CHLORIDE 600; 310; 30; 20 MG/100ML; MG/100ML; MG/100ML; MG/100ML
INJECTION, SOLUTION INTRAVENOUS CONTINUOUS
Status: DISCONTINUED | OUTPATIENT
Start: 2024-08-20 | End: 2024-08-20

## 2024-08-20 RX ORDER — OXYCODONE HYDROCHLORIDE 5 MG/1
5 TABLET ORAL EVERY 4 HOURS PRN
Status: DISCONTINUED | OUTPATIENT
Start: 2024-08-20 | End: 2024-08-21 | Stop reason: HOSPADM

## 2024-08-20 RX ORDER — VASOPRESSIN 20 [USP'U]/ML
INJECTION, SOLUTION INTRAMUSCULAR; SUBCUTANEOUS
Status: DISCONTINUED | OUTPATIENT
Start: 2024-08-20 | End: 2024-08-20 | Stop reason: HOSPADM

## 2024-08-20 RX ORDER — ACETAMINOPHEN 500 MG
1000 TABLET ORAL EVERY 6 HOURS
Status: DISCONTINUED | OUTPATIENT
Start: 2024-08-20 | End: 2024-08-21 | Stop reason: HOSPADM

## 2024-08-20 RX ORDER — DIPHENHYDRAMINE HCL 25 MG
25 CAPSULE ORAL EVERY 4 HOURS PRN
Status: DISCONTINUED | OUTPATIENT
Start: 2024-08-20 | End: 2024-08-21 | Stop reason: HOSPADM

## 2024-08-20 RX ORDER — MUPIROCIN 20 MG/G
OINTMENT TOPICAL 2 TIMES DAILY
Status: DISCONTINUED | OUTPATIENT
Start: 2024-08-20 | End: 2024-08-21 | Stop reason: HOSPADM

## 2024-08-20 RX ORDER — ACETAMINOPHEN 500 MG
1000 TABLET ORAL
Status: COMPLETED | OUTPATIENT
Start: 2024-08-20 | End: 2024-08-20

## 2024-08-20 RX ORDER — OXYCODONE HYDROCHLORIDE 5 MG/1
10 TABLET ORAL EVERY 4 HOURS PRN
Status: DISCONTINUED | OUTPATIENT
Start: 2024-08-20 | End: 2024-08-21 | Stop reason: HOSPADM

## 2024-08-20 RX ORDER — DIPHENHYDRAMINE HYDROCHLORIDE 50 MG/ML
25 INJECTION INTRAMUSCULAR; INTRAVENOUS EVERY 4 HOURS PRN
Status: DISCONTINUED | OUTPATIENT
Start: 2024-08-20 | End: 2024-08-21 | Stop reason: HOSPADM

## 2024-08-20 RX ORDER — KETOROLAC TROMETHAMINE 30 MG/ML
INJECTION, SOLUTION INTRAMUSCULAR; INTRAVENOUS
Status: DISCONTINUED | OUTPATIENT
Start: 2024-08-20 | End: 2024-08-20

## 2024-08-20 RX ORDER — PROPOFOL 10 MG/ML
VIAL (ML) INTRAVENOUS
Status: DISCONTINUED | OUTPATIENT
Start: 2024-08-20 | End: 2024-08-20

## 2024-08-20 RX ORDER — SODIUM CHLORIDE 0.9 % (FLUSH) 0.9 %
10 SYRINGE (ML) INJECTION
Status: DISCONTINUED | OUTPATIENT
Start: 2024-08-20 | End: 2024-08-20 | Stop reason: HOSPADM

## 2024-08-20 RX ORDER — IBUPROFEN 400 MG/1
800 TABLET ORAL EVERY 8 HOURS
Status: DISCONTINUED | OUTPATIENT
Start: 2024-08-21 | End: 2024-08-21 | Stop reason: HOSPADM

## 2024-08-20 RX ORDER — SUMATRIPTAN SUCCINATE 25 MG/1
100 TABLET ORAL
Status: DISCONTINUED | OUTPATIENT
Start: 2024-08-20 | End: 2024-08-21 | Stop reason: HOSPADM

## 2024-08-20 RX ORDER — HYDROMORPHONE HYDROCHLORIDE 1 MG/ML
1 INJECTION, SOLUTION INTRAMUSCULAR; INTRAVENOUS; SUBCUTANEOUS EVERY 6 HOURS PRN
Status: DISCONTINUED | OUTPATIENT
Start: 2024-08-20 | End: 2024-08-21 | Stop reason: HOSPADM

## 2024-08-20 RX ORDER — MIDAZOLAM HYDROCHLORIDE 1 MG/ML
INJECTION INTRAMUSCULAR; INTRAVENOUS
Status: DISCONTINUED | OUTPATIENT
Start: 2024-08-20 | End: 2024-08-20

## 2024-08-20 RX ORDER — KETAMINE HYDROCHLORIDE 100 MG/ML
INJECTION, SOLUTION INTRAMUSCULAR; INTRAVENOUS
Status: DISCONTINUED | OUTPATIENT
Start: 2024-08-20 | End: 2024-08-20

## 2024-08-20 RX ORDER — SCOLOPAMINE TRANSDERMAL SYSTEM 1 MG/1
PATCH, EXTENDED RELEASE TRANSDERMAL
Status: DISCONTINUED | OUTPATIENT
Start: 2024-08-20 | End: 2024-08-20

## 2024-08-20 RX ORDER — FAMOTIDINE 20 MG/1
20 TABLET, FILM COATED ORAL
Status: COMPLETED | OUTPATIENT
Start: 2024-08-20 | End: 2024-08-20

## 2024-08-20 RX ORDER — KETOROLAC TROMETHAMINE 30 MG/ML
30 INJECTION, SOLUTION INTRAMUSCULAR; INTRAVENOUS EVERY 8 HOURS
Status: DISCONTINUED | OUTPATIENT
Start: 2024-08-20 | End: 2024-08-21 | Stop reason: HOSPADM

## 2024-08-20 RX ORDER — ZOLPIDEM TARTRATE 5 MG/1
10 TABLET ORAL NIGHTLY PRN
Status: DISCONTINUED | OUTPATIENT
Start: 2024-08-20 | End: 2024-08-21 | Stop reason: HOSPADM

## 2024-08-20 RX ORDER — DEXMEDETOMIDINE HYDROCHLORIDE 100 UG/ML
INJECTION, SOLUTION INTRAVENOUS
Status: DISCONTINUED | OUTPATIENT
Start: 2024-08-20 | End: 2024-08-20

## 2024-08-20 RX ORDER — HYDROMORPHONE HYDROCHLORIDE 2 MG/ML
0.2 INJECTION, SOLUTION INTRAMUSCULAR; INTRAVENOUS; SUBCUTANEOUS EVERY 5 MIN PRN
Status: DISCONTINUED | OUTPATIENT
Start: 2024-08-20 | End: 2024-08-20 | Stop reason: HOSPADM

## 2024-08-20 RX ORDER — GLUCAGON 1 MG
1 KIT INJECTION
Status: DISCONTINUED | OUTPATIENT
Start: 2024-08-20 | End: 2024-08-20 | Stop reason: HOSPADM

## 2024-08-20 RX ORDER — DEXAMETHASONE SODIUM PHOSPHATE 4 MG/ML
INJECTION, SOLUTION INTRA-ARTICULAR; INTRALESIONAL; INTRAMUSCULAR; INTRAVENOUS; SOFT TISSUE
Status: DISCONTINUED | OUTPATIENT
Start: 2024-08-20 | End: 2024-08-20

## 2024-08-20 RX ORDER — ONDANSETRON 8 MG/1
8 TABLET, ORALLY DISINTEGRATING ORAL EVERY 8 HOURS PRN
Status: DISCONTINUED | OUTPATIENT
Start: 2024-08-20 | End: 2024-08-21 | Stop reason: HOSPADM

## 2024-08-20 RX ORDER — PHENYLEPHRINE HYDROCHLORIDE 10 MG/ML
INJECTION INTRAVENOUS
Status: DISCONTINUED | OUTPATIENT
Start: 2024-08-20 | End: 2024-08-20

## 2024-08-20 RX ORDER — ROCURONIUM BROMIDE 10 MG/ML
INJECTION, SOLUTION INTRAVENOUS
Status: DISCONTINUED | OUTPATIENT
Start: 2024-08-20 | End: 2024-08-20

## 2024-08-20 RX ORDER — BISACODYL 10 MG/1
10 SUPPOSITORY RECTAL DAILY PRN
Status: DISCONTINUED | OUTPATIENT
Start: 2024-08-20 | End: 2024-08-21 | Stop reason: HOSPADM

## 2024-08-20 RX ORDER — ONDANSETRON HYDROCHLORIDE 2 MG/ML
INJECTION, SOLUTION INTRAVENOUS
Status: DISCONTINUED | OUTPATIENT
Start: 2024-08-20 | End: 2024-08-20

## 2024-08-20 RX ORDER — FENTANYL CITRATE 50 UG/ML
INJECTION, SOLUTION INTRAMUSCULAR; INTRAVENOUS
Status: DISCONTINUED | OUTPATIENT
Start: 2024-08-20 | End: 2024-08-20

## 2024-08-20 RX ORDER — DOCUSATE SODIUM 100 MG/1
100 CAPSULE, LIQUID FILLED ORAL 2 TIMES DAILY
Status: DISCONTINUED | OUTPATIENT
Start: 2024-08-20 | End: 2024-08-21 | Stop reason: HOSPADM

## 2024-08-20 RX ORDER — DIAZEPAM 5 MG/1
5 TABLET ORAL EVERY 6 HOURS PRN
Status: DISCONTINUED | OUTPATIENT
Start: 2024-08-20 | End: 2024-08-21 | Stop reason: HOSPADM

## 2024-08-20 RX ORDER — LIDOCAINE HYDROCHLORIDE 20 MG/ML
INJECTION INTRAVENOUS
Status: DISCONTINUED | OUTPATIENT
Start: 2024-08-20 | End: 2024-08-20

## 2024-08-20 RX ORDER — LIDOCAINE HYDROCHLORIDE 10 MG/ML
1 INJECTION, SOLUTION EPIDURAL; INFILTRATION; INTRACAUDAL; PERINEURAL ONCE
Status: DISCONTINUED | OUTPATIENT
Start: 2024-08-20 | End: 2024-08-20 | Stop reason: HOSPADM

## 2024-08-20 RX ORDER — MUPIROCIN 20 MG/G
OINTMENT TOPICAL
Status: DISCONTINUED | OUTPATIENT
Start: 2024-08-20 | End: 2024-08-20 | Stop reason: HOSPADM

## 2024-08-20 RX ADMIN — DEXAMETHASONE SODIUM PHOSPHATE 4 MG: 4 INJECTION, SOLUTION INTRAMUSCULAR; INTRAVENOUS at 10:08

## 2024-08-20 RX ADMIN — HYDROMORPHONE HYDROCHLORIDE 0.2 MG: 2 INJECTION INTRAMUSCULAR; INTRAVENOUS; SUBCUTANEOUS at 11:08

## 2024-08-20 RX ADMIN — FENTANYL CITRATE 100 MCG: 50 INJECTION, SOLUTION INTRAMUSCULAR; INTRAVENOUS at 10:08

## 2024-08-20 RX ADMIN — SODIUM CHLORIDE, POTASSIUM CHLORIDE, SODIUM LACTATE AND CALCIUM CHLORIDE: 600; 310; 30; 20 INJECTION, SOLUTION INTRAVENOUS at 11:08

## 2024-08-20 RX ADMIN — OXYCODONE 10 MG: 5 TABLET ORAL at 01:08

## 2024-08-20 RX ADMIN — ACETAMINOPHEN 1000 MG: 500 TABLET ORAL at 08:08

## 2024-08-20 RX ADMIN — HYDROMORPHONE HYDROCHLORIDE 0.2 MG: 2 INJECTION INTRAMUSCULAR; INTRAVENOUS; SUBCUTANEOUS at 12:08

## 2024-08-20 RX ADMIN — KETOROLAC TROMETHAMINE 30 MG: 30 INJECTION, SOLUTION INTRAMUSCULAR at 09:08

## 2024-08-20 RX ADMIN — PROPOFOL 200 MG: 10 INJECTION, EMULSION INTRAVENOUS at 10:08

## 2024-08-20 RX ADMIN — DEXMEDETOMIDINE HYDROCHLORIDE 4 MCG: 100 INJECTION, SOLUTION INTRAVENOUS at 11:08

## 2024-08-20 RX ADMIN — FAMOTIDINE 20 MG: 20 TABLET ORAL at 08:08

## 2024-08-20 RX ADMIN — KETOROLAC TROMETHAMINE 30 MG: 30 INJECTION, SOLUTION INTRAMUSCULAR at 12:08

## 2024-08-20 RX ADMIN — OXYCODONE 10 MG: 5 TABLET ORAL at 06:08

## 2024-08-20 RX ADMIN — DEXMEDETOMIDINE HYDROCHLORIDE 12 MCG: 100 INJECTION, SOLUTION INTRAVENOUS at 10:08

## 2024-08-20 RX ADMIN — MUPIROCIN: 20 OINTMENT TOPICAL at 09:08

## 2024-08-20 RX ADMIN — ONDANSETRON 4 MG: 2 INJECTION, SOLUTION INTRAMUSCULAR; INTRAVENOUS at 10:08

## 2024-08-20 RX ADMIN — MIDAZOLAM HYDROCHLORIDE 2 MG: 1 INJECTION INTRAMUSCULAR; INTRAVENOUS at 09:08

## 2024-08-20 RX ADMIN — KETAMINE HYDROCHLORIDE 40 MG: 100 INJECTION, SOLUTION, CONCENTRATE INTRAMUSCULAR; INTRAVENOUS at 10:08

## 2024-08-20 RX ADMIN — PHENYLEPHRINE HYDROCHLORIDE 100 MCG: 10 INJECTION INTRAVENOUS at 10:08

## 2024-08-20 RX ADMIN — SUGAMMADEX 400 MG: 100 INJECTION, SOLUTION INTRAVENOUS at 11:08

## 2024-08-20 RX ADMIN — LIDOCAINE HYDROCHLORIDE 100 MG: 20 INJECTION, SOLUTION INTRAVENOUS at 10:08

## 2024-08-20 RX ADMIN — DOCUSATE SODIUM 100 MG: 100 CAPSULE, LIQUID FILLED ORAL at 09:08

## 2024-08-20 RX ADMIN — KETOROLAC TROMETHAMINE 30 MG: 30 INJECTION, SOLUTION INTRAMUSCULAR at 10:08

## 2024-08-20 RX ADMIN — SODIUM CHLORIDE, POTASSIUM CHLORIDE, SODIUM LACTATE AND CALCIUM CHLORIDE: 600; 310; 30; 20 INJECTION, SOLUTION INTRAVENOUS at 08:08

## 2024-08-20 RX ADMIN — SCOPALAMINE 1 PATCH: 1 PATCH, EXTENDED RELEASE TRANSDERMAL at 09:08

## 2024-08-20 RX ADMIN — SUMATRIPTAN SUCCINATE 100 MG: 25 TABLET ORAL at 10:08

## 2024-08-20 RX ADMIN — DEXTROSE MONOHYDRATE 3 G: 5 INJECTION INTRAVENOUS at 10:08

## 2024-08-20 RX ADMIN — MUPIROCIN: 20 OINTMENT TOPICAL at 08:08

## 2024-08-20 RX ADMIN — ACETAMINOPHEN 1000 MG: 500 TABLET ORAL at 11:08

## 2024-08-20 RX ADMIN — DEXMEDETOMIDINE HYDROCHLORIDE 8 MCG: 100 INJECTION, SOLUTION INTRAVENOUS at 11:08

## 2024-08-20 RX ADMIN — ROCURONIUM BROMIDE 100 MG: 10 INJECTION INTRAVENOUS at 10:08

## 2024-08-20 RX ADMIN — ACETAMINOPHEN 1000 MG: 500 TABLET ORAL at 05:08

## 2024-08-20 RX ADMIN — METHOCARBAMOL 1000 MG: 100 INJECTION INTRAMUSCULAR; INTRAVENOUS at 11:08

## 2024-08-20 NOTE — NURSING
assessment done. Patient Vital signs normals. light vaginal bleeding noted on taylor-pad. Patient has island dressing on with some drainage noted and outlined. Lung sounds are clear and patient instructed on the use of IS machine  about achieved 2500 x5 times.Painter catheter placed per MD orders.Patient instructed on medications and plan of care reviewed. Patient verbalized understanding.

## 2024-08-20 NOTE — OP NOTE
[facility]  OBGYN  Operative Note    SUMMARY     Date of Procedure: 8/20/2024     Procedure: Procedure(s) (LRB):  LAPAROTOMY, EXPLORATORY, WITH UTERINE MYOMECTOMY (N/A)  DILATION AND CURETTAGE, UTERUS (N/A)       Surgeons and Role:     * Molly Vega MD - Primary     * Halima Go-Rylan Romeo MD - Assisting        Pre-Operative Diagnosis: Abnormal uterine bleeding (AUB) [N93.9]  Uterine leiomyoma, unspecified location [D25.9]    Post-Operative Diagnosis: Post-Op Diagnosis Codes:     * Abnormal uterine bleeding (AUB) [N93.9]     * Uterine leiomyoma, unspecified location [D25.9]    Anesthesia: General    Technical Procedures Used:   Procedure in Detail:  The patient was taken to the operating room where anesthesia was obtained without difficulty.  The patient was placed in the dorsal lithotomy position and prepped and draped in a normal sterile fashion.  A time-out was performed.    A charles catheter was placed and the bladder was emptied.      A speculum was inserted into the vagina.  The cervix was visualized and grasped with a tenaculum for retraction.  The uterus was sounded to 10 cm.  Using a curette,  sharp curettage of the endometrium was then performed.  The specimen was sent for pathologic diagnosis.    All instruments were removed from the vagina.      The patient was taken out of [facility] and the abdomen was prepped and draped in a normal sterile fashion.    A Pfannenstiel incision was made with the knife and taken down to the level of the rectus fascia with the knife.  The fascia was incised and the incision was extended laterally with curved Israel Scissors. The fascia was  from the underlying rectus muscle using blunt dissection and curved Israel Scissors. The peritoneal cavity was entered bluntly and the incision was extended bluntly and with the Bovie cautery.     The uterus was delivered through the incision.  A large 5 cm fundal fibroid incision was noted.  The serosa was incised with the  Bovie cautery.  Using the Bovie cautery and blunt dissection, the fibroid was excised in it's entirety.  The fibroid was sent for pathologic diagnosis.  The excess serosa was removed using the Bovie cautery.    The incision was closed in 2 layers using #1 Vicryl suture.  The first and second layers were running and locking.      Inspection of the pelvic cavity revealed grossly normal tubes and ovaries.    Hemostasis of the myomectomy site was noted.      The abdomen was thoroughly irrigated and suctioned of all irrigation and blood clots.  The uterus was returned to the abdominal cavity. Hemostasis of the myomectomy site was again noted.      Intercede was placed over the myomectomy site to prevent adhesions.     The muscle was re-approximated with 2-0 chromic suture in a horizontal mattress fashion. The fascia was closed with 0 Vicryl suture.    The subcutaneous tissue was re-approximately with 2-0 plain gut.  The skin was closed with 4-0 Monocryl in a subcuticular fashion.    Sponge, lap and needle counts were correct x 2.    The patient was taken to the recovery room in stable condition.     Description of the Findings of the Procedure:   Uterus sounded to 10 cm  - Large fundal fibroid measuring 5 cm    Complications: No    Estimated Blood Loss (EBL): 50 mL           Implants: * No implants in log *    Specimens:   Specimen (24h ago, onward)       Start     Ordered    08/20/24 1052  Specimen to Pathology, Surgery Gynecology and Obstetrics  Once        Comments: Pre-op Diagnosis: Abnormal uterine bleeding (AUB) [N93.9]Uterine leiomyoma, unspecified location [D25.9]Procedure(s):LAPAROTOMY, EXPLORATORY, WITH UTERINE MYOMECTOMYDILATION AND CURETTAGE, UTERUS Number of specimens: 2Name of specimens: 1. Endometrial Curretings2. Uterine Fibroids     References:    Click here for ordering Quick Tip   Question Answer Comment   Procedure Type: Gynecology and Obstetrics    Release to patient Immediate        08/20/24 1786                             Condition: Good    Disposition: PACU - hemodynamically stable.    Attestation: I was present and scrubbed for the entire procedure.

## 2024-08-20 NOTE — ANESTHESIA PROCEDURE NOTES
Intubation    Date/Time: 8/20/2024 10:10 AM    Performed by: Piotr Flowers CRNA  Authorized by: Anamaria Starks MD    Intubation:     Induction:  Intravenous    Intubated:  Postinduction    Mask Ventilation:  Moderately difficult with oral airway (2 person BMV)    Attempts:  1    Attempted By:  MATILDA and student (ROSEMARY Mayer)    Method of Intubation:  Video laryngoscopy    Blade:  Romero 3    Laryngeal View Grade: Grade I - full view of cords      Difficult Airway Encountered?: No      Complications:  None    Airway Device:  Oral endotracheal tube    Airway Device Size:  7.0    Style/Cuff Inflation:  Cuffed (inflated to minimal occlusive pressure)    Tube secured:  22    Secured at:  The lips    Placement Verified By:  Capnometry    Complicating Factors:  None    Findings Post-Intubation:  BS equal bilateral and atraumatic/condition of teeth unchanged

## 2024-08-20 NOTE — TRANSFER OF CARE
Anesthesia Transfer of Care Note    Patient: Stephan Wallace    Procedure(s) Performed: Procedure(s) (LRB):  LAPAROTOMY, EXPLORATORY, WITH UTERINE MYOMECTOMY (N/A)  DILATION AND CURETTAGE, UTERUS (N/A)    Patient location: PACU    Anesthesia Type: general    Transport from OR: Transported from OR on 6-10 L/min O2 by face mask with adequate spontaneous ventilation    Post pain: adequate analgesia    Post assessment: no apparent anesthetic complications and tolerated procedure well    Post vital signs: stable    Level of consciousness: responds to stimulation and lethargic    Nausea/Vomiting: no nausea/vomiting    Complications: none    Transfer of care protocol was followed      Last vitals: Visit Vitals  /67   Pulse 95   Temp 36.1 °C (97 °F)   Resp (!) 8   Wt 134.3 kg (296 lb 1.6 oz)   LMP 08/10/2024 (Exact Date)   SpO2 95%   Breastfeeding No   BMI 47.79 kg/m²

## 2024-08-20 NOTE — BRIEF OP NOTE
[facility] - Surgery  OBGYN  Brief Operative Note    SUMMARY     Date of Procedure: 8/20/2024     Procedure: Procedure(s) (LRB):  LAPAROTOMY, EXPLORATORY, WITH UTERINE MYOMECTOMY (N/A)  DILATION AND CURETTAGE, UTERUS (N/A)       Surgeons and Role:     * Molly Vega MD - Primary     * Halima Go-Rylan Romeo MD - Assisting        Pre-Operative Diagnosis: Abnormal uterine bleeding (AUB) [N93.9]  Uterine leiomyoma, unspecified location [D25.9]    Post-Operative Diagnosis: Post-Op Diagnosis Codes:     * Abnormal uterine bleeding (AUB) [N93.9]     * Uterine leiomyoma, unspecified location [D25.9]    Anesthesia: General    Description of the Findings of the Procedure:   Uterus sounded to 10 cm  - Large fundal fibroid measuring 5 cm    Complications: No    Estimated Blood Loss (EBL): 50 mL           Implants: * No implants in log *    Specimens:   Specimen (24h ago, onward)       Start     Ordered    08/20/24 1052  Specimen to Pathology, Surgery Gynecology and Obstetrics  Once        Comments: Pre-op Diagnosis: Abnormal uterine bleeding (AUB) [N93.9]Uterine leiomyoma, unspecified location [D25.9]Procedure(s):LAPAROTOMY, EXPLORATORY, WITH UTERINE MYOMECTOMYDILATION AND CURETTAGE, UTERUS Number of specimens: 2Name of specimens: 1. Endometrial Curretings2. Uterine Fibroids     References:    Click here for ordering Quick Tip   Question Answer Comment   Procedure Type: Gynecology and Obstetrics    Release to patient Immediate        08/20/24 1105                            Condition: Good    Disposition: PACU - hemodynamically stable.    Attestation: I was present and scrubbed for the entire procedure.

## 2024-08-21 ENCOUNTER — PATIENT MESSAGE (OUTPATIENT)
Dept: OBSTETRICS AND GYNECOLOGY | Facility: CLINIC | Age: 34
End: 2024-08-21
Payer: COMMERCIAL

## 2024-08-21 VITALS
HEART RATE: 69 BPM | DIASTOLIC BLOOD PRESSURE: 63 MMHG | OXYGEN SATURATION: 96 % | RESPIRATION RATE: 20 BRPM | TEMPERATURE: 100 F | SYSTOLIC BLOOD PRESSURE: 117 MMHG | HEIGHT: 66 IN | WEIGHT: 293 LBS | BODY MASS INDEX: 47.09 KG/M2

## 2024-08-21 LAB
BASOPHILS # BLD AUTO: 0.01 K/UL (ref 0–0.2)
BASOPHILS NFR BLD: 0.1 % (ref 0–1.9)
DIFFERENTIAL METHOD BLD: ABNORMAL
EOSINOPHIL # BLD AUTO: 0 K/UL (ref 0–0.5)
EOSINOPHIL NFR BLD: 0.1 % (ref 0–8)
ERYTHROCYTE [DISTWIDTH] IN BLOOD BY AUTOMATED COUNT: 14.3 % (ref 11.5–14.5)
FINAL PATHOLOGIC DIAGNOSIS: NORMAL
GROSS: NORMAL
HCT VFR BLD AUTO: 37.9 % (ref 37–48.5)
HGB BLD-MCNC: 11.3 G/DL (ref 12–16)
IMM GRANULOCYTES # BLD AUTO: 0.05 K/UL (ref 0–0.04)
IMM GRANULOCYTES NFR BLD AUTO: 0.4 % (ref 0–0.5)
LYMPHOCYTES # BLD AUTO: 1.7 K/UL (ref 1–4.8)
LYMPHOCYTES NFR BLD: 13.2 % (ref 18–48)
Lab: NORMAL
MCH RBC QN AUTO: 25 PG (ref 27–31)
MCHC RBC AUTO-ENTMCNC: 29.8 G/DL (ref 32–36)
MCV RBC AUTO: 84 FL (ref 82–98)
MONOCYTES # BLD AUTO: 0.7 K/UL (ref 0.3–1)
MONOCYTES NFR BLD: 5.4 % (ref 4–15)
NEUTROPHILS # BLD AUTO: 10.1 K/UL (ref 1.8–7.7)
NEUTROPHILS NFR BLD: 80.8 % (ref 38–73)
NRBC BLD-RTO: 0 /100 WBC
PLATELET # BLD AUTO: 356 K/UL (ref 150–450)
PMV BLD AUTO: 9.7 FL (ref 9.2–12.9)
RBC # BLD AUTO: 4.52 M/UL (ref 4–5.4)
WBC # BLD AUTO: 12.49 K/UL (ref 3.9–12.7)

## 2024-08-21 PROCEDURE — 36415 COLL VENOUS BLD VENIPUNCTURE: CPT | Performed by: OBSTETRICS & GYNECOLOGY

## 2024-08-21 PROCEDURE — 63600175 PHARM REV CODE 636 W HCPCS: Performed by: OBSTETRICS & GYNECOLOGY

## 2024-08-21 PROCEDURE — 25000003 PHARM REV CODE 250: Performed by: OBSTETRICS & GYNECOLOGY

## 2024-08-21 PROCEDURE — 85025 COMPLETE CBC W/AUTO DIFF WBC: CPT | Performed by: OBSTETRICS & GYNECOLOGY

## 2024-08-21 RX ORDER — DOCUSATE SODIUM 100 MG/1
100 CAPSULE, LIQUID FILLED ORAL 2 TIMES DAILY
Qty: 60 CAPSULE | Refills: 1 | Status: SHIPPED | OUTPATIENT
Start: 2024-08-21 | End: 2025-08-21

## 2024-08-21 RX ORDER — ACETAMINOPHEN 500 MG
1000 TABLET ORAL EVERY 6 HOURS PRN
Qty: 100 TABLET | Refills: 2 | Status: SHIPPED | OUTPATIENT
Start: 2024-08-21 | End: 2025-08-21

## 2024-08-21 RX ORDER — IBUPROFEN 800 MG/1
800 TABLET ORAL EVERY 8 HOURS PRN
Qty: 60 TABLET | Refills: 2 | Status: SHIPPED | OUTPATIENT
Start: 2024-08-21 | End: 2025-08-21

## 2024-08-21 RX ORDER — OXYCODONE HYDROCHLORIDE 10 MG/1
10 TABLET ORAL EVERY 4 HOURS PRN
Qty: 30 TABLET | Refills: 0 | Status: SHIPPED | OUTPATIENT
Start: 2024-08-21

## 2024-08-21 RX ADMIN — ACETAMINOPHEN 1000 MG: 500 TABLET ORAL at 11:08

## 2024-08-21 RX ADMIN — OXYCODONE 10 MG: 5 TABLET ORAL at 08:08

## 2024-08-21 RX ADMIN — DOCUSATE SODIUM 100 MG: 100 CAPSULE, LIQUID FILLED ORAL at 08:08

## 2024-08-21 RX ADMIN — MUPIROCIN: 20 OINTMENT TOPICAL at 08:08

## 2024-08-21 RX ADMIN — ACETAMINOPHEN 1000 MG: 500 TABLET ORAL at 05:08

## 2024-08-21 RX ADMIN — KETOROLAC TROMETHAMINE 30 MG: 30 INJECTION, SOLUTION INTRAMUSCULAR at 05:08

## 2024-08-21 NOTE — NURSING
Patient discharged per order. VS stable with no signs of distress. Discharge instructions reviewed with patient.  Patient instructed on signs and symptoms of infections when to go to  ER or call physician if symptoms occur. Instructed on follow-up appointment with physician. Patient voiced understanding of all.

## 2024-08-21 NOTE — NURSING
Morning assessment done. Patient denies any dizziness or sob . Patient Vital signs normals. Scant vaginal bleeding noted pad. Patient instructed on medications and plan of care reviewed. Patient verbalized understanding.

## 2024-08-21 NOTE — ANESTHESIA POSTPROCEDURE EVALUATION
Anesthesia Post Evaluation    Patient: Stephan Wallace    Procedure(s) Performed: Procedure(s) (LRB):  LAPAROTOMY, EXPLORATORY, WITH UTERINE MYOMECTOMY (N/A)  DILATION AND CURETTAGE, UTERUS (N/A)    Final Anesthesia Type: general      Patient location during evaluation: PACU  Patient participation: Yes- Able to Participate  Level of consciousness: awake and alert, oriented and awake  Post-procedure vital signs: reviewed and stable  Airway patency: patent    PONV status at discharge: No PONV  Anesthetic complications: no      Cardiovascular status: blood pressure returned to baseline  Respiratory status: unassisted, spontaneous ventilation and room air  Hydration status: euvolemic  Follow-up not needed.              Vitals Value Taken Time   /61 08/21/24 0735   Temp 36.5 °C (97.7 °F) 08/21/24 0735   Pulse 71 08/21/24 0735   Resp 18 08/21/24 0830   SpO2 99 % 08/21/24 0735         Event Time   Out of Recovery 12:22:00         Pain/Dipti Score: Pain Rating Prior to Med Admin: 7 (8/21/2024  8:30 AM)  Pain Rating Post Med Admin: 3 (8/21/2024  6:28 AM)  Dipti Score: 10 (8/21/2024  7:30 AM)

## 2024-08-21 NOTE — DISCHARGE SUMMARY
[facility]  Obstetrics & Gynecology  Discharge Summary    Patient Name: Stephan Wallace  MRN: [id number]  Admission Date: 8/20/2024  Hospital Length of Stay: 0 days  Discharge Date and Time:  08/21/2024 11:43 AM  Attending Physician: Molly Vega MD   Discharging Provider: Molly Vega MD  Primary Care Provider: Teena Kothari MD    HPI:  Patient admitted for scheduled D&C and myomectomy.    Hospital Course:  8/21/2024 Postoperative day # 1.  Doing well. Pain well controlled.   Ambulating and voiding without difficulty.  Tolerating a regular diet without nausea or vomiting.  Stable for discharge home.       Goals of Care Treatment Preferences:  Code Status: Full Code      Procedure(s) (LRB):  LAPAROTOMY, EXPLORATORY, WITH UTERINE MYOMECTOMY (N/A)  DILATION AND CURETTAGE, UTERUS (N/A)         Significant Diagnostic Studies: Labs: CBC   Recent Labs   Lab 08/21/24  0606   WBC 12.49   HGB 11.3*   HCT 37.9            Pending Diagnostic Studies:       Procedure Component Value Units Date/Time    Specimen to Pathology, Surgery Gynecology and Obstetrics [[id number]] Collected: 08/20/24 1105    Order Status: Sent Lab Status: In process Updated: 08/20/24 1300    Specimen: Tissue           Final Active Diagnoses:    Diagnosis Date Noted POA    PRINCIPAL PROBLEM:  S/P myomectomy [Z98.890] 08/20/2024 Not Applicable    Abnormal uterine bleeding (AUB) [N93.9] 08/20/2024 Yes    Uterine leiomyoma [D25.9]  Yes      Problems Resolved During this Admission:        Discharged Condition: good    Disposition: Home or Self Care    Follow Up:    Patient Instructions:      Diet Adult Regular     Lifting restrictions     No driving until:   Order Comments: No longer taking narcotics     Pelvic Rest     No dressing needed     Leave dressing on - Keep it clean, dry, and intact until clinic visit     Notify your health care provider if you experience any of the following:  temperature >100.4     Notify your health  care provider if you experience any of the following:  persistent nausea and vomiting or diarrhea     Notify your health care provider if you experience any of the following:  severe uncontrolled pain     Notify your health care provider if you experience any of the following:  redness, tenderness, or signs of infection (pain, swelling, redness, odor or green/yellow discharge around incision site)     Notify your health care provider if you experience any of the following:  severe persistent headache     Notify your health care provider if you experience any of the following:  worsening rash     Notify your health care provider if you experience any of the following:  persistent dizziness, light-headedness, or visual disturbances     Notify your health care provider if you experience any of the following:  increased confusion or weakness     Medications:  Reconciled Home Medications:      Medication List        START taking these medications      acetaminophen 500 MG tablet  Commonly known as: TYLENOL EXTRA STRENGTH  Take 2 tablets (1,000 mg total) by mouth every 6 (six) hours as needed for Pain.     docusate sodium 100 MG capsule  Commonly known as: COLACE  Take 1 capsule (100 mg total) by mouth 2 (two) times daily.     ibuprofen 800 MG tablet  Commonly known as: ADVIL,MOTRIN  Take 1 tablet (800 mg total) by mouth every 8 (eight) hours as needed for Pain.     oxyCODONE 10 mg Tab immediate release tablet  Commonly known as: ROXICODONE  Take 1 tablet (10 mg total) by mouth every 4 (four) hours as needed for Pain.            CONTINUE taking these medications      AUVELITY  mg Tbie  Generic drug: dextromethorphan-bupropion  Take  mg by mouth 2 (two) times a day.     diazePAM 5 MG tablet  Commonly known as: VALIUM  Take 5 mg by mouth every 6 (six) hours as needed for Anxiety.     drospirenone-ethinyl estradioL 3-0.03 mg per tablet  Commonly known as: FRANCESCA  Take 1 tablet by mouth once daily.     meloxicam  7.5 MG tablet  Commonly known as: MOBIC  Take 1 tablet (7.5 mg total) by mouth once daily.     QULIPTA 60 mg Tab  Generic drug: atogepant  Take 1 tablet (60 mg total) by mouth once daily.     sumatriptan 100 MG tablet  Commonly known as: IMITREX  TAKE 1 TAB AT FIRST SIGN OF MIGRAINE MAY REPEAT 2 HOURS LATER IF NEEDED     UBRELVY 50 mg tablet  Generic drug: ubrogepant  Take 1 tablet by mouth at the onset of a headache. May repeat based on response and tolerability after more than 2 hours if needed. Do not take more than 200mg in a 24 hour span.     zolpidem 10 mg Tab  Commonly known as: AMBIEN  Take 10 mg by mouth nightly as needed.              Molly Vega MD  Obstetrics & Gynecology  [facility]

## 2024-08-21 NOTE — HOSPITAL COURSE
8/21/2024 Postoperative day # 1.  Doing well. Pain well controlled.   Ambulating and voiding without difficulty.  Tolerating a regular diet without nausea or vomiting.  Stable for discharge home.

## 2024-08-21 NOTE — PLAN OF CARE
Problem: Adult Inpatient Plan of Care  Goal: Plan of Care Review  Outcome: Progressing  Goal: Patient-Specific Goal (Individualized)  Outcome: Progressing  Goal: Absence of Hospital-Acquired Illness or Injury  Outcome: Progressing  Goal: Optimal Comfort and Wellbeing  Outcome: Progressing  Goal: Readiness for Transition of Care  Outcome: Progressing     Problem: Bariatric Environmental Safety  Goal: Safety Maintained with Care  Outcome: Progressing     Problem: Infection  Goal: Absence of Infection Signs and Symptoms  Outcome: Progressing     Problem: Wound  Goal: Optimal Coping  Outcome: Progressing  Goal: Optimal Functional Ability  Outcome: Progressing  Goal: Absence of Infection Signs and Symptoms  Outcome: Progressing  Goal: Improved Oral Intake  Outcome: Progressing  Goal: Optimal Pain Control and Function  Outcome: Progressing  Goal: Skin Health and Integrity  Outcome: Progressing  Goal: Optimal Wound Healing  Outcome: Progressing     
Pt stable, VS WNL. Painter d/c @0600.  Output adequate.  Pain managed with toradol, tylenol 1000, and sumatriptan.  Abdominal dressing has moderate amount of marked dried drainage.  IV SL.  Tolerating regular diet.  Pt doing well.   
6

## 2024-08-26 ENCOUNTER — TELEPHONE (OUTPATIENT)
Dept: OBSTETRICS AND GYNECOLOGY | Facility: CLINIC | Age: 34
End: 2024-08-26
Payer: COMMERCIAL

## 2024-08-26 NOTE — TELEPHONE ENCOUNTER
----- Message from Chasidy Duron sent at 8/26/2024  2:04 PM CDT -----  Regarding: call back request  Name of caller:lawanda       What is the requesting detail: experiencing bleeding. Passing big clots. Requesting a call back to discuss. Please advise       Can the clinic reply by [facility]:       What number to call back:[phone]

## 2024-08-26 NOTE — TELEPHONE ENCOUNTER
Spoke with pt post surgy pt states that she has been passing clots, denies any dizziness or sob please advise

## 2024-08-28 ENCOUNTER — OFFICE VISIT (OUTPATIENT)
Dept: OBSTETRICS AND GYNECOLOGY | Facility: CLINIC | Age: 34
End: 2024-08-28
Payer: COMMERCIAL

## 2024-08-28 VITALS — WEIGHT: 293 LBS | SYSTOLIC BLOOD PRESSURE: 128 MMHG | BODY MASS INDEX: 48.61 KG/M2 | DIASTOLIC BLOOD PRESSURE: 80 MMHG

## 2024-08-28 DIAGNOSIS — Z98.890 S/P MYOMECTOMY: Primary | ICD-10-CM

## 2024-08-28 PROCEDURE — 99024 POSTOP FOLLOW-UP VISIT: CPT | Mod: S$GLB,,, | Performed by: OBSTETRICS & GYNECOLOGY

## 2024-08-28 PROCEDURE — 99999 PR PBB SHADOW E&M-EST. PATIENT-LVL III: CPT | Mod: PBBFAC,,, | Performed by: OBSTETRICS & GYNECOLOGY

## 2024-08-28 NOTE — PROGRESS NOTES
Subjective     Patient ID: Stephan Wallace is a 34 y.o. female.    Chief Complaint:  Post-op Evaluation      History of Present Illness  HPI  Postoperative Follow-up  Patient presents to the clinic 1 weeks status post  exploratory laparotomy with myomectomy  for fibroids. Eating a regular diet without difficulty. Bowel movements are normal. Pain is controlled with current analgesics. Medications being used: acetaminophen, ibuprofen (OTC), and narcotic analgesics including oxycodone (Oxycontin, Oxyir).    GYN & OB History  Patient's last menstrual period was 08/10/2024 (exact date).   Date of Last Pap: 2024    OB History    Para Term  AB Living   0 0 0 0 0 0   SAB IAB Ectopic Multiple Live Births   0 0 0 0 0       Review of Systems  Review of Systems   Constitutional: Negative.    HENT: Negative.     Eyes: Negative.    Respiratory: Negative.     Cardiovascular: Negative.    Gastrointestinal: Negative.    Endocrine: Negative.    Genitourinary: Negative.    Musculoskeletal: Negative.    Integumentary:  Negative.   Neurological: Negative.    Hematological: Negative.    Psychiatric/Behavioral: Negative.     Breast: negative.           Objective   Physical Exam:   Constitutional: She is oriented to person, place, and time. She appears well-developed.    HENT:   Head: Normocephalic and atraumatic.    Eyes: EOM are normal.     Cardiovascular:  Normal rate.             Pulmonary/Chest: Effort normal.        Abdominal: Soft. She exhibits no distension and no mass. There is no abdominal tenderness.             Musculoskeletal: Normal range of motion.       Neurological: She is oriented to person, place, and time.    Skin: Skin is warm. Lesion (Pfannensteil incision is healing well) noted.    Psychiatric: She has a normal mood and affect.            Assessment and Plan     1. S/P myomectomy             Plan:  - Continue pelvic rest.  - follow up in 4 wks.

## 2024-09-03 ENCOUNTER — PATIENT MESSAGE (OUTPATIENT)
Dept: NEUROLOGY | Facility: CLINIC | Age: 34
End: 2024-09-03
Payer: COMMERCIAL

## 2024-09-05 ENCOUNTER — PATIENT MESSAGE (OUTPATIENT)
Dept: OBSTETRICS AND GYNECOLOGY | Facility: CLINIC | Age: 34
End: 2024-09-05
Payer: COMMERCIAL

## 2024-09-05 DIAGNOSIS — Z98.890 S/P MYOMECTOMY: Primary | ICD-10-CM

## 2024-09-06 RX ORDER — NALOXONE HYDROCHLORIDE 4 MG/.1ML
SPRAY NASAL
Qty: 1 EACH | Refills: 11 | Status: SHIPPED | OUTPATIENT
Start: 2024-09-06

## 2024-09-06 RX ORDER — OXYCODONE HYDROCHLORIDE 10 MG/1
10 TABLET ORAL EVERY 4 HOURS PRN
Qty: 15 TABLET | Refills: 0 | Status: SHIPPED | OUTPATIENT
Start: 2024-09-06

## 2024-09-19 ENCOUNTER — OFFICE VISIT (OUTPATIENT)
Dept: OBSTETRICS AND GYNECOLOGY | Facility: CLINIC | Age: 34
End: 2024-09-19
Payer: COMMERCIAL

## 2024-09-19 ENCOUNTER — PATIENT MESSAGE (OUTPATIENT)
Dept: PRIMARY CARE CLINIC | Facility: CLINIC | Age: 34
End: 2024-09-19
Payer: COMMERCIAL

## 2024-09-19 VITALS
WEIGHT: 289.88 LBS | BODY MASS INDEX: 46.79 KG/M2 | DIASTOLIC BLOOD PRESSURE: 80 MMHG | SYSTOLIC BLOOD PRESSURE: 134 MMHG

## 2024-09-19 DIAGNOSIS — Z98.890 S/P MYOMECTOMY: Primary | ICD-10-CM

## 2024-09-19 PROCEDURE — 99024 POSTOP FOLLOW-UP VISIT: CPT | Mod: S$GLB,,, | Performed by: OBSTETRICS & GYNECOLOGY

## 2024-09-19 PROCEDURE — 99999 PR PBB SHADOW E&M-EST. PATIENT-LVL II: CPT | Mod: PBBFAC,,, | Performed by: OBSTETRICS & GYNECOLOGY

## 2024-09-19 NOTE — PROGRESS NOTES
Subjective     Patient ID: Stephan Wallace is a 34 y.o. female.    Chief Complaint:  Post-op Evaluation      History of Present Illness  HPI  Postoperative Follow-up  Patient presents to the clinic 4 weeks status post  exploratory laparotomy with myomectomy  for fibroids. Eating a regular diet without difficulty. Bowel movements are normal. Pain is controlled without any medications.    GYN & OB History  No LMP recorded.   Date of Last Pap: 2024    OB History    Para Term  AB Living   0 0 0 0 0 0   SAB IAB Ectopic Multiple Live Births   0 0 0 0 0       Review of Systems  Review of Systems   Constitutional: Negative.    HENT: Negative.     Eyes: Negative.    Respiratory: Negative.     Cardiovascular: Negative.    Gastrointestinal: Negative.    Endocrine: Negative.    Genitourinary: Negative.    Musculoskeletal: Negative.    Integumentary:  Negative.   Neurological: Negative.    Hematological: Negative.    Psychiatric/Behavioral: Negative.     Breast: negative.           Objective   Physical Exam:   Constitutional: She is oriented to person, place, and time. She appears well-developed.    HENT:   Head: Normocephalic and atraumatic.    Eyes: EOM are normal.     Cardiovascular:  Normal rate.             Pulmonary/Chest: Effort normal.        Abdominal: Soft. She exhibits no distension and no mass. There is no abdominal tenderness.             Musculoskeletal: Normal range of motion.       Neurological: She is oriented to person, place, and time.    Skin: Skin is warm. Lesion (Pfannensteil incision is healing well) noted.    Psychiatric: She has a normal mood and affect.            Assessment and Plan     1. S/P myomectomy             Plan:  - well recovered.    - follow up prn or for annual exam.

## 2024-09-21 ENCOUNTER — PATIENT MESSAGE (OUTPATIENT)
Dept: OBSTETRICS AND GYNECOLOGY | Facility: CLINIC | Age: 34
End: 2024-09-21
Payer: COMMERCIAL

## 2024-11-13 NOTE — PROGRESS NOTES
Established Patient     The patient location is: [place]  The chief complaint leading to consultation is: ha f/up    Visit type: audiovisual    Face to Face time with patient: 6 min  10 minutes of total time spent on the encounter, which includes face to face time and non-face to face time preparing to see the patient (eg, review of tests), Obtaining and/or reviewing separately obtained history, Documenting clinical information in the electronic or other health record, Independently interpreting results (not separately reported) and communicating results to the patient/family/caregiver, or Care coordination (not separately reported).         Each patient to whom he or she provides medical services by telemedicine is:  (1) informed of the relationship between the physician and patient and the respective role of any other health care provider with respect to management of the patient; and (2) notified that he or she may decline to receive medical services by telemedicine and may withdraw from such care at any time.    Notes:     SUBJECTIVE:  Patient ID: Stephan Wallace   Chief Complaint: Headache    History of Present Illness:  Stephan Wallace is a 34 y.o. female with migraines, depression, anxiety who presents to [facility] for follow-up of headaches.       11/18/2024 - Interval History:  Ha's have improved. Per HA diary, are now occurring 8/30 days per month. Has been unable to obtain ubrelvy 2/2 to [place]. Is taking imitrex as needed.   Plan: continue quilipta 60mg/d, try ubrelvy 50mg prn, continue imitrex tabs prn, continue to track ha's, rtc 6-12 mo or sooner if needed    08/02/2024 - Interval History:  Ha's have improved since starting quilipta, per HA diary they are occurring 13-16/30 days per month. Insurance requires scripts sent to [facility]. Was unable to receive the Imitrex NS.   Ubrelvy 50mg - cannot recall trying it  Imitrex 100mg- helps  Quilipta 30mg- helps  Plan: increase quilipta 60mg/d, try ubrelvy 50mg prn,  continue imitrex tabs prn, continue to track ha's, rtc 2-3 mo or sooner if needed    4/12/24 Telephone Encounter:   Aimovig is complete plan/benefit exclusion. Qulipta is alternative.   Called pt and informed her, she is willing to try qulipta.   Started quilipta 30mg.     4/10/24 Telephone Encounter:   Called pt.   Insurance will not cover nurtec unless she has tried aimovig and ubrelvy.   Insurance will not cover ubrelvy unless she has tried 2 triptans. Has tried imitrex thus far.   Pt has been informed. She would like to try aimovig at this time.     04/05/2024 - Interval History:  Pt not currently tracking. Is unsure of frequency. Did have 2 HA's last week lasting 2 days each. Had 6 HA's in the last month lasting days each. Discussed options w/ pt. Including preventatives and abortives and previous insurance responses (nurtec not approved until tried aimovig and ubrelvy first). Pt would like to try ubrelvy and track ha's.   Plan: try ubrelvy 50mg prn, continue imitrex tabs prn, track ha's, rtc 2-3 mo or sooner if needed    12/11/2023 - Interval History:  Pt has not been taking emgality for many months. Is unsure when she stopped taking it. Of note, they were very painful for her, she would like to avoid injectable options if possible. Ha's are occurring approximately 8/30 days per month. Pt is tracking, will send diary via [employer].   Nurtec - hasn't tried yet  Imitrex NS - hasn't tried yet  Imitrex tabs - help better than anything else taken so far  Plan: try nurtec preventatively, continue imitrex tabs prn, track ha's, rtc 2-3 mo or sooner if needed    11/13/2023 - Interval History:  Pt's ha's worsened in the last month, denies any changes at the time. Ha's occurring 17/30 days this month. Imitrex is helping, but is temporary.   Discussed otpions. Would like to try nurtec instead of emgality 2/2 painful when injecting.   Plan: try nurtec preventatively (if insurance denies, is amenable to injectable cgrp  inhibitor), continue imitrex tabs prn, will try imitrex NS as 2nd line, rtc 2-3 mo or sooner if needed    06/20/2023 - Interval History:  Pt completed 3 rounds of emgality after last visit which helped greatly. She stopped taking it approximately 3 months ago. Ha's have been well controlled since last visit, occurring 1-2 times a month. Resolvign w/ imitrex.   Continues to follow w/ neurology regarding abnormal movements.   Plan: continue imitrex prn, continue neuro recs, rtc prn    Recommendations made at last Office Visit on 1/25/23:  - Discussed symptoms appear to be consistent with migraines, discussed treatment options and patient agreed with the following plan:  - ppx - start emgality  - abortive - continue imitrex 100mg tab prn  - anxiety/depression - continue cymbalta 60mg/d, mgmt per psychiatrist  - risks, benefits, and potential side effects of emgality, imitrex discussed   - alternative treatment options offered   - importance of healthy diet, regular exercise and sleep hygiene in the treatment of headaches    - Start tracking headaches via [facility] [facility] on phone   - RTC in 4 mo or sooner if needed     Treatments Tried:  Emgality - completed 3 rounds, helped,  painful  Tpx - face tingling  Cymbalta 60mg/d - helps depression/anxiety  Quilipta 60mg - helps  Imitrex 100mg - helps  Ubrelvy 50mg - pending PA  Excedrin  Advil     Current Medications:    Current Outpatient Medications:     acetaminophen (TYLENOL EXTRA STRENGTH) 500 MG tablet, Take 2 tablets (1,000 mg total) by mouth every 6 (six) hours as needed for Pain., Disp: 100 tablet, Rfl: 2    atogepant (QULIPTA) 60 mg Tab, Take 1 tablet (60 mg total) by mouth once daily., Disp: 30 tablet, Rfl: 5    AUVELITY  mg TbIE, Take  mg by mouth 2 (two) times a day., Disp: , Rfl:     diazePAM (VALIUM) 5 MG tablet, Take 5 mg by mouth every 6 (six) hours as needed for Anxiety., Disp: , Rfl:     docusate sodium (COLACE) 100 MG capsule, Take 1 capsule  (100 mg total) by mouth 2 (two) times daily., Disp: 60 capsule, Rfl: 1    drospirenone-ethinyl estradioL (FRANCESCA) 3-0.03 mg per tablet, Take 1 tablet by mouth once daily., Disp: 30 tablet, Rfl: 11    ibuprofen (ADVIL,MOTRIN) 800 MG tablet, Take 1 tablet (800 mg total) by mouth every 8 (eight) hours as needed for Pain., Disp: 60 tablet, Rfl: 2    meloxicam (MOBIC) 7.5 MG tablet, Take 1 tablet (7.5 mg total) by mouth once daily., Disp: 60 tablet, Rfl: 2    naloxone (NARCAN) 4 mg/actuation Spry, 4mg by nasal route as needed for opioid overdose; may repeat every 2-3 minutes in alternating nostrils until medical help arrives. Call 911, Disp: 1 each, Rfl: 11    oxyCODONE (ROXICODONE) 10 mg Tab immediate release tablet, Take 1 tablet (10 mg total) by mouth every 4 (four) hours as needed for Pain., Disp: 15 tablet, Rfl: 0    sumatriptan (IMITREX) 100 MG tablet, Take 1 tab PO at onset of migraine, can repeat in 2 hrs if needed.  No more than twice per day or 3 days/wk., Disp: 18 tablet, Rfl: 5    ubrogepant (UBRELVY) 50 mg tablet, Take 1 tablet by mouth at the onset of a headache. May repeat based on response and tolerability after more than 2 hours if needed. Do not take more than 200mg in a 24 hour span., Disp: 16 tablet, Rfl: 5    zolpidem (AMBIEN) 10 mg Tab, Take 10 mg by mouth nightly as needed., Disp: , Rfl:     Review of Systems - as per HPI, otherwise a balanced 10 systems review is negative.    OBJECTIVE:  Vitals:  There were no vitals taken for this visit.     Physical Exam:  Constitutional: she appears well-developed and well-nourished. she is well groomed. NAD   HENT:    Head: Normocephalic and atraumatic  Eyes: Conjunctivae and EOM are normal  Musculoskeletal: Normal range of motion. No joint stiffness.   Skin: Skin is warm and dry.  Psychiatric: Mood and affect are normal    Neuro: Patient is alert and oriented to person, place, and time. Language is intact and fluent. Speech is clear and fluent. Recent and  remote memory are intact.  Normal attention and concentration.  Facial movement is symmetric. Moves all 4 extremities against gravity. Gait and station normal.  Cranial Nerves II through XII without focal deficit.     Review of Data:   Notes from neuro reviewed   Labs:  Admission on 08/26/2024, Discharged on 08/27/2024   Component Date Value Ref Range Status    WBC 08/26/2024 10.21  3.90 - 12.70 K/uL Final    RBC 08/26/2024 5.05  4.00 - 5.40 M/uL Final    Hemoglobin 08/26/2024 12.7  12.0 - 16.0 g/dL Final    Hematocrit 08/26/2024 41.3  37.0 - 48.5 % Final    MCV 08/26/2024 82  82 - 98 fL Final    MCH 08/26/2024 25.1 (L)  27.0 - 31.0 pg Final    MCHC 08/26/2024 30.8 (L)  32.0 - 36.0 g/dL Final    RDW 08/26/2024 14.3  11.5 - 14.5 % Final    Platelets 08/26/2024 389  150 - 450 K/uL Final    MPV 08/26/2024 9.1 (L)  9.2 - 12.9 fL Final    Immature Granulocytes 08/26/2024 0.3  0.0 - 0.5 % Final    Gran # (ANC) 08/26/2024 6.9  1.8 - 7.7 K/uL Final    Immature Grans (Abs) 08/26/2024 0.03  0.00 - 0.04 K/uL Final    Lymph # 08/26/2024 2.6  1.0 - 4.8 K/uL Final    Mono # 08/26/2024 0.4  0.3 - 1.0 K/uL Final    Eos # 08/26/2024 0.2  0.0 - 0.5 K/uL Final    Baso # 08/26/2024 0.04  0.00 - 0.20 K/uL Final    nRBC 08/26/2024 0  0 /100 WBC Final    Gran % 08/26/2024 67.8  38.0 - 73.0 % Final    Lymph % 08/26/2024 25.7  18.0 - 48.0 % Final    Mono % 08/26/2024 4.0  4.0 - 15.0 % Final    Eosinophil % 08/26/2024 1.8  0.0 - 8.0 % Final    Basophil % 08/26/2024 0.4  0.0 - 1.9 % Final    Differential Method 08/26/2024 Automated   Final    Sodium 08/26/2024 140  136 - 145 mmol/L Final    Potassium 08/26/2024 4.1  3.5 - 5.1 mmol/L Final    Chloride 08/26/2024 104  95 - 110 mmol/L Final    CO2 08/26/2024 24  23 - 29 mmol/L Final    Glucose 08/26/2024 87  70 - 110 mg/dL Final    BUN 08/26/2024 10  6 - 20 mg/dL Final    Creatinine 08/26/2024 0.8  0.5 - 1.4 mg/dL Final    Calcium 08/26/2024 9.4  8.7 - 10.5 mg/dL Final    Total Protein  08/26/2024 7.7  6.0 - 8.4 g/dL Final    Albumin 08/26/2024 3.4 (L)  3.5 - 5.2 g/dL Final    Total Bilirubin 08/26/2024 0.2  0.1 - 1.0 mg/dL Final    Alkaline Phosphatase 08/26/2024 92  55 - 135 U/L Final    AST 08/26/2024 17  10 - 40 U/L Final    ALT 08/26/2024 16  10 - 44 U/L Final    eGFR 08/26/2024 >60.0  >60 mL/min/1.73 m^2 Final    Anion Gap 08/26/2024 12  8 - 16 mmol/L Final    Specimen UA 08/26/2024 Urine, Clean Catch   Final    Color, UA 08/26/2024 Colorless (A)  Yellow, Straw, Nikky Final    Appearance, UA 08/26/2024 Hazy (A)  Clear Final    pH, UA 08/26/2024 6.0  5.0 - 8.0 Final    Specific Gravity, UA 08/26/2024 1.020  1.005 - 1.030 Final    Protein, UA 08/26/2024 Negative  Negative Final    Glucose, UA 08/26/2024 Negative  Negative Final    Ketones, UA 08/26/2024 Negative  Negative Final    Bilirubin (UA) 08/26/2024 Negative  Negative Final    Occult Blood UA 08/26/2024 3+ (A)  Negative Final    Nitrite, UA 08/26/2024 Negative  Negative Final    Urobilinogen, UA 08/26/2024 Negative  Negative EU/dL Final    Leukocytes, UA 08/26/2024 1+ (A)  Negative Final    POC Preg Test, Ur 08/26/2024 Negative  Negative Final     Acceptable 08/26/2024 Yes   Final    RBC, UA 08/26/2024 >100 (H)  0 - 4 /hpf Final    WBC, UA 08/26/2024 20 (H)  0 - 5 /hpf Final    Squam Epithel, UA 08/26/2024 22  /hpf Final    Microscopic Comment 08/26/2024 SEE COMMENT   Final    Urine Culture, Routine 08/26/2024  (A)   Final                    Value:ESCHERICHIA COLI  10,000 - 49,999 cfu/ml     Admission on 08/20/2024, Discharged on 08/21/2024   Component Date Value Ref Range Status    Group & Rh 08/20/2024 A POS   Final    Indirect [place] 08/20/2024 NEG   Final    Specimen Outdate 08/20/2024 08/23/2024 23:59   Final    POC Preg Test, Ur 08/20/2024 Negative  Negative Final     Acceptable 08/20/2024 Yes   Final    POCT Glucose 08/20/2024 103  70 - 110 mg/dL Final    Final Pathologic Diagnosis 08/20/2024    Final                     Value:1. Endometrium, biopsy:  Endometrial polyp in a background of proliferative endometrium  Superficial strips of endocervical and ectocervical epithelium with no diagnostic histopathologic alterations  Negative for hyperplasia, atypia, dysplasia, and malignancy    2. Uterus, fibroids, myomectomy:   Fragments of benign smooth muscle, consistent with leiomyomata       Gross 08/20/2024    Final                    Value:1: Container Label: Surgery MRN:  [id number] and Pathology MRN:  [id number]  Received in formalin labeled &quot;endometrial curettage&quot;  is  a 1.5 x 1.2 x 0.2 cm aggregate of multiple brown tan soft tissue fragments and mucoid material on a Telfa pad. The specimen is collected, filtered and wrapped. Entirely submitted in   cassette AXA--1A.   2: Container Label: Surgery MRN:  [id number] and Pathology MRN:  [id number]  Received in formalin labeled &quot;uterine fibroids&quot;  and consist of a 7.0 x 5.5 x 4.8 cm myoma.  Also identified in container are 2 red-tan uterine tissue fragments measuring 3.8 x 2.1 x 1.4 cm and 3.8 x 1.6 x 1.4 cm.  The total weight of specimen   is 128 g.  Serially sectioned and representative sections are submitted in 2 cassettes [id number].  Teodoro Hurley      Disclaimer 08/20/2024 Unless the case is a 'gross only' or additional testing only, the final diagnosis for each specimen is based on a microscopic examination of appropriate tissue sections.   Final    WBC 08/21/2024 12.49  3.90 - 12.70 K/uL Final    RBC 08/21/2024 4.52  4.00 - 5.40 M/uL Final    Hemoglobin 08/21/2024 11.3 (L)  12.0 - 16.0 g/dL Final    Hematocrit 08/21/2024 37.9  37.0 - 48.5 % Final    MCV 08/21/2024 84  82 - 98 fL Final    MCH 08/21/2024 25.0 (L)  27.0 - 31.0 pg Final    MCHC 08/21/2024 29.8 (L)  32.0 - 36.0 g/dL Final    RDW 08/21/2024 14.3  11.5 - 14.5 % Final    Platelets 08/21/2024 356  150 - 450 K/uL Final    MPV 08/21/2024 9.7  9.2 - 12.9 fL Final    Immature  Granulocytes 08/21/2024 0.4  0.0 - 0.5 % Final    Gran # (ANC) 08/21/2024 10.1 (H)  1.8 - 7.7 K/uL Final    Immature Grans (Abs) 08/21/2024 0.05 (H)  0.00 - 0.04 K/uL Final    Lymph # 08/21/2024 1.7  1.0 - 4.8 K/uL Final    Mono # 08/21/2024 0.7  0.3 - 1.0 K/uL Final    Eos # 08/21/2024 0.0  0.0 - 0.5 K/uL Final    Baso # 08/21/2024 0.01  0.00 - 0.20 K/uL Final    nRBC 08/21/2024 0  0 /100 WBC Final    Gran % 08/21/2024 80.8 (H)  38.0 - 73.0 % Final    Lymph % 08/21/2024 13.2 (L)  18.0 - 48.0 % Final    Mono % 08/21/2024 5.4  4.0 - 15.0 % Final    Eosinophil % 08/21/2024 0.1  0.0 - 8.0 % Final    Basophil % 08/21/2024 0.1  0.0 - 1.9 % Final    Differential Method 08/21/2024 Automated   Final     Imaging:  Results for orders placed or performed during the hospital encounter of 07/06/22   CT Head Without Contrast    Narrative    EXAMINATION:  CT HEAD WITHOUT CONTRAST    CLINICAL HISTORY:  Headache, sudden, severe;    TECHNIQUE:  Low dose axial images were obtained through the head.  Coronal and sagittal reformations were also performed. Contrast was not administered.    COMPARISON:  None.    FINDINGS:  The subcutaneous tissues are unremarkable.  The bony calvarium is intact.  The paranasal sinuses are unremarkable.  The mastoid air cells are clear.  The orbits and intraorbital contents are unremarkable.    The craniocervical junction is intact.  The midline structures are unremarkable.  There are no extra-axial fluid collections.  There is no evidence of intracranial hemorrhage.  The ventricles and sulci are within normal limits.  The cisterns are unremarkable.  The gray-white differentiation is maintained.  There is no dense vessel sign.  There is no evidence of mass effect.      Impression    No acute process.  Follow-up, as clinically warranted.      Electronically signed by: Adam De MD  Date:    07/06/2022  Time:    18:28     Note: I have independently reviewed any/all imaging/labs/tests and agree with the  report (s) as documented.  Any discrepancies will be as noted/demarcated by free text.  JOSUÉ RODRIGUEZ 11/18/2024    ASSESSMENT:  1. Migraine with aura and without status migrainosus, not intractable              PLAN:  - Discussed symptoms appear to be consistent with migraines, discussed treatment options and patient agreed with the following plan:  - ppx - continue quilipta 60mg  - abortive - continue imitrex 100mg tab prn, try ubrelvy 50mg prn  - anxiety/depression - mgmt per psychiatrist  - abnormal movements - mgmt per neuro  - continue to track ha's  - Discussed goals of therapy are to decrease the frequency, intensity, and duration of headaches  - RTC 6-12 mo     Orders Placed This Encounter    atogepant (QULIPTA) 60 mg Tab    sumatriptan (IMITREX) 100 MG tablet             Questions and concerns were sought and answered to the patient's stated verbal satisfaction.  The patient verbalizes understanding and agreement with the above stated treatment plan.     CC: Teena Kothari MD Sarena Patel, PA-C  [facility]   [phone]    Dr. Doyle was available during today's encounter.       Visit today included increased complexity associated with the care of the episodic problem migraines addressed and managing the longitudinal care of the patient due to the serious and/or complex managed problem(s) migraines.

## 2024-11-18 ENCOUNTER — OFFICE VISIT (OUTPATIENT)
Dept: NEUROLOGY | Facility: CLINIC | Age: 34
End: 2024-11-18
Payer: COMMERCIAL

## 2024-11-18 DIAGNOSIS — G43.109 MIGRAINE WITH AURA AND WITHOUT STATUS MIGRAINOSUS, NOT INTRACTABLE: Primary | ICD-10-CM

## 2024-11-18 PROCEDURE — G2211 COMPLEX E/M VISIT ADD ON: HCPCS | Mod: 95,,, | Performed by: PHYSICIAN ASSISTANT

## 2024-11-18 PROCEDURE — 99214 OFFICE O/P EST MOD 30 MIN: CPT | Mod: 95,,, | Performed by: PHYSICIAN ASSISTANT

## 2024-11-18 RX ORDER — SUMATRIPTAN SUCCINATE 100 MG/1
TABLET ORAL
Qty: 18 TABLET | Refills: 5 | Status: SHIPPED | OUTPATIENT
Start: 2024-11-18

## 2024-11-18 RX ORDER — ATOGEPANT 60 MG/1
60 TABLET ORAL DAILY
Qty: 30 TABLET | Refills: 5 | Status: SHIPPED | OUTPATIENT
Start: 2024-11-18 | End: 2025-05-17

## 2024-11-26 ENCOUNTER — PATIENT MESSAGE (OUTPATIENT)
Dept: FAMILY MEDICINE | Facility: CLINIC | Age: 34
End: 2024-11-26
Payer: COMMERCIAL

## 2024-11-26 ENCOUNTER — TELEPHONE (OUTPATIENT)
Dept: NEUROLOGY | Facility: CLINIC | Age: 34
End: 2024-11-26
Payer: COMMERCIAL

## 2024-12-03 ENCOUNTER — HOSPITAL ENCOUNTER (EMERGENCY)
Facility: HOSPITAL | Age: 34
Discharge: HOME | End: 2024-12-03
Attending: EMERGENCY MEDICINE
Payer: COMMERCIAL

## 2024-12-03 VITALS
OXYGEN SATURATION: 100 % | SYSTOLIC BLOOD PRESSURE: 126 MMHG | TEMPERATURE: 99 F | HEART RATE: 96 BPM | WEIGHT: 289 LBS | RESPIRATION RATE: 18 BRPM | DIASTOLIC BLOOD PRESSURE: 88 MMHG | HEIGHT: 66 IN | BODY MASS INDEX: 46.45 KG/M2

## 2024-12-03 DIAGNOSIS — F95.9 FACIAL TIC: Primary | ICD-10-CM

## 2024-12-03 DIAGNOSIS — F44.9 CONVERSION DISORDER: ICD-10-CM

## 2024-12-03 PROCEDURE — 99284 EMERGENCY DEPT VISIT MOD MDM: CPT

## 2024-12-03 NOTE — ED NOTES
Bed: [facility]  Expected date:   Expected time:   Means of arrival:   Comments:

## 2024-12-03 NOTE — ED NOTES
Pt ambulatory to ED for worsening facial and speech ticks since Thursday.  Pt recently diagnosed with conversion disorder, is awaiting appointment with new Neurologist.  Pt denies pain, endorses exhaustion from the constant state of the facial and speech ticks.  Pt taking cogentin with minimal relief. Pt A/Ox4, calm and cooperative.  Bed low/locked, siderails upx1, pt agrees to plan of care.

## 2024-12-03 NOTE — ED PROVIDER NOTES
"Chief complaint:  conversion disorder (Patient diagnosed with conversion disorder in June of last year, patient states "ticks" have been getting worse, patient was seen at the other ER Saturday given meds and discharged, patient endorsing no improvement. Patient repeatedly opening and closing eyes. )      HPI:  Stephan Wallace is a 34 y.o. female presenting with history of migraines and diagnosed conversion disorder presents with worsening facial tics that have been going on for the last 4-5 days.  She states that it has been more intrusive recently.  She does not state that she has increased anxiety or stress.  She is hoping to talk to a neurologist about this.    ROS: As per HPI and below:  Constitutional:  No fevers, no chills  Cardiac: no chest pain  Respiratory: no shortness of breath  Abdominal: no abdominal pain, no nausea, no vomiting  Neuro: no focal numbness, no focal weakness        Review of patient's allergies indicates:  No Known Allergies    No current facility-administered medications on file prior to encounter.     Current Outpatient Medications on File Prior to Encounter   Medication Sig Dispense Refill    acetaminophen (TYLENOL EXTRA STRENGTH) 500 MG tablet Take 2 tablets (1,000 mg total) by mouth every 6 (six) hours as needed for Pain. 100 tablet 2    atogepant (QULIPTA) 60 mg Tab Take 1 tablet (60 mg total) by mouth once daily. 30 tablet 5    AUVELITY  mg TbIE Take  mg by mouth 2 (two) times a day.      benztropine (COGENTIN) 0.5 MG tablet Take 1 tablet (0.5 mg total) by mouth 2 (two) times daily. 30 tablet 0    diazePAM (VALIUM) 5 MG tablet Take 5 mg by mouth every 6 (six) hours as needed for Anxiety.      docusate sodium (COLACE) 100 MG capsule Take 1 capsule (100 mg total) by mouth 2 (two) times daily. 60 capsule 1    drospirenone-ethinyl estradioL (FRANCESCA) 3-0.03 mg per tablet Take 1 tablet by mouth once daily. 30 tablet 11    ibuprofen (ADVIL,MOTRIN) 800 MG tablet Take 1 " tablet (800 mg total) by mouth every 8 (eight) hours as needed for Pain. 60 tablet 2    meloxicam (MOBIC) 7.5 MG tablet Take 1 tablet (7.5 mg total) by mouth once daily. 60 tablet 2    naloxone (NARCAN) 4 mg/actuation Spry 4mg by nasal route as needed for opioid overdose; may repeat every 2-3 minutes in alternating nostrils until medical help arrives. Call 911 1 each 11    oxyCODONE (ROXICODONE) 10 mg Tab immediate release tablet Take 1 tablet (10 mg total) by mouth every 4 (four) hours as needed for Pain. 15 tablet 0    sumatriptan (IMITREX) 100 MG tablet Take 1 tab PO at onset of migraine, can repeat in 2 hrs if needed.  No more than twice per day or 3 days/wk. 18 tablet 5    ubrogepant (UBRELVY) 50 mg tablet Take 1 tablet by mouth at the onset of a headache. May repeat based on response and tolerability after more than 2 hours if needed. Do not take more than 200mg in a 24 hour span. 16 tablet 5    zolpidem (AMBIEN) 10 mg Tab Take 10 mg by mouth nightly as needed.         PMH:  As per HPI and below:  Past Medical History:   Diagnosis Date    Anxiety     Depression     Dissociative and conversion disorder, unspecified     Fibroids      Past Surgical History:   Procedure Laterality Date    DENTAL IMPLANT      DILATION AND CURETTAGE OF UTERUS N/A 8/20/2024    Procedure: DILATION AND CURETTAGE, UTERUS;  Surgeon: Molly Vega MD;  Location: [facility] OR;  Service: OB/GYN;  Laterality: N/A;  RN PREOP 8/15/2024 -----TYPE & SCREEN---- UPT -- ON ARRIVAL  BMI--47.79    EXPLORATORY LAPAROTOMY WITH UTERINE MYOMECTOMY N/A 8/20/2024    Procedure: LAPAROTOMY, EXPLORATORY, WITH UTERINE MYOMECTOMY;  Surgeon: Molly Vega MD;  Location: [facility] OR;  Service: OB/GYN;  Laterality: N/A;       Social History     Socioeconomic History    Marital status: Single    Number of children: 0    Highest education level: Associate degree: academic program   Tobacco Use    Smoking status: Never     Passive exposure: Never    Smokeless tobacco:  Never   Substance and Sexual Activity    Alcohol use: Not Currently     Alcohol/week: 7.0 standard drinks of alcohol     Types: 4 Glasses of wine, 3 Drinks containing 0.5 oz of alcohol per week    Drug use: Not Currently     Frequency: 4.0 times per week     Types: Marijuana    Sexual activity: Yes     Partners: Male     Birth control/protection: Coitus interruptus, Condom, OCP   Social History Narrative    She is currently working right now.      Social Drivers of Health     Financial Resource Strain: Medium Risk (12/11/2023)    Overall Financial Resource Strain (CARDIA)     Difficulty of Paying Living Expenses: Somewhat hard   Food Insecurity: No Food Insecurity (12/11/2023)    Hunger Vital Sign     Worried About Running Out of Food in the Last Year: Never true     Ran Out of Food in the Last Year: Never true   Transportation Needs: No Transportation Needs (12/11/2023)    PRAPARE - Transportation     Lack of Transportation (Medical): No     Lack of Transportation (Non-Medical): No   Physical Activity: Inactive (12/11/2023)    Exercise Vital Sign     Days of Exercise per Week: 0 days     Minutes of Exercise per Session: 0 min   Stress: Stress Concern Present (12/11/2023)    [employer] - Occupational Stress Questionnaire     Feeling of Stress : Very much   Housing Stability: Low Risk  (12/11/2023)    Housing Stability Vital Sign     Unable to Pay for Housing in the Last Year: No     Number of Places Lived in the Last Year: 1     Unstable Housing in the Last Year: No       Family History   Problem Relation Name Age of Onset    No Known Problems Mother      No Known Problems Father      No Known Problems Sister      No Known Problems Brother         Physical Exam:    Vitals:    12/03/24 1130   BP: 126/88   Pulse: 96   Resp: 18   Temp: 98.7 °F (37.1 °C)     Constitutional: Well-nourished, well-developed, in no acute distress, not cachectic  Eyes: PERRLA, EOMI, normal conjunctiva, normal  sclera  ENT: Moist Mucous membranes  Respiratory: Clear to auscultation bilaterally, no wheezes, no crackles, no rhonchi  Cardiovascular: Regular rate and rhythm, no murmurs, no rubs, no gallops  Abdominal: Soft, nontender, nondistended, no guarding, no rebound  Musculoskeletal: Normal range of motion, no obvious deformity, normal capillary refill, head atraumatic, neck supple, no meningismus  Skin: no rash, no ecchymosis, no errythema, no discharge  Neurologic: Cranial nerves II through XII intact, no motor deficits, no sensory deficits, no cerebellar deficits, patient with active facial twitches opening and closing her eyes and wrinkling her forehead.  The frequency of these twitches increase when I am speaking to her and when no one is interacting with her they slow down and even stop.  Psychological: Alert, oriented x3, normal affect, normal mood    Orders Placed This Encounter   Procedures    Ambulatory referral/consult to Neurology       Medications - No data to display      Labs Reviewed - No data to display    No orders to display       Medical Decision Making  Differential diagnosis include facial tics, conversion disorder, life-threatening partial seizure    Patient's presents with persistent facial tics that have been going on for the last couple of days.  She has been seen by headache neurology and neuropsych and has been diagnosed with conversion disorder.  This is the same symptoms that she has been having but she states that they have increased and had become more intrusive.  I do not feel that her tics are consistent with a partial seizure.  She is requesting referral to Neurology and I will place a referral for movement disorder Neurology.  I offered her Ativan but she refused stating she did not think that would help.    Amount and/or Complexity of Data Reviewed  External Data Reviewed: notes.     Details: 11/18/2024 neurology visit for her migraines   08/02/2024 neurology visit for her migraines      06/29/2023 neurology visit for conversion disorder    Risk  Prescription drug management.      Procedures          ASSESSMENT  1. Facial tic    2. Conversion disorder          Disposition:  Discharged home in stable condition    Discharge Medication List as of 12/3/2024 11:34 AM        Discharge Medication List as of 12/3/2024 11:34 AM        Discharge Medication List as of 12/3/2024 11:34 AM             Matt Grace III, MD  12/03/24 3739

## 2024-12-04 ENCOUNTER — OFFICE VISIT (OUTPATIENT)
Dept: FAMILY MEDICINE | Facility: CLINIC | Age: 34
End: 2024-12-04
Payer: COMMERCIAL

## 2024-12-04 DIAGNOSIS — G25.9 MOVEMENT DISORDER: Primary | ICD-10-CM

## 2024-12-04 PROCEDURE — 99213 OFFICE O/P EST LOW 20 MIN: CPT | Mod: 95,,, | Performed by: FAMILY MEDICINE

## 2024-12-04 NOTE — PROGRESS NOTES
The patient location is: [place]  The chief complaint leading to consultation is: referral    Visit type: audiovisual    Face to Face time with patient:   3 minutes of total time spent on the encounter, which includes face to face time and non-face to face time preparing to see the patient (eg, review of tests), Obtaining and/or reviewing separately obtained history, Documenting clinical information in the electronic or other health record, Independently interpreting results (not separately reported) and communicating results to the patient/family/caregiver, or Care coordination (not separately reported).         Each patient to whom he or she provides medical services by telemedicine is:  (1) informed of the relationship between the physician and patient and the respective role of any other health care provider with respect to management of the patient; and (2) notified that he or she may decline to receive medical services by telemedicine and may withdraw from such care at any time.    Notes:     Subjective     Patient ID: Stephan Wallace is a 34 y.o. female.    Chief Complaint: No chief complaint on file.    34 year old female presents with her mother. She was diagnosed with conversion disorder. She states she could barely keep them open. There is nothing that could be done. She needs a referral to Dr. Jose A Lujan in [place]         History of Present Illness               Review of Systems         Objective     There were no vitals filed for this visit.     Physical Exam  Physical Exam                Assessment and Plan     1. Movement disorder  -     Ambulatory referral/consult to Neurology; Future; Expected date: 12/11/2024      Diagnoses and all orders for this visit:    Movement disorder  -     Ambulatory referral/consult to Neurology; Future    Referral placed    Assessment & Plan                      No follow-ups on file.        This note was generated with the assistance of ambient listening  technology. Verbal consent was obtained by the patient and accompanying visitor(s) for the recording of patient appointment to facilitate this note. I attest to having reviewed and edited the generated note for accuracy, though some syntax or spelling errors may persist. Please contact the author of this note for any clarification.

## 2024-12-10 ENCOUNTER — OFFICE VISIT (OUTPATIENT)
Facility: CLINIC | Age: 34
End: 2024-12-10
Payer: COMMERCIAL

## 2024-12-10 VITALS — WEIGHT: 287.69 LBS | BODY MASS INDEX: 46.23 KG/M2 | HEIGHT: 66 IN

## 2024-12-10 DIAGNOSIS — F44.4 FUNCTIONAL NEUROLOGICAL SYMPTOM DISORDER WITH ABNORMAL MOVEMENT: Primary | ICD-10-CM

## 2024-12-10 DIAGNOSIS — F95.9 FACIAL TIC: ICD-10-CM

## 2024-12-10 PROCEDURE — 99215 OFFICE O/P EST HI 40 MIN: CPT | Mod: S$GLB,,, | Performed by: STUDENT IN AN ORGANIZED HEALTH CARE EDUCATION/TRAINING PROGRAM

## 2024-12-10 PROCEDURE — 99999 PR PBB SHADOW E&M-EST. PATIENT-LVL IV: CPT | Mod: PBBFAC,,, | Performed by: STUDENT IN AN ORGANIZED HEALTH CARE EDUCATION/TRAINING PROGRAM

## 2024-12-10 RX ORDER — CLONAZEPAM 1 MG/1
1 TABLET ORAL DAILY PRN
COMMUNITY
Start: 2024-12-03

## 2024-12-10 NOTE — PROGRESS NOTES
[facility] NEUROLOGY  [facility], [place]    Date: 12/10/24  Patient Name: Stephan Wallace   MRN: [id number]   PCP: Teena Kothari  Referring Provider: Matt Grace III, MD    Assessment:   Stephan Wallace is a 34 y.o. female presenting for functional neurological disorder (second opinion). Case most consistent with but not limited to FND. I agree with this diagnosis previously given to her. Her physical exam is highly suggestive of it given entrainment, distractibility, suggestibility, and patterned. No red flags on exam or history. I ask them to bring me more information regarding her previous Abilify use, and if she ever used any other anti psychotic medication. I had a long conversation with the patient about this diagnosis. She understands the possibility of having this but she is afraid that we are missing something. I offered getting a CT of the head (Can't get MRI due to metal) for reassurance, but then again I explained that this is a diagnosis of inclusion < exclusion, and that her physical exam was all I needed at this time. She appreciated the fact that I placed the order for the CT scan. I encouraged her doing more therapy, and trying the functional restoration program (order sent). I offered to see her again in 3 months at [facility]. She didn't verbalized at this time if she would like to see me back or not.     Plan:     - CT scan brain   - Referral sent to functional restoration program   - Follow up with counselor   - Follow up in 3 months at [facility]     Problem List Items Addressed This Visit          Neuro    Facial tic    Relevant Orders    CT Head Without Contrast       Psychiatric    Functional neurological symptom disorder with abnormal movement - Primary    Relevant Orders    Ambulatory referral/consult to [facility]     Sergey Trejo MD    Subjective:   Patient seen in consultation at the request of Matt Grace III, MD for the  "evaluation of functional neurological disorder (second opinion). A copy of this note will be sent to the referring physician.      HPI 12/10/24:   Ms. Stephan Wallace is a 34 y.o. female presenting for functional neurological disorder (second opinion). She has a history of anxiety, depression, abuse/PTSD, psychosis, migraines, and last year was diagnosed with FND by neurology and neuropsychology. Last year, she saw Dr. Dawson due to abnormal movement that included tremors of hands, blinking of eyes, and eye brown movements. Possible diagnosis included Tourette's, functional, and TD. TD was considered due to her being on Abilify 1 year before due to her psychiatric conditions. This medication was later stopped though. Unclear for how long and how much. She then saw neuropsychology who agreed with FND/ "conversion". She was instructed to do therapy. Its been one year and she feels that if anything her symptoms have worsened. She is concerned about the fact that no one has tested her for anything else. Mother is here today.     PAST MEDICAL HISTORY:  Past Medical History:   Diagnosis Date    Anxiety     Depression     Dissociative and conversion disorder, unspecified     Fibroids        PAST SURGICAL HISTORY:  Past Surgical History:   Procedure Laterality Date    DENTAL IMPLANT      DILATION AND CURETTAGE OF UTERUS N/A 8/20/2024    Procedure: DILATION AND CURETTAGE, UTERUS;  Surgeon: Molly Vega MD;  Location: [facility] OR;  Service: OB/GYN;  Laterality: N/A;  RN PREOP 8/15/2024 -----TYPE & SCREEN---- UPT -- ON ARRIVAL  BMI--47.79    EXPLORATORY LAPAROTOMY WITH UTERINE MYOMECTOMY N/A 8/20/2024    Procedure: LAPAROTOMY, EXPLORATORY, WITH UTERINE MYOMECTOMY;  Surgeon: Molly Vega MD;  Location: [facility] OR;  Service: OB/GYN;  Laterality: N/A;       CURRENT MEDS:  Current Outpatient Medications   Medication Sig Dispense Refill    atogepant (QULIPTA) 60 mg Tab Take 1 tablet (60 mg total) by mouth once daily. 30 " tablet 5    AUVELITY  mg TbIE Take  mg by mouth 2 (two) times a day.      benztropine (COGENTIN) 0.5 MG tablet Take 1 tablet (0.5 mg total) by mouth 2 (two) times daily. 30 tablet 0    clonazePAM (KLONOPIN) 1 MG tablet Take 1 mg by mouth daily as needed.      diazePAM (VALIUM) 5 MG tablet Take 5 mg by mouth every 6 (six) hours as needed for Anxiety.      drospirenone-ethinyl estradioL (FRANCESCA) 3-0.03 mg per tablet Take 1 tablet by mouth once daily. 30 tablet 11    naloxone (NARCAN) 4 mg/actuation Spry 4mg by nasal route as needed for opioid overdose; may repeat every 2-3 minutes in alternating nostrils until medical help arrives. Call 911 1 each 11    sumatriptan (IMITREX) 100 MG tablet Take 1 tab PO at onset of migraine, can repeat in 2 hrs if needed.  No more than twice per day or 3 days/wk. 18 tablet 5    ubrogepant (UBRELVY) 50 mg tablet Take 1 tablet by mouth at the onset of a headache. May repeat based on response and tolerability after more than 2 hours if needed. Do not take more than 200mg in a 24 hour span. 16 tablet 5    zolpidem (AMBIEN) 10 mg Tab Take 10 mg by mouth nightly as needed.       No current facility-administered medications for this visit.       ALLERGIES:  Review of patient's allergies indicates:  No Known Allergies    FAMILY HISTORY:  Family History   Problem Relation Name Age of Onset    No Known Problems Mother      No Known Problems Father      No Known Problems Sister      No Known Problems Brother         SOCIAL HISTORY:  Social History     Tobacco Use    Smoking status: Never     Passive exposure: Never    Smokeless tobacco: Never   Substance Use Topics    Alcohol use: Not Currently     Alcohol/week: 7.0 standard drinks of alcohol     Types: 4 Glasses of wine, 3 Drinks containing 0.5 oz of alcohol per week    Drug use: Not Currently     Frequency: 4.0 times per week     Types: Marijuana       Review of Systems:  12 system review of systems is negative except for the  "symptoms mentioned in HPI.      Objective:     Vitals:    12/10/24 1107   Weight: 130.5 kg (287 lb 11.2 oz)   Height: 5' 6" (1.676 m)     General: NAD, well nourished   Eyes: no tearing, discharge, no erythema   ENT: moist mucous membranes of the oral cavity, nares patent    Neck: Supple, full range of motion  Cardiovascular: Warm and well perfused, pulses equal and symmetrical  Lungs: Normal work of breathing, normal chest wall excursions  Skin: No rash, lesions, or breakdown on exposed skin  Psychiatry: Mood and affect are appropriate   Abdomen: soft, non tender, non distended  Extremeties: No cyanosis, clubbing or edema.    Neurological   MENTAL STATUS: Alert and oriented to person, place, and time. Attention and concentration within normal limits. Speech without dysarthria, able to name and repeat without difficulty. Recent and remote memory within normal limits   CRANIAL NERVES: Visual fields intact. PERRL. EOMI. Facial sensation intact. Face symmetrical. Hearing grossly intact. Full shoulder shrug bilaterally. Tongue protrudes midline   SENSORY: Sensation is intact to pin throughout.  Joint position perception intact. Negative Romberg.   MOTOR: Normal bulk and tone. No pronator drift.  5/5 deltoid, biceps, triceps, interosseous, hand  bilaterally. 5/5 iliopsoas, knee extension/flexion, foot dorsi/plantarflexion bilaterally.    REFLEXES: Symmetric and 2+ throughout. Toes down going bilaterally.   CEREBELLAR/COORDINATION/GAIT: Gait steady with normal arm swing and stride length.  Heel to shin intact. Finger to nose intact. Normal rapid alternating movements.     Movement exam:  At rest, forceful bilateral blinking movements that alternate between this and eye brown vertical movements. These movements were distractible, there was entrainment present while finger tapping, and I was able to separate the blinking movements from the eye brow movements by distraction and by having her close her eyes and perform " other tasks.     I spent a total of 45 minutes on the day of the visit.   This includes face to face time and non-face to face time preparing to see the patient (eg, review of tests), obtaining and/or reviewing separately obtained history, documenting clinical information in the electronic or other health record, independently interpreting results and communicating results to the patient/family/caregiver, or care coordinator.      Sergey Trejo MD  Neurology

## 2024-12-13 ENCOUNTER — PATIENT MESSAGE (OUTPATIENT)
Facility: CLINIC | Age: 34
End: 2024-12-13
Payer: COMMERCIAL

## 2024-12-18 DIAGNOSIS — G43.109 MIGRAINE WITH AURA AND WITHOUT STATUS MIGRAINOSUS, NOT INTRACTABLE: ICD-10-CM

## 2024-12-19 RX ORDER — ATOGEPANT 60 MG/1
1 TABLET ORAL DAILY
Qty: 90 TABLET | Refills: 1 | Status: SHIPPED | OUTPATIENT
Start: 2024-12-19 | End: 2025-06-17

## 2024-12-21 ENCOUNTER — PATIENT MESSAGE (OUTPATIENT)
Dept: NEUROLOGY | Facility: CLINIC | Age: 34
End: 2024-12-21
Payer: COMMERCIAL

## 2024-12-23 ENCOUNTER — OFFICE VISIT (OUTPATIENT)
Dept: PAIN MEDICINE | Facility: OTHER | Age: 34
End: 2024-12-23
Payer: COMMERCIAL

## 2024-12-23 DIAGNOSIS — R68.89 DECREASED STRENGTH, ENDURANCE, AND MOBILITY: ICD-10-CM

## 2024-12-23 DIAGNOSIS — Z74.09 DECREASED STRENGTH, ENDURANCE, AND MOBILITY: ICD-10-CM

## 2024-12-23 DIAGNOSIS — F44.4 FUNCTIONAL NEUROLOGICAL SYMPTOM DISORDER WITH ABNORMAL MOVEMENT: Primary | ICD-10-CM

## 2024-12-23 DIAGNOSIS — R53.1 DECREASED STRENGTH, ENDURANCE, AND MOBILITY: ICD-10-CM

## 2024-12-23 DIAGNOSIS — R53.83 FATIGUE, UNSPECIFIED TYPE: ICD-10-CM

## 2024-12-23 PROCEDURE — 99205 OFFICE O/P NEW HI 60 MIN: CPT | Mod: ,,, | Performed by: NURSE PRACTITIONER
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
## 2025-01-02 ENCOUNTER — PATIENT MESSAGE (OUTPATIENT)
Dept: NEUROLOGY | Facility: CLINIC | Age: 35
End: 2025-01-02
Payer: COMMERCIAL

## 2025-01-09 NOTE — PROGRESS NOTES
Stephan participated in OT eval this PM. Please see plan of care for details.     DAYO Sheehan, OTR/L, CEAS-I  1/10/2025

## 2025-01-10 ENCOUNTER — CLINICAL SUPPORT (OUTPATIENT)
Dept: REHABILITATION | Facility: OTHER | Age: 35
End: 2025-01-10
Payer: COMMERCIAL

## 2025-01-10 DIAGNOSIS — R68.89 DECREASED STRENGTH, ENDURANCE, AND MOBILITY: ICD-10-CM

## 2025-01-10 DIAGNOSIS — R53.1 DECREASED STRENGTH, ENDURANCE, AND MOBILITY: ICD-10-CM

## 2025-01-10 DIAGNOSIS — F44.4 FUNCTIONAL NEUROLOGICAL SYMPTOM DISORDER WITH ABNORMAL MOVEMENT: ICD-10-CM

## 2025-01-10 DIAGNOSIS — Z74.09 DECREASED STRENGTH, ENDURANCE, AND MOBILITY: ICD-10-CM

## 2025-01-10 DIAGNOSIS — R53.83 FATIGUE, UNSPECIFIED TYPE: ICD-10-CM

## 2025-01-10 DIAGNOSIS — G25.9 FUNCTIONAL MOVEMENT DISORDER: Primary | ICD-10-CM

## 2025-01-10 DIAGNOSIS — R68.89 DECREASED FUNCTIONAL ACTIVITY TOLERANCE: Primary | ICD-10-CM

## 2025-01-10 PROCEDURE — 97166 OT EVAL MOD COMPLEX 45 MIN: CPT

## 2025-01-10 PROCEDURE — 97530 THERAPEUTIC ACTIVITIES: CPT

## 2025-01-10 PROCEDURE — 97163 PT EVAL HIGH COMPLEX 45 MIN: CPT

## 2025-01-10 NOTE — PATIENT INSTRUCTIONS
The physical cycle is very intuitive for people! As pain increases & you avoid activities, you get weaker. This leads to more pain, which leads to more activity avoidance & the cycle fuels on leading to maladaptive behaviors. You start avoiding activities you love to do & start having pain with normal & safe activities.    With chronic conditions, it is important to consider all of the negative psychological factors associated with pain. How we experience pain is based on the context of our lives. You are limited & thus getting less renata out of life, but also have all of the past experiences with pain looming over you. This negative context is scientifically proven to cause the brain to become more sensitive & perceive more pain. Pain may change or spread, & you may to start feeling pain from non-painful stimuli, such as movement & everyday life. This does not mean your pain is not real, it just means the brain is producing more than it should & your mind needs to be retrained along with your body.
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
## 2025-01-27 ENCOUNTER — OFFICE VISIT (OUTPATIENT)
Facility: CLINIC | Age: 35
End: 2025-01-27
Payer: COMMERCIAL

## 2025-01-27 VITALS
WEIGHT: 290 LBS | DIASTOLIC BLOOD PRESSURE: 86 MMHG | BODY MASS INDEX: 46.61 KG/M2 | HEART RATE: 89 BPM | SYSTOLIC BLOOD PRESSURE: 119 MMHG | HEIGHT: 66 IN

## 2025-01-27 DIAGNOSIS — F44.4 FUNCTIONAL NEUROLOGICAL SYMPTOM DISORDER WITH ABNORMAL MOVEMENT: Primary | ICD-10-CM

## 2025-01-27 PROCEDURE — 99999 PR PBB SHADOW E&M-EST. PATIENT-LVL III: CPT | Mod: PBBFAC,,, | Performed by: STUDENT IN AN ORGANIZED HEALTH CARE EDUCATION/TRAINING PROGRAM

## 2025-01-27 PROCEDURE — 99215 OFFICE O/P EST HI 40 MIN: CPT | Mod: S$GLB,,, | Performed by: STUDENT IN AN ORGANIZED HEALTH CARE EDUCATION/TRAINING PROGRAM

## 2025-01-27 NOTE — PROGRESS NOTES
[facility] - NEUROLOGICAL MOVEMENT DISORDERS 8TH FLOOR  [facility], [place]    Date: 1/27/25  Patient Name: Stephan Wallace   MRN: [id number]   PCP: Teena Kothari  Referring Provider: Other    Assessment:   Stephan Wallace is a 34 y.o. female presenting for FND follow up. Had a significant conversation about how she is basically untreated. She has a better and positive attitude about her disease. We will focus on the functional restoration program + referral to neuropsychology (She has been doing counseling but her counselor states she has no experience on FND). Provided reassuring and went through the plan one more time. Continue follow up with psychiatry. Will plan a follow up in 3 months.     Plan:     - Continue/start functional restoration program. 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 A copy of this note will be sent to the referring physician.      HPI 1/27/25:   Ms. Stephan Wallace is a 34 y.o. female presenting for FND follow up. She did meet with the functional restoration team but is waiting on her work schedule to be better so she can actually start going to the appointments. She has been doing counseling but her counselor states she has no experience on FND. She has a better attitude towards the disease and expectations. She understand that she is basically untreated at this time. She has no updates or any new neurological complaints.     Per previous by myself on 1/27/25:    Assessment:   Stephan ESPARZA  Madison is a 34 y.o. female presenting for functional neurological disorder (second opinion). Case most consistent with but not limited to FND. I agree with this diagnosis previously given to her. Her physical exam is highly suggestive of it given entrainment, distractibility, suggestibility, and patterned. No red flags on exam or history. I ask them to bring me more information regarding her previous Abilify use, and if she ever used any other anti psychotic medication. I had a long conversation with the patient about this diagnosis. She understands the possibility of having this but she is afraid that we are missing something. I offered getting a CT of the head (Can't get MRI due to metal) for reassurance, but then again I explained that this is a diagnosis of inclusion < exclusion, and that her physical exam was all I needed at this time. She appreciated the fact that I placed the order for the CT scan. I encouraged her doing more therapy, and trying the functional restoration program (order sent). I offered to see her again in 3 months at [facility]. She didn't verbalized at this time if she would like to see me back or not.      Plan:      - CT scan brain   - Referral sent to functional restoration program   - Follow up with counselor   - Follow up in 3 months at [facility]     PAST MEDICAL HISTORY:  Past Medical History:   Diagnosis Date    Anxiety     Depression     Dissociative and conversion disorder, unspecified     Fibroids      PAST SURGICAL HISTORY:  Past Surgical History:   Procedure Laterality Date    DENTAL IMPLANT      DILATION AND CURETTAGE OF UTERUS N/A 8/20/2024    Procedure: DILATION AND CURETTAGE, UTERUS;  Surgeon: Molly Vega MD;  Location: [facility];  Service: OB/GYN;  Laterality: N/A;  RN PREOP 8/15/2024 -----TYPE & SCREEN---- UPT -- ON ARRIVAL  BMI--47.79    EXPLORATORY LAPAROTOMY WITH UTERINE MYOMECTOMY N/A 8/20/2024    Procedure: LAPAROTOMY, EXPLORATORY, WITH UTERINE MYOMECTOMY;   Surgeon: Molly Vega MD;  Location: [facility];  Service: OB/GYN;  Laterality: N/A;     CURRENT MEDS:  Current Outpatient Medications   Medication Sig Dispense Refill    atogepant (QULIPTA) 60 mg Tab Take 1 tablet (60 mg total) by mouth once daily. 90 tablet 1    AUVELITY  mg TbIE Take  mg by mouth 2 (two) times a day.      diazePAM (VALIUM) 5 MG tablet Take 5 mg by mouth every 6 (six) hours as needed for Anxiety.      drospirenone-ethinyl estradioL (FRANCESCA) 3-0.03 mg per tablet Take 1 tablet by mouth once daily. 30 tablet 11    naloxone (NARCAN) 4 mg/actuation Spry 4mg by nasal route as needed for opioid overdose; may repeat every 2-3 minutes in alternating nostrils until medical help arrives. Call 911 1 each 11    sumatriptan (IMITREX) 100 MG tablet Take 1 tab PO at onset of migraine, can repeat in 2 hrs if needed.  No more than twice per day or 3 days/wk. 18 tablet 5    ubrogepant (UBRELVY) 50 mg tablet Take 1 tablet by mouth at the onset of a headache. May repeat based on response and tolerability after more than 2 hours if needed. Do not take more than 200mg in a 24 hour span. 16 tablet 5     No current facility-administered medications for this visit.     ALLERGIES:  Review of patient's allergies indicates:  No Known Allergies    FAMILY HISTORY:  Family History   Problem Relation Name Age of Onset    No Known Problems Mother      No Known Problems Father      No Known Problems Sister      No Known Problems Brother       SOCIAL HISTORY:  Social History     Tobacco Use    Smoking status: Never     Passive exposure: Never    Smokeless tobacco: Never   Substance Use Topics    Alcohol use: Not Currently     Alcohol/week: 7.0 standard drinks of alcohol     Types: 4 Glasses of wine, 3 Drinks containing 0.5 oz of alcohol per week    Drug use: Not Currently     Frequency: 4.0 times per week     Types: Marijuana     Review of Systems:  12 system review of systems is negative except for the symptoms  "mentioned in HPI.      Objective:     Vitals:    01/27/25 0801   BP: 119/86   BP Location: Right arm   Patient Position: Sitting   Pulse: 89   Weight: 131.5 kg (290 lb)   Height: 5' 6" (1.676 m)     General: NAD, well nourished   Eyes: no tearing, discharge, no erythema   ENT: moist mucous membranes of the oral cavity, nares patent    Neck: Supple, full range of motion  Cardiovascular: Warm and well perfused, pulses equal and symmetrical  Lungs: Normal work of breathing, normal chest wall excursions  Skin: No rash, lesions, or breakdown on exposed skin  Psychiatry: Mood and affect are appropriate   Abdomen: soft, non tender, non distended  Extremeties: No cyanosis, clubbing or edema.    Neurological   MENTAL STATUS: Alert and oriented to person, place, and time. Attention and concentration within normal limits. Speech without dysarthria, able to name and repeat without difficulty. Recent and remote memory within normal limits   CRANIAL NERVES: Visual fields intact. PERRL. EOMI. Facial sensation intact. Face symmetrical. Hearing grossly intact. Full shoulder shrug bilaterally. Tongue protrudes midline   SENSORY: Sensation is intact to pin throughout.  Joint position perception intact. Negative Romberg.   MOTOR: Normal bulk and tone. No pronator drift.  5/5 deltoid, biceps, triceps, interosseous, hand  bilaterally. 5/5 iliopsoas, knee extension/flexion, foot dorsi/plantarflexion bilaterally.    REFLEXES: Symmetric and 2+ throughout. Toes down going bilaterally.   CEREBELLAR/COORDINATION/GAIT: Gait steady with normal arm swing and stride length.  Heel to shin intact. Finger to nose intact. Normal rapid alternating movements.      Movement exam:  At rest, forceful bilateral blinking movements that alternate between this and eye brown vertical movements. These movements were distractible, there was entrainment present while finger tapping, and I was able to separate the blinking movements from the eye brow movements " by distraction and by having her close her eyes and perform other tasks.     I spent a total of 50 minutes on the day of the visit.   This includes face to face time and non-face to face time preparing to see the patient (eg, review of tests), obtaining and/or reviewing separately obtained history, documenting clinical information in the electronic or other health record, independently interpreting results and communicating results to the patient/family/caregiver, or care coordinator.      Sergey Trejo MD  Neurology

## 2025-02-17 ENCOUNTER — CLINICAL SUPPORT (OUTPATIENT)
Dept: REHABILITATION | Facility: OTHER | Age: 35
End: 2025-02-17
Payer: COMMERCIAL

## 2025-02-17 ENCOUNTER — CLINICAL SUPPORT (OUTPATIENT)
Dept: PSYCHIATRY | Facility: OTHER | Age: 35
End: 2025-02-17
Payer: COMMERCIAL

## 2025-02-17 DIAGNOSIS — F41.1 GENERALIZED ANXIETY DISORDER WITH PANIC ATTACKS: Primary | ICD-10-CM

## 2025-02-17 DIAGNOSIS — F41.0 GENERALIZED ANXIETY DISORDER WITH PANIC ATTACKS: Primary | ICD-10-CM

## 2025-02-17 DIAGNOSIS — R68.89 DECREASED FUNCTIONAL ACTIVITY TOLERANCE: Primary | ICD-10-CM

## 2025-02-17 DIAGNOSIS — F44.4 FUNCTIONAL NEUROLOGICAL SYMPTOM DISORDER WITH ABNORMAL MOVEMENT: ICD-10-CM

## 2025-02-17 DIAGNOSIS — G25.9 FUNCTIONAL MOVEMENT DISORDER: Primary | ICD-10-CM

## 2025-02-17 DIAGNOSIS — Z74.09 DECREASED STRENGTH, ENDURANCE, AND MOBILITY: ICD-10-CM

## 2025-02-17 DIAGNOSIS — R68.89 DECREASED STRENGTH, ENDURANCE, AND MOBILITY: ICD-10-CM

## 2025-02-17 DIAGNOSIS — R53.83 FATIGUE, UNSPECIFIED TYPE: ICD-10-CM

## 2025-02-17 DIAGNOSIS — R53.1 DECREASED STRENGTH, ENDURANCE, AND MOBILITY: ICD-10-CM

## 2025-02-17 DIAGNOSIS — R68.89 DECREASED FUNCTIONAL ACTIVITY TOLERANCE: ICD-10-CM

## 2025-02-17 PROCEDURE — 97112 NEUROMUSCULAR REEDUCATION: CPT | Mod: CQ

## 2025-02-17 PROCEDURE — 90791 PSYCH DIAGNOSTIC EVALUATION: CPT | Mod: ,,, | Performed by: SOCIAL WORKER

## 2025-02-17 PROCEDURE — 97110 THERAPEUTIC EXERCISES: CPT

## 2025-02-17 PROCEDURE — 97530 THERAPEUTIC ACTIVITIES: CPT

## 2025-02-17 PROCEDURE — 97530 THERAPEUTIC ACTIVITIES: CPT | Mod: CQ
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
## 2025-02-17 NOTE — PROGRESS NOTES
[facility]  Functional Restoration Program  Occupational Therapy Treatment Note  FRP Day 1      Name: Stephan Wallace  MRN:[id number]  Encounter Date: 2/17/2025    Diagnosis:   Encounter Diagnosis   Name Primary?    Functional movement disorder Yes     Referring provider: Aleksandra Rodriguez NP    Physician Orders: eval and treat; [facility]  Medical Diagnosis: F44.4 (ICD-10-CM) - Functional neurological symptom disorder with abnormal movement R53.1,Z74.09,R68.89 (ICD-10-CM) - Decreased strength, endurance, and mobility R53.83 (ICD-10-CM) - Fatigue, unspecified type  Evaluation Date: 1/10/2025  Insurance Authorization Period Expiration: 12/31/2025  Plan of Care Certification Period: 4/21/2025  Visit # / Visits authorized: 1/10, plus eval  FOTO completed: 1/3     Precautions:  Standard and Fall    Time In: 15:40  Time Out: 16:30  Total Appointment Time: 50 minutes    SUBJECTIVE     She reports : I am coming here after work.     Stephan was not compliant with parts of home exercise program given previously. Writing therapist reminded Stephan of importance of completion of exercises and activities outside of session to attain goals.     Response to previous treatment: Ms. Wallace noted: it was a hard day mentally.    Functional change: Eval only at this time.     Pain:       Pain Related Behaviors Observed: yes; currently rating pain as 4/10  Functional Pain Scale Rating 0-10: within the last 24 hours  Date 1/10/2025 2/17/2025   Average 5/10 5/10   Worst 7/10 8/10   Best 1/10 3/10   Composite score 4.33/10 5.33/10   [JESUS  is 1 point or 15-20% change in interference composite score (Aleshiain et al. 2008)]     Location: head/face, mouth, B hand    Description: fatigue, exhaustion  Aggravating Factors: anxiety, depression, stress    Easing Factors: anxiety meds     Patient Specific Activities based on Personal goals:  Activity  2/17/2025    Performance (P) Satisfaction (S)   Driving 5 2   2.  Engaging in Orthodox  activities 5 4   3.  Engaging in activities outside of work 5 4   4.  Walking (grocery store, in the park/festival 7 3   5.  Chores 6 2           Total Score  5.6 5    Ratin-10; unable/unsatisfied - Able/Satisfied     Objective     Objective Measures updated at progress report unless specified.    COGNITIVE Exam  Behaviors: pleasant, cooperative. Movements/tics throughout session, in face, UE. Reports difficulties initiation.  Memory: brain fog. Difficulty multitasking. Not tested during eval.  Safety awareness/insight to disability: impaired judgment and impaired insight; pushes through at work, unable to do this outside work.   Coping skills/emotional control: listening to music. During session anxious, but participating.     Visual/Perceptual:  Tracking: attempted to test; difficult tracking due to movements affecting maintaining eye on target.  Saccades: not tested  Acuity: wears glasses  Convergence: impaired, with decreased ability to accommodate.     Dominant hand: right     Active Range of Motion  Upper Extremity 1/10/2025    RUE LUE   Hands WFL WFL   Wrist WFL WFL   Elbows WFL WFL   Shoulders WFL WFL      Upper Extremity Strength - Manual Muscle Testing       Motion Tested 1/10/2025    Right Left    Shoulder Flexion 4+/5 5/5   Shoulder Extension 5/5 5/5   Shoulder Abduction 5/5 5/5   Shoulder IR 5/5 5/5   Shoulder ER 4/5 4+/5   Elbow Flexion 5/5 5/5   Elbow Extension 5/5 5/5       Strength (in pounds, rung II, average of three trials) - NT (tool being calibrated)     Functional Strength  Pile Testing: Lifting    1/10/2025 (lbs/HR/CHIOMA)    Repetitive Floor to Waist NT due to time   Repetitive Waist to Shoulder    1- Time Maximum     Carry-2 Handed (40ft)     Work demand Level   TBD   Reason for stop point     comment Increased frustration reported after instruction given. Educated in [email or web address].   The work demand level listed above is based on the physical performance screening test performed. More  comprehensive physical testing integrated with clinical findings would be required to derived an accurate work capacity.      Balance  5 times sit-stand (adults 18-65 y/o) 1/10/2025   >12 sec= fall risk for general elderly  >16 sec= fall risk for Parkinson's disease  >10 sec= balance/vestibular dysfunction (<61 y/o)  >14.2 sec= balance/vestibular dysfunction (>61 y/o)  >12 sec= fall risk for CVA 13.47 seconds     (without UE used to push up from chair)      Endurance Testing: Modified Ramp Treadmill Test    1/10/2025   Minutes Completed  3 minutes 5 seconds   % Grades  10 %   Speed (mph)  2.1 mph attempted   METS 4    bpm   Panchito Rating Perceived Exertion Scale   5   Reason for stop point Fatigue, Decline in maintaining pace safely , Increased discomfort   Comments Reports achilles pain left         Limitation/Restriction for FOTO NOC Survey     Therapist reviewed FOTO scores for Stephan ESPARZA Encalade on 1/10/2025.   FOTO documents entered into EPIC - see Media section.     Limitation Score: 53%                 Treatment     Stephan received the treatments listed below:     Therapeutic activities and functional lifting activities in order to increase participation in, endurance for, and performance with vocational, selfcare ADLs, and leisure activities in order to improve quality of life, for 35 minutes, including:    While seated, review of FRP binder regarding body mechanics and tips to prevent back pain, including review of recommended daily water intake based on body weight.   Introduction to stretching as tool to manage discomfort, as well as importance, and use of PANCHITO scale to guide pacing. Used [email or web address] ball for walk outs for modified forward flex stretch.     Discussed established goals and activities regarding personal goals; reviewed and revised goals based on Stephan's feedback. Stephan rated performance of activities related to personal goals, as well as satisfaction of said performance. (Refer to  subjective section for details).    Therapeutic exercises to develop endurance, flexibility, posture, and core stabilization for 15 minutes, including:    Stephan participated in endurance activity using Nustep for 15 minutes, starting at level 1 to 1 to improve functional endurance and progress towards increased MET level for improved community mobility and participation, rated as Sort of hard (4/10) exertion, Stephan re-educated on how to add mindfulness component to activity and how to pace appropriately by completed self check ins and grading activity as needed, with goal to reach moderate to sort of hard by end of activity. Completed 1138, and 1058 elevation'.    No environmental, cultural, spiritual, developmental or education needs expressed or noted.    Patient Education and Home Exercises   Education provided:   - Progress towards goals   - importance of completion of exercises and activities outside of session to attain goals.   - proper posture and body mechanics.  - anticipated muscular soreness following lift testing and strategies for management including stretching, using ice, scheduled rest.  - Functional pain scale  - CHIOMA rate of perceived exertion scale.   - pain cycle  - pursed lip and diaphragmatic breathing techniques  - details of the Functional Restoration Program  - palming  - options for PNS activation: humming, singing out loud.      Written Home Exercises Provided: Patient instructed to cont prior HEP.  Exercises were reviewed and Nancyluizaroderick was able to demonstrate them prior to the end of the session. Betitoroderick demonstrated good  understanding of the education provided.     See EMR under Patient Instructions for exercises provided during therapy sessions. Patient education also located in FRP binder provided on day 1 of the cohort.     Assessment     Ms. Wallace tolerated today's session well, with general introduction to FRP, stretch for pain relief and coordination of breathing performed.  "Review of  handout "tips to manage pain", Stephan with good comprehension of education provided. In rating personal goals for performance and satisfaction, Stephan stated that goals remain appropriate.     Stephan is progressing well towards her goals and there are no updates to goals at this time. Stephan's prognosis is good, due to motivation, if able to commit to therapy.  Stephan would continue to benefit from skilled outpatient occupational therapy to address the deficits listed in the problem list on initial evaluation, provide patient education, and to maximize patient's level of independence in the home and community environment.     Anticipated barriers to occupational therapy: not yet identified    Goals:     SHORT Term goals: In 3 weeks, patient will:  Status of goal:   Implements correct use of breathing techniques during functional lifting tasks, with minimal cues needed. Initiated   Utilizes CHIOMA rate of exertion scale to pace performance with functional lifting tasks, and implements self-care strategies during activity participation to manage pain. Initiated   Verbalize understanding of energy conservation and pacing strategies during daily habits, roles, and routines in order to improve tolerance for work and home demands.  Initiated   Completes 5x sit to stand test in 12 seconds or less to improve ability to participate in community mobility.   Initiated   Demonstrate increased positional tolerance to the level needed for work, home, and community activities (standing in cue at social event, managing supplies for outing, streneous IADL), as evidenced by having a METS level of 5. Initiated   Demonstrate proper body mechanics with functional lifting tasks (floor to waist, waist to shoulder, maximum lift, and box carry), via teach back method with minimal cues needed. Initiated   Demonstrate proper sequencing  when lifting waist to shoulder for management of (I)ADL, including dressing and grooming, " placing items in kitchen cabinets, folding towels, and/or managing suitcase when traveling.   Initiated   Demonstrate proper sequencing when lifting floor to waist to meet demand of work/home routine, including lifting groceries, managing laundry, car parts, and/or managing suitcase when traveling.   Initiated   Tolerate Home Activity Plan (HAP)/ Home Exercise Program (HEP) to promote safety and independence with ADL, with report of completion of at least 2 out of 4 days outside of program participation.  Initiated   Show improved perception of own abilities as evidenced by increase of FOTO scores to established MDC value and perception of performance ratings regarding personal long term goals.  Initiated         Long Term goals: In 9 weeks, patient will: Status of goal:   Report implementation of application of mindfulness, stretching, and other coping strategies for improved participation in daily routine. Initiated   Verbalize functional application of lifting techniques in daily life (golfers lift, floor to waist, waist to shoulder). Initiated   Completes 5x sit to stand test in 9 seconds or less to improve ability to participate in community mobility.  Initiated   Applies functional application of lifting techniques (golfer's lift, floor to waist, waist to shoulder) in activities of daily life, per patient report.  Initiated   Demonstrate physical capacities consistent with a Medium (#50 max, frequent lifting/carrying #25, 3.5 METS)  demand level, as needed to perform activities to be successful in roles of life, regarding Full Time Employment and Household Management. Initiated   Have an improvement of 3 points in 2/5 personal goals in rating of  performance.  Initiated   Show improved perception of own abilities as evidenced by increase of FOTO scores to established MC II value and perception of performance ratings regarding personal long term goals.  Initiated      Patient Specific Goals; to be met after 2  month of (I) application of tools/techniques learned.  Vocational:  at work   Recreational: engaging in hobby, playing guitar, Oriental orthodox activities   Daily Living Activities: driving, chores   Measured by patient's self-reported performance and satisfaction of personal FRP goals, listed above in subjective section.   Total Score = sum of the activity performance scores / number of activities  Minimum detectable change (90%CI) for average score = 2 points  Minimum detectable change (90%CI) for single activity score  = 3 points    Plan     Continue per plan of care.     Updates/Grading for next session: progress as tolerated, take  measures.    DAYO Sheehan OTR/L, CEAS-I  2/17/2025

## 2025-02-21 ENCOUNTER — CLINICAL SUPPORT (OUTPATIENT)
Dept: REHABILITATION | Facility: OTHER | Age: 35
End: 2025-02-21
Payer: COMMERCIAL

## 2025-02-21 ENCOUNTER — CLINICAL SUPPORT (OUTPATIENT)
Dept: PSYCHIATRY | Facility: OTHER | Age: 35
End: 2025-02-21
Payer: COMMERCIAL

## 2025-02-21 DIAGNOSIS — G25.9 FUNCTIONAL MOVEMENT DISORDER: Primary | ICD-10-CM

## 2025-02-21 DIAGNOSIS — R68.89 DECREASED STRENGTH, ENDURANCE, AND MOBILITY: ICD-10-CM

## 2025-02-21 DIAGNOSIS — R53.83 FATIGUE, UNSPECIFIED TYPE: ICD-10-CM

## 2025-02-21 DIAGNOSIS — R53.1 DECREASED STRENGTH, ENDURANCE, AND MOBILITY: ICD-10-CM

## 2025-02-21 DIAGNOSIS — R68.89 DECREASED FUNCTIONAL ACTIVITY TOLERANCE: Primary | ICD-10-CM

## 2025-02-21 DIAGNOSIS — F41.0 GENERALIZED ANXIETY DISORDER WITH PANIC ATTACKS: Primary | ICD-10-CM

## 2025-02-21 DIAGNOSIS — F44.4 FUNCTIONAL NEUROLOGICAL SYMPTOM DISORDER WITH ABNORMAL MOVEMENT: ICD-10-CM

## 2025-02-21 DIAGNOSIS — F41.1 GENERALIZED ANXIETY DISORDER WITH PANIC ATTACKS: Primary | ICD-10-CM

## 2025-02-21 DIAGNOSIS — Z74.09 DECREASED STRENGTH, ENDURANCE, AND MOBILITY: ICD-10-CM

## 2025-02-21 DIAGNOSIS — R68.89 DECREASED FUNCTIONAL ACTIVITY TOLERANCE: ICD-10-CM

## 2025-02-21 PROCEDURE — 90853 GROUP PSYCHOTHERAPY: CPT | Mod: ,,, | Performed by: SOCIAL WORKER

## 2025-02-21 PROCEDURE — 97530 THERAPEUTIC ACTIVITIES: CPT

## 2025-02-21 PROCEDURE — 97530 THERAPEUTIC ACTIVITIES: CPT | Mod: CQ

## 2025-02-21 PROCEDURE — 97112 NEUROMUSCULAR REEDUCATION: CPT | Mod: CQ

## 2025-02-21 NOTE — PROGRESS NOTES
Group Psychotherapy    Site: [facility]    Clinical status of patient: Outpatient    2/21/2025    Length of service:[id number]    Referred by: Functional Restoration Program     Number of patients in attendance: 5    Target symptoms: Chronic Pain Management     Patient's response to intervention:  The patient's response to intervention is active listening, self-disclosure.    Progress toward goals and other mental status changes:  The patient's progress toward goals is good.    Plan: group psychotherapy    Topics Covered: Pt attended CBT for Chronic Pain as part of their participation in Functional Restoration. Topics discussed today included The Stages of Change/Sleep Hygiene and how they relate to chronic pain, depression, anxiety and other mood disorders.

## 2025-02-21 NOTE — PROGRESS NOTES
[facility]  Functional Restoration Program  Occupational Therapy Treatment Note  [facility] Day 3    Name: Stephan Wallace  MRN:[id number]  Encounter Date: 2/21/2025    Diagnosis:   Encounter Diagnosis   Name Primary?    Functional movement disorder Yes       Referring provider: Aleksandra Rodriguez NP    Physician Orders: eval and treat; [facility]  Medical Diagnosis: F44.4 (ICD-10-CM) - Functional neurological symptom disorder with abnormal movement R53.1,Z74.09,R68.89 (ICD-10-CM) - Decreased strength, endurance, and mobility R53.83 (ICD-10-CM) - Fatigue, unspecified type  Evaluation Date: 1/10/2025  Insurance Authorization Period Expiration: 12/31/2025  Plan of Care Certification Period: 4/21/2025  Visit # / Visits authorized: 2/10, plus eval  FOTO completed: 1/3     Precautions:  Standard and Fall    Time In: 1330  Time Out: 1430  Total Billable Time: 60 minutes    SUBJECTIVE     She reports significant emotional sadness at recent passing of dog. Also shared about impact of family's grief on her own grief. States that has some options for temporary housing while in [facility]. Stated that she tends to favor self-destructive coping for pain and grief.  Noted surprise at ability to care for her family members during mourning of dog's death.     Stephan was compliant with parts of home exercise program given previously. Writing therapist reminded Stephan of importance of completion of exercises and activities outside of session to attain goals.     Response to previous treatment: Ms. Wallace noted: it was a hard day mentally.     Functional change: n/a    Pain:       Pain Related Behaviors Observed: yes; currently rating pain as 6/10  Functional Pain Scale Rating 0-10: within the last 24 hours  Date 1/10/2025 2/17/2025   Average 5/10 5/10   Worst 7/10 8/10   Best 1/10 3/10   Composite score 4.33/10 5.33/10   [JESUS  is 1 point or 15-20% change in interference composite score (Cleo et al. 2008)]     Location: head/face,  mouth, B hand    Description: fatigue, exhaustion  Aggravating Factors: anxiety, depression, stress    Easing Factors: anxiety meds     Patient Specific Activities based on Personal goals:  Activity  2025    Performance (P) Satisfaction (S)   Driving 5 2   2.  Engaging in Taoism activities 5 4   3.  Engaging in activities outside of work 5 4   4.  Walking (grocery store, in the park/festival 7 3   5.  Chores 6 2           Total Score  5.6 5    Ratin-10; unable/unsatisfied - Able/Satisfied     Objective     Objective Measures updated at progress report unless specified.    COGNITIVE Exam  Behaviors: pleasant, cooperative. Movements/tics throughout session, in face, UE. Reports difficulties initiation.  Memory: brain fog. Difficulty multitasking. Not tested during eval.  Safety awareness/insight to disability: impaired judgment and impaired insight; pushes through at work, unable to do this outside work.   Coping skills/emotional control: listening to music. During session anxious, but participating.     Visual/Perceptual:  Tracking: attempted to test; difficult tracking due to movements affecting maintaining eye on target.  Saccades: not tested  Acuity: wears glasses  Convergence: impaired, with decreased ability to accommodate.     Dominant hand: right     Active Range of Motion  Upper Extremity 1/10/2025    RUE LUE   Hands WFL WFL   Wrist WFL WFL   Elbows WFL WFL   Shoulders WFL WFL      Upper Extremity Strength - Manual Muscle Testing       Motion Tested 1/10/2025    Right Left    Shoulder Flexion 4+/5 5/5   Shoulder Extension 5/5 5/5   Shoulder Abduction 5/5 5/5   Shoulder IR 5/5 5/5   Shoulder ER 4/5 4+/5   Elbow Flexion 5/5 5/5   Elbow Extension 5/5 5/5       Strength (in pounds, rung II, average of three trials) - NT (tool being calibrated)     Functional Strength  Pile Testing: Lifting    1/10/2025 (lbs/HR/CHIOMA)     Repetitive Floor to Waist NT due to time    Repetitive Waist to Shoulder     1-  Time Maximum      Carry-2 Handed (40ft)      Work demand Level   TBD    Reason for stop point      comment Increased frustration reported after instruction given. Educated in [employer].    The work demand level listed above is based on the physical performance screening test performed. More comprehensive physical testing integrated with clinical findings would be required to derived an accurate work capacity.      Balance  5 times sit-stand (adults 18-63 y/o) 1/10/2025   >12 sec= fall risk for general elderly  >16 sec= fall risk for Parkinson's disease  >10 sec= balance/vestibular dysfunction (<59 y/o)  >14.2 sec= balance/vestibular dysfunction (>59 y/o)  >12 sec= fall risk for CVA 13.47 seconds     (without UE used to push up from chair)      Endurance Testing: Modified Ramp Treadmill Test    1/10/2025   Minutes Completed  3 minutes 5 seconds   % Grades  10 %   Speed (mph)  2.1 mph attempted   METS 4    bpm   Panchito Rating Perceived Exertion Scale   5   Reason for stop point Fatigue, Decline in maintaining pace safely , Increased discomfort   Comments Reports achilles pain left         Limitation/Restriction for FOTO NOC Survey     Therapist reviewed FOTO scores for Chazmin D Encalade on 1/10/2025.   FOTO documents entered into [employer] - see Media section.     Limitation Score: 40%                 Treatment     Stephan received the treatments listed below:     Therapeutic activities and functional lifting activities in order to increase participation in, endurance for, and performance with vocational, selfcare ADLs, and leisure activities in order to improve quality of life, for 60 minutes, including:    Seated crafting task. Pt completed seated FMC task with verbal/tactile cues on support upright posture. Modifications suggested for height and distance from task. Pt tolerated well. Rated as helpful and moderate 3/10. Seated on physioball. While crafting, education and discussion of pain science intro, danger vs  safety messaging, hurt vs harm. Pt rated education as relatable and 3/10 moderate. Endorsed improved anxiety management as session progressed.     No environmental, cultural, spiritual, developmental or education needs expressed or noted.    Patient Education and Home Exercises   Education provided:   - Progress towards goals   - importance of completion of exercises and activities outside of session to attain goals.   - proper posture and body mechanics.  - anticipated muscular soreness following lift testing and strategies for management including stretching, using ice, scheduled rest.  - Functional pain scale  - CHIOMA rate of perceived exertion scale.   - pain cycle  - pursed lip and diaphragmatic breathing techniques  - details of the Functional Restoration Program  - palmi  - options for PNS activation: humming, singing out loud.   - hurt vs harm  - safety in me messaging vs danger in me     Written Home Exercises Provided: Patient instructed to cont prior HEP.  Exercises were reviewed and Stephan was able to demonstrate them prior to the end of the session. Betitoroderick demonstrated good  understanding of the education provided.     See EMR under Patient Instructions for exercises provided during therapy sessions. Patient education also located in FRP binder provided on day 1 of the cohort.     Assessment     Ms. Wallace presented emotionally elevated, endorsed difficulty managing grief at passing of dog. Engaged well in crafting task, endorsed some relief in feelings of anxiety and pain with task, noted to have decreased tearfulness and improved emotional regulation as session progressed.   Stephan with good comprehension of education provided. In rating personal goals for performance and satisfaction, Stephan stated that goals remain appropriate.     Stephan is progressing well towards her goals and there are no updates to goals at this time. Stephan's prognosis is good, due to motivation, if able to commit to  therapy.  Stephan would continue to benefit from skilled outpatient occupational therapy to address the deficits listed in the problem list on initial evaluation, provide patient education, and to maximize patient's level of independence in the home and community environment.     Anticipated barriers to occupational therapy: not yet identified    Goals:     SHORT Term goals: In 3 weeks, patient will:  Status of goal:   Implements correct use of breathing techniques during functional lifting tasks, with minimal cues needed. Initiated   Utilizes CHIOMA rate of exertion scale to pace performance with functional lifting tasks, and implements self-care strategies during activity participation to manage pain. Initiated   Verbalize understanding of energy conservation and pacing strategies during daily habits, roles, and routines in order to improve tolerance for work and home demands.  Initiated   Completes 5x sit to stand test in 12 seconds or less to improve ability to participate in community mobility.   Initiated   Demonstrate increased positional tolerance to the level needed for work, home, and community activities (standing in cue at social event, managing supplies for outing, streneous IADL), as evidenced by having a METS level of 5. Initiated   Demonstrate proper body mechanics with functional lifting tasks (floor to waist, waist to shoulder, maximum lift, and box carry), via teach back method with minimal cues needed. Initiated   Demonstrate proper sequencing  when lifting waist to shoulder for management of (I)ADL, including dressing and grooming, placing items in kitchen cabinets, folding towels, and/or managing suitcase when traveling.   Initiated   Demonstrate proper sequencing when lifting floor to waist to meet demand of work/home routine, including lifting groceries, managing laundry, car parts, and/or managing suitcase when traveling.   Initiated   Tolerate Home Activity Plan (HAP)/ Home Exercise Program  (HEP) to promote safety and independence with ADL, with report of completion of at least 2 out of 4 days outside of program participation.  Initiated   Show improved perception of own abilities as evidenced by increase of FOTO scores to established MDC value and perception of performance ratings regarding personal long term goals.  Initiated         Long Term goals: In 9 weeks, patient will: Status of goal:   Report implementation of application of mindfulness, stretching, and other coping strategies for improved participation in daily routine. Initiated   Verbalize functional application of lifting techniques in daily life (golfers lift, floor to waist, waist to shoulder). Initiated   Completes 5x sit to stand test in 9 seconds or less to improve ability to participate in community mobility.  Initiated   Applies functional application of lifting techniques (golfer's lift, floor to waist, waist to shoulder) in activities of daily life, per patient report.  Initiated   Demonstrate physical capacities consistent with a Medium (#50 max, frequent lifting/carrying #25, 3.5 METS)  demand level, as needed to perform activities to be successful in roles of life, regarding Full Time Employment and Household Management. Initiated   Have an improvement of 3 points in 2/5 personal goals in rating of  performance.  Initiated   Show improved perception of own abilities as evidenced by increase of FOTO scores to established MC II value and perception of performance ratings regarding personal long term goals.  Initiated      Patient Specific Goals; to be met after 2 month of (I) application of tools/techniques learned.  Vocational:  at work   Recreational: engaging in hobby, playing guitar, Temple activities   Daily Living Activities: driving, chores   Measured by patient's self-reported performance and satisfaction of personal FRP goals, listed above in subjective section.   Total Score = sum of the activity performance  scores / number of activities  Minimum detectable change (90%CI) for average score = 2 points  Minimum detectable change (90%CI) for single activity score  = 3 points    Plan     Continue per plan of care.     Updates/Grading for next session: progress as tolerated, take  measures, into to functional lifts     MAYRA Mccain   2/21/2025

## 2025-02-21 NOTE — PROGRESS NOTES
[facility]    Functional Restoration Program  Physical Therapy Treatment Note  FRP Day 3    Name: Stehpan Wallace  MRN:[id number]      Therapy Diagnosis:   Encounter Diagnosis   Name Primary?    Decreased functional activity tolerance Yes       Physician: Aleksandra Rodriguez NP    Date: 2/21/2025        Time In: 2:30  Time Out: 3:30  Total Billable Time: 55 minutes    SUBJECTIVE       Stephan reports still emotional about family dog and it is impacting how she is physically feeling today.       Current 6/10  Location(s): facial symptoms    OBJECTIVE     Objective Measures updated at progress report unless specified.     Treatment       Stephan received the Individualized Treatments listed below:     Stephan participated in dynamic functional therapeutic activities to improve functional performance for 40  minutes, including:    Diaphragmatic breathing:   Verbal cues for unlabored, long & relaxed breathing w/ increased time for exhalation  Awareness of pulling breath down away from mouth to hips  Cues for for proper abdominal excursion & decreased use of accessory muscles of breathing (hand on stomach & on chest; increased stomach motion, decreased chest motion)  Education on inhalation activating sympathetic nervous system   Education on exhalation activating parasympathetic nervous system  Performed supine & seated      Pt Education:  Resisted Exercise Basics  Modified Panchito scale  Weekly topics: motion is lotion  Postural awareness during resisted ex and functional mobility    Functional Activity Endurance   DATE ACTIVITY DURATION DETAILS OTHER/PRE   2/21/25 Sci[employer] 12 min Level 1 3/10                                 Stephan participated in neuromuscular re-education activities to improve: Balance, Coordination, Kinesthetic, Sense, Proprioception, and Posture for 20 minutes. The following activities were included:    Fitter Board     PRE 4/10   Lat   Ant/post    Peripheral muscle  "strengthening which included 1-2 sets of 10-20 repetitions at a slow, controlled 5-7 second per rep pace focused on strengthening supporting musculature for improved body mechanics & functional mobility.  Patient & therapist focused on proper form & speed during treatment to ensure optimal strengthening of each targeted muscle group & neuromuscular re-education. Patients are instructed to keep sets/reps with proper load to stay at 3-5 out of 10 on the provided modified Panchito exertion scale.    BATCA Machine Exercises Weight (lbs) Sets Repetitions Panchito Exertion Scale Rating   Leg Extension  7.5  15 5   Hamstring Curls 12.5  20 4   Chest Press 15  20 4   Pec Fly 17.5  20 4.5   Lat Pull Down       Mid Row       Bicep Bar Curls       Standing Tricep Extension       Single Leg Press  R/L       *tightness    Patient Education and Home Exercises     Home Exercises Provided and Patient Education Provided     Education provided:   - FRP Educational Topics: Modified Panchito Exertion Scale, Physical & Psychological Pain Cycles, Z-Lying for Pain Relief/Relaxation, Diaphragmatic Breathing, and Resisted Exercise Basics    Written Home Exercises Provided: Patient instructed to cont prior HEP. Exercises were reviewed and Stephan was able to demonstrate them prior to the end of the session.  Nancyluizaroderick demonstrated good  understanding of the education provided. See EMR under Patient Instructions for information provided during therapy sessions    ASSESSMENT     Nancyalberto participated well PT. with goo d understanding of initial tenets of FRP.  Education of the Jin scale and Exercise Basics to assist with improving her therapeutic activity and BATCA resisted the exercises in the FRP program. Review of the physical/psychological pain cycles, and how FRP activities with pacing will help break the cycle. Pt was able to understand "hurt vs harm" and  "boom bust" in relation to activity and activity avoidence. Pt educated on difference of SNS " and PNS and different ways to activate the calming NSRuthie Rothman Is progressing well towards her goals.   Pt prognosis is Good.     Pt will continue to benefit from skilled outpatient physical therapy to address the deficits listed in the problem list box on initial evaluation, provide pt/family education and to maximize pt's level of independence in the home and community environment.     Pt's spiritual, cultural and educational needs considered and pt agreeable to plan of care and goals.     Anticipated Barriers for therapy: chronic nature of issues, psychosocial factors, central sensitization    GOALS: Pt is in agreement with the following goals:  Goal Progress Towards Goal   SHORT Term: In 3 weeks, pt will:     Verbalize & demonstrate good understanding of resisted training with 3-1-3 second rep for nalgivjkvl-afomkldor-rzjftawzz contraction to encourage full muscle recruitment for strength & endurance. Initiated 1/10/2025   Verbalize & demonstrate good understanding of home stretch routine to improve AROM, help decrease pain & improve mobility. Initiated 1/10/2025   Demonstrate proper supine or seated diaphragmatic breathing with decreased chest excursion & emphasis on full abdominal excursion & increased expiration time to promote muscle relaxation & increased parasympathetic activity to improve patient tolerance to activities. Initiated 1/10/2025   Verbalize good understanding of importance of proper posture in resisted exercise, functional activities & ADL's in order to help reduce postural strain, promote proper breathing. Initiated 1/10/2025   Demonstrate good understanding of full body resisted exercises w/ use of pictures &/or verbal cues to promote independence w/ exercise at the end of the program. Initiated 1/10/2025   Verbalize plan for continuing resisted exercises w/ specific location (i.e. commercial gym, home, community fitness center)  to incorporate all major muscle groups to maintain &  "progress therapeutic functional improvements. Initiated 1/10/2025   Verbalize difference between  "hurt vs harm"  to demonstrate understanding of fear avoidance in pain cycle.  Initiated 1/10/2025         Midterm: In 8 weeks, pt will:     Verbalize good understanding of activities planning/scheduling (stretching, mindfulness, resisted training, & cardio) prescribed by PT/OT following the end of the program to sustain & progress functional improvements. Initiated 1/10/2025   Perform selected resisted training w/ minimal to no cuing in therapy session to be independent w/ resisted strengthening. Initiated 1/10/2025   Demonstrate improved symptom self-management w/ posture/positioning and mechanical movements and/or implementation of appropriate modalities throughout a typical day.  Initiated 1/10/2025   Demonstrate independence w/ home stretch routine to improve AROM, help decrease pain & improve mobility. Initiated 1/10/2025         Long Term: In 12 weeks, pt will:     Perform selected cardio & resisted training independently to continue safe regular performance of daily exercise. Initiated 1/10/2025   Improve  6 MWT to 395 meters or greater to improve gait speed and mobility. Initiated 1/10/2025   Perform LLE single leg stance 20 seconds or greater bilaterally to improve safety and balance in ADLs. Initiated 1/10/2025   Demonstrate Timed Up & Go (with appropriate assistive device, if necessary) of 8 seconds or less to decrease fall risk and improve functional mobility. Initiated 1/10/2025   Score 70% or more on ANKLE FOTO to indicate significant functional improvements in impaired functions secondary to pain. Initiated 1/10/2025     Initiated 1/10/2025           PLAN     Continue PT per POC     Yelena Forrest PTA              "

## 2025-02-24 ENCOUNTER — CLINICAL SUPPORT (OUTPATIENT)
Dept: REHABILITATION | Facility: OTHER | Age: 35
End: 2025-02-24
Payer: COMMERCIAL

## 2025-02-24 ENCOUNTER — CLINICAL SUPPORT (OUTPATIENT)
Dept: PSYCHIATRY | Facility: OTHER | Age: 35
End: 2025-02-24
Payer: COMMERCIAL

## 2025-02-24 DIAGNOSIS — F41.1 GENERALIZED ANXIETY DISORDER WITH PANIC ATTACKS: ICD-10-CM

## 2025-02-24 DIAGNOSIS — F41.0 GENERALIZED ANXIETY DISORDER WITH PANIC ATTACKS: ICD-10-CM

## 2025-02-24 DIAGNOSIS — F44.4 FUNCTIONAL NEUROLOGICAL SYMPTOM DISORDER WITH ABNORMAL MOVEMENT: Primary | ICD-10-CM

## 2025-02-24 DIAGNOSIS — G25.9 FUNCTIONAL MOVEMENT DISORDER: Primary | ICD-10-CM

## 2025-02-24 DIAGNOSIS — R68.89 DECREASED FUNCTIONAL ACTIVITY TOLERANCE: Primary | ICD-10-CM

## 2025-02-24 DIAGNOSIS — R68.89 DECREASED STRENGTH, ENDURANCE, AND MOBILITY: ICD-10-CM

## 2025-02-24 DIAGNOSIS — R68.89 DECREASED FUNCTIONAL ACTIVITY TOLERANCE: ICD-10-CM

## 2025-02-24 DIAGNOSIS — Z74.09 DECREASED STRENGTH, ENDURANCE, AND MOBILITY: ICD-10-CM

## 2025-02-24 DIAGNOSIS — R53.83 FATIGUE, UNSPECIFIED TYPE: ICD-10-CM

## 2025-02-24 DIAGNOSIS — R53.1 DECREASED STRENGTH, ENDURANCE, AND MOBILITY: ICD-10-CM

## 2025-02-24 PROCEDURE — 97530 THERAPEUTIC ACTIVITIES: CPT | Mod: CQ

## 2025-02-24 PROCEDURE — 97530 THERAPEUTIC ACTIVITIES: CPT

## 2025-02-24 PROCEDURE — 97112 NEUROMUSCULAR REEDUCATION: CPT | Mod: CQ

## 2025-02-24 NOTE — PROGRESS NOTES
Group Psychotherapy    Site: [facility]    Clinical status of patient: Outpatient    2/24/2025    Length of service:[id number]    Referred by: Functional Restoration Program     Number of patients in attendance: 5    Target symptoms: Chronic Pain Management     Patient's response to intervention:  The patient's response to intervention is active listening.    Progress toward goals and other mental status changes:  The patient's progress toward goals is good.    Plan: group psychotherapy    Topics Covered: Pt attended CBT for Chronic Pain as part of their participation in Functional Restoration. Topics discussed today included The Autonomic nervous System and how it relates to chronic pain, depression, anxiety and other mood disorders.

## 2025-02-25 NOTE — PROGRESS NOTES
I have met with the above student and agree with the assessment and note written. I was present for this encounter. Any changes have been made and authorized by me.  Zeynep Reyes, hospitalsW-BACS 02/25/2025 10:46 AM

## 2025-02-25 NOTE — PROGRESS NOTES
[facility]  Functional Restoration Program  Occupational Therapy Treatment Note  [facility] Day 3    Name: Stephan Wallace  MRN:[id number]  Encounter Date: 2/24/2025    Diagnosis:   Encounter Diagnosis   Name Primary?    Functional movement disorder Yes       Referring provider: Aleksandra Rodriguez NP    Physician Orders: eval and treat; [facility]  Medical Diagnosis: F44.4 (ICD-10-CM) - Functional neurological symptom disorder with abnormal movement R53.1,Z74.09,R68.89 (ICD-10-CM) - Decreased strength, endurance, and mobility R53.83 (ICD-10-CM) - Fatigue, unspecified type  Evaluation Date: 1/10/2025  Insurance Authorization Period Expiration: 12/31/2025  Plan of Care Certification Period: 4/21/2025  Visit # / Visits authorized: 2/10, plus eval  FOTO completed: 1/3     Precautions:  Standard and Fall    Time In: 1330  Time Out: 1430  Total Billable Time: 60 minutes    SUBJECTIVE     She reports significant emotional sadness at recent passing of dog. Also shared about impact of family's grief on her own grief. States that has some options for temporary housing while in [facility]. Stated that she tends to favor self-destructive coping for pain and grief.  Noted surprise at ability to care for her family members during mourning of dog's death.     Stephan was compliant with parts of home exercise program given previously. Writing therapist reminded Stephan of importance of completion of exercises and activities outside of session to attain goals.     Response to previous treatment: Ms. Wallace noted: it was a hard day mentally.     Functional change: n/a    Pain:       Pain Related Behaviors Observed: yes; currently rating pain as 6/10  Functional Pain Scale Rating 0-10: within the last 24 hours  Date 1/10/2025 2/17/2025   Average 5/10 5/10   Worst 7/10 8/10   Best 1/10 3/10   Composite score 4.33/10 5.33/10   [JESUS  is 1 point or 15-20% change in interference composite score (Cleo et al. 2008)]     Location: head/face,  mouth, B hand    Description: fatigue, exhaustion  Aggravating Factors: anxiety, depression, stress    Easing Factors: anxiety meds     Patient Specific Activities based on Personal goals:  Activity  2025    Performance (P) Satisfaction (S)   Driving 5 2   2.  Engaging in [employer] activities 5 4   3.  Engaging in activities outside of work 5 4   4.  Walking (grocery store, in the park/festival 7 3   5.  Chores 6 2           Total Score  5.6 5    Ratin-10; unable/unsatisfied - Able/Satisfied     Objective     Objective Measures updated at progress report unless specified.    COGNITIVE Exam  Behaviors: pleasant, cooperative. Movements/tics throughout session, in face, UE. Reports difficulties initiation.  Memory: brain fog. Difficulty multitasking. Not tested during eval.  Safety awareness/insight to disability: impaired judgment and impaired insight; pushes through at work, unable to do this outside work.   Coping skills/emotional control: listening to music. During session anxious, but participating.     Visual/Perceptual:  Tracking: attempted to test; difficult tracking due to movements affecting maintaining eye on target.  Saccades: not tested  Acuity: wears glasses  Convergence: impaired, with decreased ability to accommodate.     Dominant hand: right     Active Range of Motion  Upper Extremity 1/10/2025    RUE LUE   Hands WFL WFL   Wrist WFL WFL   Elbows WFL WFL   Shoulders WFL WFL      Upper Extremity Strength - Manual Muscle Testing       Motion Tested 1/10/2025    Right Left    Shoulder Flexion 4+/5 5/5   Shoulder Extension 5/5 5/5   Shoulder Abduction 5/5 5/5   Shoulder IR 5/5 5/5   Shoulder ER 4/5 4+/5   Elbow Flexion 5/5 5/5   Elbow Extension 5/5 5/5       Strength (in pounds, rung II, average of three trials) - NT (tool being calibrated)     Functional Strength  Pile Testing: Lifting    1/10/2025 (lbs/HR/CHIOMA)     Repetitive Floor to Waist NT due to time    Repetitive Waist to Shoulder     1-  Time Maximum      Carry-2 Handed (40ft)      Work demand Level   TBD    Reason for stop point      comment Increased frustration reported after instruction given. Educated in "[employer].    The work demand level listed above is based on the physical performance screening test performed. More comprehensive physical testing integrated with clinical findings would be required to derived an accurate work capacity.      Balance  5 times sit-stand (adults 18-63 y/o) 1/10/2025   >12 sec= fall risk for general elderly  >16 sec= fall risk for Parkinson's disease  >10 sec= balance/vestibular dysfunction (<59 y/o)  >14.2 sec= balance/vestibular dysfunction (>59 y/o)  >12 sec= fall risk for CVA 13.47 seconds     (without UE used to push up from chair)      Endurance Testing: Modified Ramp Treadmill Test    1/10/2025   Minutes Completed  3 minutes 5 seconds   % Grades  10 %   Speed (mph)  2.1 mph attempted   METS 4    bpm   Panchito Rating Perceived Exertion Scale   5   Reason for stop point Fatigue, Decline in maintaining pace safely , Increased discomfort   Comments Reports achilles pain left         Limitation/Restriction for FOTO NOC Survey     Therapist reviewed FOTO scores for Chazmin D Encalade on 1/10/2025.   FOTO documents entered into [employer] - see Media section.     Limitation Score: 40%                 Treatment     Stephan received the treatments listed below:     Therapeutic activities and functional lifting activities in order to increase participation in, endurance for, and performance with vocational, selfcare ADLs, and leisure activities in order to improve quality of life, for 60 minutes, including:    Initiation of stretch routine, noted to demonstrate onset of restless legs and holding breath.  Transitioned to seated on physioball, completed head and neck exercises below.    Full body functional mobility w/ 3-10 sec hold technique &/or 3-5 repetition technique to improve functional performance. In order  to:  Improve driving safety, visual & spatial awareness:  Cervical flx/ext  Cervical side bending  Cervical rotation  Cervical protraction/retraction  Improve lifting mechanics, showering/bathing, dressing, cooking, reaching, pushing & fine motor skills  Shld rolls  Shld depression/elevation    Onset of R arm extension and wrist extension, digits flexed. Limited control of breath, requiring max effort by patient to regulate breath. Rated cued breathing as helpful, but difficult 6/10.    After episode regulated, aprx 30 minutes, transitioned to turning around to face table.   Completed seated theraputty exercise.  Pt completed seated FMC task with verbal/tactile cues on support upright posture. Modifications suggested for height and distance from task. Pt tolerated well. Rated as helpful and moderate 3/10. Seated on physioball. While crafting, education and discussion of pain science intro, danger vs safety messaging, hurt vs harm. Pt rated education as relatable and 3/10 moderate. Endorsed improved anxiety management as session progressed.     No environmental, cultural, spiritual, developmental or education needs expressed or noted.    Patient Education and Home Exercises   Education provided:   - Progress towards goals   - importance of completion of exercises and activities outside of session to attain goals.   - proper posture and body mechanics.  - anticipated muscular soreness following lift testing and strategies for management including stretching, using ice, scheduled rest.  - Functional pain scale  - CHIOMA rate of perceived exertion scale.   - pain cycle  - pursed lip and diaphragmatic breathing techniques  - details of the Functional Restoration Program  - palmi  - options for PNS activation: humming, singing out loud.   - hurt vs harm  - safety in me messaging vs danger in me     Written Home Exercises Provided: Patient instructed to cont prior HEP.  Exercises were reviewed and Stephan was able to  demonstrate them prior to the end of the session. Stephan demonstrated good  understanding of the education provided.     See EMR under Patient Instructions for exercises provided during therapy sessions. Patient education also located in FRP binder provided on day 1 of the cohort.     Assessment     Ms. Wallace presented emotionally elevated, onset of physical tension noted with stretch routine, noted to demonstrate frustration with onset of episode, benefiting from redirection and mindful awareness of breath. Endorsed desire to continue developing tools for managing awareness and PNS activation.     Stephan is progressing well towards her goals and there are no updates to goals at this time. Stephan's prognosis is good, due to motivation, if able to commit to therapy.  Stephan would continue to benefit from skilled outpatient occupational therapy to address the deficits listed in the problem list on initial evaluation, provide patient education, and to maximize patient's level of independence in the home and community environment.     Anticipated barriers to occupational therapy: not yet identified    Goals:     SHORT Term goals: In 3 weeks, patient will:  Status of goal:   Implements correct use of breathing techniques during functional lifting tasks, with minimal cues needed. Initiated   Utilizes CHIOMA rate of exertion scale to pace performance with functional lifting tasks, and implements self-care strategies during activity participation to manage pain. Initiated   Verbalize understanding of energy conservation and pacing strategies during daily habits, roles, and routines in order to improve tolerance for work and home demands.  Initiated   Completes 5x sit to stand test in 12 seconds or less to improve ability to participate in community mobility.   Initiated   Demonstrate increased positional tolerance to the level needed for work, home, and community activities (standing in cue at social event, managing  supplies for outing, streneous IADL), as evidenced by having a METS level of 5. Initiated   Demonstrate proper body mechanics with functional lifting tasks (floor to waist, waist to shoulder, maximum lift, and box carry), via teach back method with minimal cues needed. Initiated   Demonstrate proper sequencing  when lifting waist to shoulder for management of (I)ADL, including dressing and grooming, placing items in kitchen cabinets, folding towels, and/or managing suitcase when traveling.   Initiated   Demonstrate proper sequencing when lifting floor to waist to meet demand of work/home routine, including lifting groceries, managing laundry, car parts, and/or managing suitcase when traveling.   Initiated   Tolerate Home Activity Plan (HAP)/ Home Exercise Program (HEP) to promote safety and independence with ADL, with report of completion of at least 2 out of 4 days outside of program participation.  Initiated   Show improved perception of own abilities as evidenced by increase of FOTO scores to established MDC value and perception of performance ratings regarding personal long term goals.  Initiated         Long Term goals: In 9 weeks, patient will: Status of goal:   Report implementation of application of mindfulness, stretching, and other coping strategies for improved participation in daily routine. Initiated   Verbalize functional application of lifting techniques in daily life (golfers lift, floor to waist, waist to shoulder). Initiated   Completes 5x sit to stand test in 9 seconds or less to improve ability to participate in community mobility.  Initiated   Applies functional application of lifting techniques (golfer's lift, floor to waist, waist to shoulder) in activities of daily life, per patient report.  Initiated   Demonstrate physical capacities consistent with a Medium (#50 max, frequent lifting/carrying #25, 3.5 METS)  demand level, as needed to perform activities to be successful in roles of life,  regarding Full Time Employment and Household Management. Initiated   Have an improvement of 3 points in 2/5 personal goals in rating of  performance.  Initiated   Show improved perception of own abilities as evidenced by increase of FOTO scores to established MC II value and perception of performance ratings regarding personal long term goals.  Initiated      Patient Specific Goals; to be met after 2 month of (I) application of tools/techniques learned.  Vocational:  at work   Recreational: engaging in hobby, playing [employer], Judaism activities   Daily Living Activities: driving, chores   Measured by patient's self-reported performance and satisfaction of personal FRP goals, listed above in subjective section.   Total Score = sum of the activity performance scores / number of activities  Minimum detectable change (90%CI) for average score = 2 points  Minimum detectable change (90%CI) for single activity score  = 3 points    Plan     Continue per plan of care.     Updates/Grading for next session: progress as tolerated, take  measures, into to functional lifts     MAYRA Mccain   2/25/2025

## 2025-02-25 NOTE — PROGRESS NOTES
I have met with the above student and agree with the assessment and note written. I was present for this encounter. Any changes have been made and authorized by me.  Zeynep Reyes, [facility] 02/25/2025 10:58 AM

## 2025-02-25 NOTE — PROGRESS NOTES
I have met with the above student and agree with the assessment and note written. I was present for this encounter. Any changes have been made and authorized by me.  Zeynep Reyes, [facility] 02/25/2025 10:26 AM

## 2025-02-26 ENCOUNTER — CLINICAL SUPPORT (OUTPATIENT)
Dept: REHABILITATION | Facility: OTHER | Age: 35
End: 2025-02-26
Payer: COMMERCIAL

## 2025-02-26 ENCOUNTER — CLINICAL SUPPORT (OUTPATIENT)
Dept: PSYCHIATRY | Facility: OTHER | Age: 35
End: 2025-02-26
Payer: COMMERCIAL

## 2025-02-26 DIAGNOSIS — R68.89 DECREASED FUNCTIONAL ACTIVITY TOLERANCE: ICD-10-CM

## 2025-02-26 DIAGNOSIS — R53.1 DECREASED STRENGTH, ENDURANCE, AND MOBILITY: ICD-10-CM

## 2025-02-26 DIAGNOSIS — Z74.09 DECREASED STRENGTH, ENDURANCE, AND MOBILITY: ICD-10-CM

## 2025-02-26 DIAGNOSIS — R68.89 DECREASED FUNCTIONAL ACTIVITY TOLERANCE: Primary | ICD-10-CM

## 2025-02-26 DIAGNOSIS — F41.0 GENERALIZED ANXIETY DISORDER WITH PANIC ATTACKS: Primary | ICD-10-CM

## 2025-02-26 DIAGNOSIS — R53.83 FATIGUE, UNSPECIFIED TYPE: ICD-10-CM

## 2025-02-26 DIAGNOSIS — G25.9 FUNCTIONAL MOVEMENT DISORDER: Primary | ICD-10-CM

## 2025-02-26 DIAGNOSIS — F41.1 GENERALIZED ANXIETY DISORDER WITH PANIC ATTACKS: Primary | ICD-10-CM

## 2025-02-26 DIAGNOSIS — F44.4 FUNCTIONAL NEUROLOGICAL SYMPTOM DISORDER WITH ABNORMAL MOVEMENT: ICD-10-CM

## 2025-02-26 DIAGNOSIS — R68.89 DECREASED STRENGTH, ENDURANCE, AND MOBILITY: ICD-10-CM

## 2025-02-26 PROCEDURE — 90853 GROUP PSYCHOTHERAPY: CPT | Mod: ,,, | Performed by: SOCIAL WORKER

## 2025-02-26 PROCEDURE — 97530 THERAPEUTIC ACTIVITIES: CPT | Mod: CQ

## 2025-02-26 PROCEDURE — 97530 THERAPEUTIC ACTIVITIES: CPT

## 2025-02-26 NOTE — PROGRESS NOTES
Group Psychotherapy    Site: [facility]    Clinical status of patient: Outpatient    2/26/2025    Length of service:[id number]    Referred by: Functional Restoration Program     Number of patients in attendance: 5    Target symptoms: Chronic Pain Management     Patient's response to intervention:  The patient's response to intervention is active listening.    Progress toward goals and other mental status changes:  The patient's progress toward goals is fair .    Plan: group psychotherapy    Topics Covered: Pt attended CBT for Chronic Pain as part of their participation in Functional Restoration. Topics discussed today included nutrition habits as they relate to chronic pain, anxiety, and depression.

## 2025-02-26 NOTE — PROGRESS NOTES
"[facility]  Functional Restoration Program  Occupational Therapy Treatment Note  [facility] Day 4    Name: Stephan Wallace  MRN:[id number]  Encounter Date: 2025    Diagnosis:   Encounter Diagnosis   Name Primary?    Functional movement disorder Yes     Referring provider: Aleksandra Rodriguez NP    Physician Orders: eval and treat; [facility]  Medical Diagnosis:   F44.4 (ICD-10-CM) - Functional neurological symptom disorder with abnormal movement   R53.1,Z74.09,R68.89 (ICD-10-CM) - Decreased strength, endurance, and mobility  R53.83 (ICD-10-CM) - Fatigue, unspecified type  Evaluation Date: 1/10/2025  Insurance Authorization Period Expiration: 2025  Plan of Care Certification Period: 2025  Visit # / Visits authorized: 3/10, plus eval  FOTO completed: 1/3     Precautions:  Standard and Fall    Time In: 14:35  Time Out: 15:32  Total Billable Time: 57 minutes    SUBJECTIVE     She stated: "I feel weird doing this. My body is weird". "I am tired all the time".    Stephan was compliant with parts of home exercise program given previously.     Response to previous treatment: Ms. Wallace noted: fatigue    Functional change: not yet reported     Pain:       Currently rating pain as 4/10  Location: head/face, mouth, B hand    Description: fatigue, exhaustion    Patient Specific Activities based on Personal goals:  Activity  2025    Performance (P) Satisfaction (S)   Driving 5 2   2.  Engaging in Jewish activities 5 4   3.  Engaging in activities outside of work 5 4   4.  Walking (grocery store, in the park/festival 7 3   5.  Chores 6 2           Total Score  5.6 5    Ratin-10; unable/unsatisfied - Able/Satisfied     Objective     Objective Measures updated at progress report unless specified.    COGNITIVE Exam  Behaviors: pleasant, cooperative. Movements/tics throughout session, in face, UE. Reports difficulties initiation.  Memory: brain fog. Difficulty multitasking. Not tested during eval.  Safety " awareness/insight to disability: impaired judgment and impaired insight; pushes through at work, unable to do this outside work.   Coping skills/emotional control: listening to music. During session anxious, but participating.     Visual/Perceptual:  Tracking: attempted to test; difficult tracking due to movements affecting maintaining eye on target.  Saccades: not tested  Acuity: wears glasses  Convergence: impaired, with decreased ability to accommodate.     Dominant hand: right     Active Range of Motion  Upper Extremity 1/10/2025    RUE LUE   Hands WFL WFL   Wrist WFL WFL   Elbows WFL WFL   Shoulders WFL WFL      Upper Extremity Strength - Manual Muscle Testing  Motion Tested 1/10/2025    Right Left    Shoulder Flexion 4+/5 5/5   Shoulder Extension 5/5 5/5   Shoulder Abduction 5/5 5/5   Shoulder IR 5/5 5/5   Shoulder ER 4/5 4+/5   Elbow Flexion 5/5 5/5   Elbow Extension 5/5 5/5       Strength (in pounds, rung II, average of three trials) -   2/26/2025    Left 40.67 lbs   Right 36.33 lbs   [norms for women aged 35-39: R=74.1 +/-10.8; L=66.3 +/-11.7 (Ed et al, 1985)]     Functional Strength  Pile Testing: Lifting    1/10/2025 (lbs/HR/CHIOMA) 2/26/2025  (lbs/HR/CHIOMA)    Repetitive Floor to Waist NT due to time 8/nt/5   Repetitive Waist to Shoulder  18/101/4   1- Time Maximum   24/97/5   Carry-2 Handed (40ft)   NT   Work demand Level   TBD Light (#20 max, frequent lifting/carrying #10, 2.5 METS    Reason for stop point   Body mechanics, time needed for reps   comment Increased frustration reported after instruction given. Educated in for; to (do) Centers. -    The work demand level listed above is based on the physical performance screening test performed. More comprehensive physical testing integrated with clinical findings would be required to derived an accurate work capacity.      Balance  5 times sit-stand (adults 18-63 y/o) 1/10/2025   >12 sec= fall risk for general elderly  >16 sec= fall risk for Parkinson's  disease  >10 sec= balance/vestibular dysfunction (<61 y/o)  >14.2 sec= balance/vestibular dysfunction (>61 y/o)  >12 sec= fall risk for CVA 13.47 seconds     (without UE used to push up from chair)      Endurance Testing: Modified Ramp Treadmill Test    1/10/2025   Minutes Completed  3 minutes 5 seconds   % Grades  10 %   Speed (mph)  2.1 mph attempted   METS 4    bpm   Panchito Rating Perceived Exertion Scale   5   Reason for stop point Fatigue, Decline in maintaining pace safely , Increased discomfort   Comments Reports achilles pain left         Limitation/Restriction for FOTO NOC Survey     Therapist reviewed FOTO scores for Stephan ESPARZA Encalade on 2/17/2025.  FOTO documents entered into [employer] - see Media section.     Limitation Score: 40%                 Treatment     Stephan received the treatments listed below:     Therapeutic activities and functional lifting activities in order to increase participation in, endurance for, and performance with vocational, selfcare ADLs, and leisure activities in order to improve quality of life, for 57 minutes, including:    Vibrating ball wrapped in towel used for c nerve fiber stimulation; no change in symptoms noted.     In standing, ball toss; education base of support, center point of gravity reviewed. Sequence sit to stand used to help with technique. During break, observed for arm sway, not reciprocal.     Lifting using PILE testing completed.     Carry declined; naming artists while on treadmill to help change focus outward.     No environmental, cultural, spiritual, developmental or education needs expressed or noted.    Patient Education and Home Exercises   Education provided:   - Progress towards goals   - importance of completion of exercises and activities outside of session to attain goals.   - proper posture and body mechanics.  - anticipated muscular soreness following lift testing and strategies for management including stretching, using ice, scheduled  rest.  - Functional pain scale  - CHIOMA rate of perceived exertion scale.   - pain cycle  - pursed lip and diaphragmatic breathing techniques  - details of the Functional Restoration Program  - palming  - options for PNS activation: humming, singing out loud.   - hurt vs harm  - safety in me messaging vs danger in me    Written Home Exercises Provided: Patient instructed to cont prior HEP.  Exercises were reviewed and Stephan was able to demonstrate them prior to the end of the session. Stephan demonstrated good  understanding of the education provided.     See EMR under Patient Instructions for exercises provided during therapy sessions. Patient education also located in FRP binder provided on day 1 of the cohort.     Assessment     Ms. Wallace tolerated session well; open to education about importance of pacing. Tends to push through. Reported some frustration, and expressing difficulty multi-tasking, declining carry when walking and attending with brain to task, but willing to get onto treadmill. Good response to ball toss.     Stephan is progressing well towards her goals and there are no updates to goals at this time. Stephan's prognosis is good, due to motivation, if able to commit to therapy.    Stephan would continue to benefit from skilled outpatient occupational therapy to address the deficits listed in the problem list on initial evaluation, provide patient education, and to maximize patient's level of independence in the home and community environment.     Anticipated barriers to occupational therapy: level of frustrations, h/o PTSD.    Goals:     SHORT Term goals: In 3 weeks, patient will:  Status of goal:   Implements correct use of breathing techniques during functional lifting tasks, with minimal cues needed. Initiated   Utilizes CHIOMA rate of exertion scale to pace performance with functional lifting tasks, and implements self-care strategies during activity participation to manage pain. Initiated    Verbalize understanding of energy conservation and pacing strategies during daily habits, roles, and routines in order to improve tolerance for work and home demands.  Initiated   Completes 5x sit to stand test in 12 seconds or less to improve ability to participate in community mobility.   Initiated   Demonstrate increased positional tolerance to the level needed for work, home, and community activities (standing in cue at social event, managing supplies for outing, streneous IADL), as evidenced by having a METS level of 5. Initiated   Demonstrate proper body mechanics with functional lifting tasks (floor to waist, waist to shoulder, maximum lift, and box carry), via teach back method with minimal cues needed. Initiated   Demonstrate proper sequencing  when lifting waist to shoulder for management of (I)ADL, including dressing and grooming, placing items in kitchen cabinets, folding towels, and/or managing suitcase when traveling.   Initiated   Demonstrate proper sequencing when lifting floor to waist to meet demand of work/home routine, including lifting groceries, managing laundry, car parts, and/or managing suitcase when traveling.   Initiated   Tolerate Home Activity Plan (HAP)/ Home Exercise Program (HEP) to promote safety and independence with ADL, with report of completion of at least 2 out of 4 days outside of program participation.  Initiated   Show improved perception of own abilities as evidenced by increase of FOTO scores to established MDC value and perception of performance ratings regarding personal long term goals.  Initiated         Long Term goals: In 9 weeks, patient will: Status of goal:   Report implementation of application of mindfulness, stretching, and other coping strategies for improved participation in daily routine. Initiated   Verbalize functional application of lifting techniques in daily life (golfers lift, floor to waist, waist to shoulder). Initiated   Completes 5x sit to stand  test in 9 seconds or less to improve ability to participate in community mobility.  Initiated   Applies functional application of lifting techniques (golfer's lift, floor to waist, waist to shoulder) in activities of daily life, per patient report.  Initiated   Demonstrate physical capacities consistent with a Medium (#50 max, frequent lifting/carrying #25, 3.5 METS)  demand level, as needed to perform activities to be successful in roles of life, regarding Full Time Employment and Household Management. Initiated   Have an improvement of 3 points in 2/5 personal goals in rating of  performance.  Initiated   Show improved perception of own abilities as evidenced by increase of FOTO scores to established MC II value and perception of performance ratings regarding personal long term goals.  Initiated      Patient Specific Goals; to be met after 2 month of (I) application of tools/techniques learned.  Vocational:  at work   Recreational: engaging in hobby, playing guitar, Evangelical activities   Daily Living Activities: driving, chores   Measured by patient's self-reported performance and satisfaction of personal FRP goals, listed above in subjective section.   Total Score = sum of the activity performance scores / number of activities  Minimum detectable change (90%CI) for average score = 2 points  Minimum detectable change (90%CI) for single activity score  = 3 points    Plan     Continue per plan of care.     Updates/Grading for next session: progress as tolerated.    DAYO Sheehan, OTR/L, CEAS-I  2/26/2025

## 2025-02-26 NOTE — PROGRESS NOTES
[facility]    Functional Restoration Program  Physical Therapy Treatment Note  [facility] Day 4    Name: Stephan Wallace  MRN:[id number]      Therapy Diagnosis:   Encounter Diagnosis   Name Primary?    Decreased functional activity tolerance Yes         Physician: Aleksandra Rodriguez NP    Date: 2/26/2025        Time In: 2:30  Time Out: 3:30  Total Billable Time: 55 minutes    SUBJECTIVE     Stephan reports very emotional after mindfulness session bringing in her past. Pt states has difficulty with quiet self reflection and then frustration with it causing increased disabling symptoms. Pt reports when this happens she wants to be away from people. Pt walked up the stairs 2 flights to [facility].       Current 6/10  Location(s): facial symptoms    OBJECTIVE     Objective Measures updated at progress report unless specified.     Treatment       Stephan received the Individualized Treatments listed below:     Stephan participated in dynamic functional therapeutic activities to improve functional performance for 40  minutes, including:    Diaphragmatic breathing:   Verbal cues for unlabored, long & relaxed breathing w/ increased time for exhalation  Awareness of pulling breath down away from mouth to hips  Cues for for proper abdominal excursion & decreased use of accessory muscles of breathing (hand on stomach & on chest; increased stomach motion, decreased chest motion)  Education on inhalation activating sympathetic nervous system   Education on exhalation activating parasympathetic nervous system  Performed supine & seated      Pt Education:  Resisted Exercise Basics  Modified Panchito scale  Weekly topics: motion is lotion  Postural awareness during resisted ex and functional mobility    Mindfulness: Pt was given putty to work with in her hands while experiencing breathing/mindfulness activity  Mindfulness-based activity involving deep breathing, mindful awareness of the body and headspace  Used to activate  parasympathetic nervous system to decrease autonomic threat perception and thus reduce central sensitivity to pain  Patient demonstrate safe performance     Functional Activity Endurance   DATE ACTIVITY DURATION DETAILS OTHER/PRE   2/21/25 SciFit 12 min Level 1 3/10                                 Stephan participated in neuromuscular re-education activities to improve: Balance, Coordination, Kinesthetic, Sense, Proprioception, and Posture for 20 minutes. The following activities were included:    Fitter Board: 20 x2 taps             PRE 4/10   Lat - single HH, uneven taps, cues for decrease lean     Ant/post - B HH, cues for decrease for/back trunk movement, increased discomfort in achilles    Peripheral muscle strengthening which included 1-2 sets of 10-20 repetitions at a slow, controlled 5-7 second per rep pace focused on strengthening supporting musculature for improved body mechanics & functional mobility.  Patient & therapist focused on proper form & speed during treatment to ensure optimal strengthening of each targeted muscle group & neuromuscular re-education. Patients are instructed to keep sets/reps with proper load to stay at 3-5 out of 10 on the provided modified Panchito exertion scale.    BATCA Machine Exercises Weight (lbs) Sets Repetitions Panchito Exertion Scale Rating   Leg Extension        Hamstring Curls       Chest Press 17.5 1 15 4.5   Pec Fly       Lat Pull Down 17.5 1 15 5   Mid Row 20 1 15 3   Bicep Bar Curls       Standing Tricep Extension       Single Leg Press  R/L 15 1 20 4   *tightness    Patient Education and Home Exercises     Home Exercises Provided and Patient Education Provided     Education provided:   - FRP Educational Topics: Modified Panchito Exertion Scale, Physical & Psychological Pain Cycles, Z-Lying for Pain Relief/Relaxation, Diaphragmatic Breathing, and Resisted Exercise Basics    Written Home Exercises Provided: Patient instructed to cont prior HEP. Exercises were reviewed and  "Stephan was able to demonstrate them prior to the end of the session.  Stephan demonstrated good  understanding of the education provided. See EMR under Patient Instructions for information provided during therapy sessions    ASSESSMENT     Stephan participated fairly well PT session. Initially, pt required time and guidance to calm the NS. Discussion and cont practice using the breathe to activate the PNS, to use in situations causing "fight, flight and freeze. Mindfulness participation with pt actively using putty to assist with calming the NS. Pt transitioned to resisted ex on the BATCA well, using the breathe and slow controlled pace to also calm the NS. Pt also, worked on balance and proprioception with the Fitter Board with moderate discomfort.     Stephan Is progressing well towards her goals.   Pt prognosis is Good.     Pt will continue to benefit from skilled outpatient physical therapy to address the deficits listed in the problem list box on initial evaluation, provide pt/family education and to maximize pt's level of independence in the home and community environment.     Pt's spiritual, cultural and educational needs considered and pt agreeable to plan of care and goals.     Anticipated Barriers for therapy: chronic nature of issues, psychosocial factors, central sensitization    GOALS: Pt is in agreement with the following goals:  Goal Progress Towards Goal   SHORT Term: In 3 weeks, pt will:     Verbalize & demonstrate good understanding of resisted training with 3-1-3 second rep for [id number] contraction to encourage full muscle recruitment for strength & endurance. Initiated 1/10/2025   Verbalize & demonstrate good understanding of home stretch routine to improve AROM, help decrease pain & improve mobility. Initiated 1/10/2025   Demonstrate proper supine or seated diaphragmatic breathing with decreased chest excursion & emphasis on full abdominal excursion & increased expiration " "time to promote muscle relaxation & increased parasympathetic activity to improve patient tolerance to activities. Initiated 1/10/2025   Verbalize good understanding of importance of proper posture in resisted exercise, functional activities & ADL's in order to help reduce postural strain, promote proper breathing. Initiated 1/10/2025   Demonstrate good understanding of full body resisted exercises w/ use of pictures &/or verbal cues to promote independence w/ exercise at the end of the program. Initiated 1/10/2025   Verbalize plan for continuing resisted exercises w/ specific location (i.e. commercial gym, home, community fitness center)  to incorporate all major muscle groups to maintain & progress therapeutic functional improvements. Initiated 1/10/2025   Verbalize difference between  "hurt vs harm"  to demonstrate understanding of fear avoidance in pain cycle.  Initiated 1/10/2025         Midterm: In 8 weeks, pt will:     Verbalize good understanding of activities planning/scheduling (stretching, mindfulness, resisted training, & cardio) prescribed by PT/OT following the end of the program to sustain & progress functional improvements. Initiated 1/10/2025   Perform selected resisted training w/ minimal to no cuing in therapy session to be independent w/ resisted strengthening. Initiated 1/10/2025   Demonstrate improved symptom self-management w/ posture/positioning and mechanical movements and/or implementation of appropriate modalities throughout a typical day.  Initiated 1/10/2025   Demonstrate independence w/ home stretch routine to improve AROM, help decrease pain & improve mobility. Initiated 1/10/2025         Long Term: In 12 weeks, pt will:     Perform selected cardio & resisted training independently to continue safe regular performance of daily exercise. Initiated 1/10/2025   Improve  6 MWT to 395 meters or greater to improve gait speed and mobility. Initiated 1/10/2025   Perform LLE single leg stance " 20 seconds or greater bilaterally to improve safety and balance in ADLs. Initiated 1/10/2025   Demonstrate Timed Up & Go (with appropriate assistive device, if necessary) of 8 seconds or less to decrease fall risk and improve functional mobility. Initiated 1/10/2025   Score 70% or more on ANKLE FOTO to indicate significant functional improvements in impaired functions secondary to pain. Initiated 1/10/2025     Initiated 1/10/2025           PLAN     Continue PT per POC     Yelena Forrest, PTA

## 2025-02-26 NOTE — PROGRESS NOTES
"[facility]    Functional Restoration Program  Physical Therapy Treatment Note  P Day 5    Name: Stephan Wallace  MRN:[id number]      Therapy Diagnosis:   Encounter Diagnosis   Name Primary?    Decreased functional activity tolerance Yes         Physician: Aleksandra Rodriguez NP    Date: 2/26/2025      Time In: 1:40  Time Out: 2:30  Total Billable Time: 55 minutes    SUBJECTIVE     Stephan reports her symptoms are "somewhat disabling" but her fatigue puts her at a 4/10 on pain scale. Pt states she had an incident of increased symptoms (involuntary facial and UE movements and impeding speech) during the stretch routine. Pt states it makes her very frustrated and doesn't want to stand out among the group.      Current 4/10  Location(s): facial  and UE symptoms, effected speech    OBJECTIVE     Objective Measures updated at progress report unless specified.     Treatment       Stephan received the Individualized Treatments listed below:     Stephan participated in dynamic functional therapeutic activities to improve functional performance for 55 minutes, including:    Full body functional mobility w/ 3-10 sec hold technique &/or 3-5 repetition technique to improve functional performance. In order to:  Improve driving safety, visual & spatial awareness:  Cervical flx/ext  Cervical side bending  Cervical rotation  Cervical protraction/retraction  Improve lifting mechanics, showering/bathing, dressing, cooking, reaching, pushing & fine motor skills  Shld rolls  Shld depression/elevation  Scapular retraction/protraction/scaption  Posterior shld stretch (cross arm)  Shld ER stretch (chicken wing)  Elbow flx/ext  Forearm supination/pronation  Wrist flx/ext  Open/close hands/fingers  Improve bed mobility & rolling, bending over, cooking & cleaning:  Seated trunk rotations  Seated trunk/thoracic ext/flx  Improve functional gait, squatting, sitting tolerance, functional balance:   Seated marches & " knee to chest  Seated knee flx/ext  Seated hip ER/piriformis stretch  Seated hamstring stretch  Seated toe raise to heel raise   Seated ankle circles each way  Improve overhead reaching/activities, standing tolerance:  Standing lumbar extensions  Standing lateral leaning stretch  Standing quad stretch (UE support for balance)    Diaphragmatic breathing:   Verbal cues for unlabored, long & relaxed breathing w/ increased time for exhalation  Awareness of pulling breath down away from mouth to hips  Cues for for proper abdominal excursion & decreased use of accessory muscles of breathing (hand on stomach & on chest; increased stomach motion, decreased chest motion)  Education on inhalation activating sympathetic nervous system   Education on exhalation activating parasympathetic nervous system  Performed seated with beach ball on lap     Pt Education:  Resisted Exercise Basics  Modified Panchito scale  Weekly topics: motion is lotion, hurt vs harm  Postural awareness during resisted ex and functional mobility  Calming the NS    Mindfulness:   Mindfulness-based activity involving deep breathing, mindful awareness of the body and headspace  Used to activate parasympathetic nervous system to decrease autonomic threat perception and thus reduce central sensitivity to pain  Patient demonstrate safe performance     Functional Activity Endurance   DATE ACTIVITY DURATION DETAILS OTHER/PRE   2/21/25 SciFit 12 min Level 1 3/10   02/26/25 Sci[employer] 19 min Level 1 4/10                          Stephan participated in neuromuscular re-education activities to improve: Balance, Coordination, Kinesthetic, Sense, Proprioception, and Posture for 0 minutes. The following activities were included:    Fitter Board: 20 x2 taps             PRE 4/10   Lat - single HH, uneven taps, cues for decrease lean     Ant/post - B HH, cues for decrease for/back trunk movement, increased discomfort in achilles    Peripheral muscle strengthening which included  "1-2 sets of 10-20 repetitions at a slow, controlled 5-7 second per rep pace focused on strengthening supporting musculature for improved body mechanics & functional mobility.  Patient & therapist focused on proper form & speed during treatment to ensure optimal strengthening of each targeted muscle group & neuromuscular re-education. Patients are instructed to keep sets/reps with proper load to stay at 3-5 out of 10 on the provided modified Panchito exertion scale.    BATCA Machine Exercises Weight (lbs) Sets Repetitions Panchito Exertion Scale Rating   Leg Extension        Hamstring Curls       Chest Press       Pec Fly       Lat Pull Down       Mid Row       Bicep Bar Curls       Standing Tricep Extension       Single Leg Press  R/L       *tightness    Patient Education and Home Exercises     Home Exercises Provided and Patient Education Provided     Education provided:   - FRP Educational Topics: Modified Panchito Exertion Scale, Physical & Psychological Pain Cycles, Z-Lying for Pain Relief/Relaxation, Diaphragmatic Breathing, and Resisted Exercise Basics    Written Home Exercises Provided: Patient instructed to cont prior HEP. Exercises were reviewed and Stephan was able to demonstrate them prior to the end of the session.  Stephan demonstrated good  understanding of the education provided. See EMR under Patient Instructions for information provided during therapy sessions    ASSESSMENT     Stephan participated fairly well PT session. Initially, pt required time and guidance to calm the NS. Discussion and cont practice using the breathe to activate the PNS, to use in situations causing "fight, flight and freeze. Education on other options of mindfulness besides still meditation to calm the NS. Pt transitioned to riding on the NuStep with a slowing down of symptoms with focus on breathe a rhythmic movements. Return to [facility] Is progressing well towards her goals.   Pt prognosis is Good.     Pt will " "continue to benefit from skilled outpatient physical therapy to address the deficits listed in the problem list box on initial evaluation, provide pt/family education and to maximize pt's level of independence in the home and community environment.     Pt's spiritual, cultural and educational needs considered and pt agreeable to plan of care and goals.     Anticipated Barriers for therapy: chronic nature of issues, psychosocial factors, central sensitization    GOALS: Pt is in agreement with the following goals:  Goal Progress Towards Goal   SHORT Term: In 3 weeks, pt will:     Verbalize & demonstrate good understanding of resisted training with 3-1-3 second rep for gwtrztnrqh-rkbqwhlvf-oljsigezf contraction to encourage full muscle recruitment for strength & endurance. Initiated 1/10/2025   Verbalize & demonstrate good understanding of home stretch routine to improve AROM, help decrease pain & improve mobility. Initiated 1/10/2025   Demonstrate proper supine or seated diaphragmatic breathing with decreased chest excursion & emphasis on full abdominal excursion & increased expiration time to promote muscle relaxation & increased parasympathetic activity to improve patient tolerance to activities. Initiated 1/10/2025   Verbalize good understanding of importance of proper posture in resisted exercise, functional activities & ADL's in order to help reduce postural strain, promote proper breathing. Initiated 1/10/2025   Demonstrate good understanding of full body resisted exercises w/ use of pictures &/or verbal cues to promote independence w/ exercise at the end of the program. Initiated 1/10/2025   Verbalize plan for continuing resisted exercises w/ specific location (i.e. commercial gym, home, community fitness center)  to incorporate all major muscle groups to maintain & progress therapeutic functional improvements. Initiated 1/10/2025   Verbalize difference between  "hurt vs harm"  to demonstrate understanding of " fear avoidance in pain cycle.  Initiated 1/10/2025         Midterm: In 8 weeks, pt will:     Verbalize good understanding of activities planning/scheduling (stretching, mindfulness, resisted training, & cardio) prescribed by PT/OT following the end of the program to sustain & progress functional improvements. Initiated 1/10/2025   Perform selected resisted training w/ minimal to no cuing in therapy session to be independent w/ resisted strengthening. Initiated 1/10/2025   Demonstrate improved symptom self-management w/ posture/positioning and mechanical movements and/or implementation of appropriate modalities throughout a typical day.  Initiated 1/10/2025   Demonstrate independence w/ home stretch routine to improve AROM, help decrease pain & improve mobility. Initiated 1/10/2025         Long Term: In 12 weeks, pt will:     Perform selected cardio & resisted training independently to continue safe regular performance of daily exercise. Initiated 1/10/2025   Improve  6 MWT to 395 meters or greater to improve gait speed and mobility. Initiated 1/10/2025   Perform LLE single leg stance 20 seconds or greater bilaterally to improve safety and balance in ADLs. Initiated 1/10/2025   Demonstrate Timed Up & Go (with appropriate assistive device, if necessary) of 8 seconds or less to decrease fall risk and improve functional mobility. Initiated 1/10/2025   Score 70% or more on ANKLE FOTO to indicate significant functional improvements in impaired functions secondary to pain. Initiated 1/10/2025     Initiated 1/10/2025           PLAN     Continue PT per POC     Yelena Forrest, PTA

## 2025-02-27 NOTE — PROGRESS NOTES
I have met with the above student and agree with the assessment and note written. I was present for this encounter. Any changes have been made and authorized by me.  Zeynep Reyes, hospitalsW-BACS 02/27/2025 10:26 AM

## 2025-02-28 ENCOUNTER — CLINICAL SUPPORT (OUTPATIENT)
Dept: REHABILITATION | Facility: OTHER | Age: 35
End: 2025-02-28
Payer: COMMERCIAL

## 2025-02-28 ENCOUNTER — CLINICAL SUPPORT (OUTPATIENT)
Dept: PSYCHIATRY | Facility: OTHER | Age: 35
End: 2025-02-28
Payer: COMMERCIAL

## 2025-02-28 DIAGNOSIS — F41.1 GENERALIZED ANXIETY DISORDER WITH PANIC ATTACKS: Primary | ICD-10-CM

## 2025-02-28 DIAGNOSIS — R68.89 DECREASED FUNCTIONAL ACTIVITY TOLERANCE: Primary | ICD-10-CM

## 2025-02-28 DIAGNOSIS — F41.0 GENERALIZED ANXIETY DISORDER WITH PANIC ATTACKS: Primary | ICD-10-CM

## 2025-02-28 DIAGNOSIS — F33.1 MODERATE EPISODE OF RECURRENT MAJOR DEPRESSIVE DISORDER: ICD-10-CM

## 2025-02-28 DIAGNOSIS — G25.9 FUNCTIONAL MOVEMENT DISORDER: Primary | ICD-10-CM

## 2025-02-28 PROCEDURE — 97530 THERAPEUTIC ACTIVITIES: CPT

## 2025-02-28 PROCEDURE — 97110 THERAPEUTIC EXERCISES: CPT

## 2025-02-28 PROCEDURE — 90853 GROUP PSYCHOTHERAPY: CPT | Mod: ,,, | Performed by: SOCIAL WORKER

## 2025-02-28 PROCEDURE — 97112 NEUROMUSCULAR REEDUCATION: CPT

## 2025-02-28 NOTE — PROGRESS NOTES
Group Psychotherapy    Site: [facility]    Clinical status of patient: Outpatient    2/28/2025    Length of service:[id number]    Referred by: Functional Restoration Program     Number of patients in attendance: 4    Target symptoms: Chronic Pain Management     Patient's response to intervention:  The patient's response to intervention is active listening.    Progress toward goals and other mental status changes:  The patient's progress toward goals is good.    Plan: group psychotherapy    Topics Covered: Pt attended CBT for Chronic Pain as part of their participation in Functional Restoration. Topics discussed today included Personal Values and Self Care and how they relate to chronic pain, depression, anxiety, and other mood disorders.

## 2025-02-28 NOTE — PROGRESS NOTES
"[facility]  Functional Restoration Program  Occupational Therapy Treatment Note  [facility] Day 6    Name: Stephan Wallace  MRN:[id number]  Encounter Date: 2025    Diagnosis:   Encounter Diagnosis   Name Primary?    Functional movement disorder Yes     Referring provider: Aleksandra Rodriguez, NP    Physician Orders: eval and treat; [facility]  Medical Diagnosis:   F44.4 (ICD-10-CM) - Functional neurological symptom disorder with abnormal movement   R53.1,Z74.09,R68.89 (ICD-10-CM) - Decreased strength, endurance, and mobility  R53.83 (ICD-10-CM) - Fatigue, unspecified type  Evaluation Date: 1/10/2025  Insurance Authorization Period Expiration: 2025  Plan of Care Certification Period: 2025  Visit # / Visits authorized: 4/10, plus eval  FOTO completed: 1/3     Precautions:  Standard and Fall    Time In: 14:30  Time Out: 15:38  Total Billable Time: 68 minutes    SUBJECTIVE     She stated: "I don't have time to do mindfulness at work; I work during lunch".    Stephan was compliant with parts of home exercise program given previously.     Response to previous treatment: Ms. Wallace noted: fatigue    Functional change: not yet reported     Pain:       Currently rating pain as 4/10  Location: head/face, mouth, B hand    Description: fatigue, exhaustion    Patient Specific Activities based on Personal goals:  Activity  2025    Performance (P) Satisfaction (S)   Driving 5 2   2.  Engaging in [employer] activities 5 4   3.  Engaging in activities outside of work 5 4   4.  Walking (grocery store, in the park/festival 7 3   5.  Chores 6 2           Total Score  5.6 5    Ratin-10; unable/unsatisfied - Able/Satisfied     Objective     Objective Measures updated at progress report unless specified.    COGNITIVE Exam  Behaviors: pleasant, cooperative. Movements/tics throughout session, in face, UE. Reports difficulties initiation.  Memory: brain fog. Difficulty multitasking. Not tested during eval.  Safety " awareness/insight to disability: impaired judgment and impaired insight; pushes through at work, unable to do this outside work.   Coping skills/emotional control: listening to music. During session anxious, but participating.     Visual/Perceptual:  Tracking: attempted to test; difficult tracking due to movements affecting maintaining eye on target.  Saccades: not tested  Acuity: wears glasses  Convergence: impaired, with decreased ability to accommodate.     Dominant hand: right     Active Range of Motion  Upper Extremity 1/10/2025    RUE LUE   Hands WFL WFL   Wrist WFL WFL   Elbows WFL WFL   Shoulders WFL WFL      Upper Extremity Strength - Manual Muscle Testing  Motion Tested 1/10/2025    Right Left    Shoulder Flexion 4+/5 5/5   Shoulder Extension 5/5 5/5   Shoulder Abduction 5/5 5/5   Shoulder IR 5/5 5/5   Shoulder ER 4/5 4+/5   Elbow Flexion 5/5 5/5   Elbow Extension 5/5 5/5       Strength (in pounds, rung II, average of three trials) -   2/26/2025    Left 40.67 lbs   Right 36.33 lbs   [norms for women aged 35-39: R=74.1 +/-10.8; L=66.3 +/-11.7 (Ed et al, 1985)]     Functional Strength  Pile Testing: Lifting    1/10/2025 (lbs/HR/CHIOMA) 2/26/2025  (lbs/HR/CHIOMA)    Repetitive Floor to Waist NT due to time 8/nt/5   Repetitive Waist to Shoulder  18/101/4   1- Time Maximum   24/97/5   Carry-2 Handed (40ft)   NT   Work demand Level   TBD Light (#20 max, frequent lifting/carrying #10, 2.5 METS    Reason for stop point   Body mechanics, time needed for reps   comment Increased frustration reported after instruction given. Educated in [place]. -    The work demand level listed above is based on the physical performance screening test performed. More comprehensive physical testing integrated with clinical findings would be required to derived an accurate work capacity.      Balance  5 times sit-stand (adults 18-65 y/o) 1/10/2025   >12 sec= fall risk for general elderly  >16 sec= fall risk for Parkinson's  disease  >10 sec= balance/vestibular dysfunction (<61 y/o)  >14.2 sec= balance/vestibular dysfunction (>61 y/o)  >12 sec= fall risk for CVA 13.47 seconds     (without UE used to push up from chair)      Endurance Testing: Modified Ramp Treadmill Test    1/10/2025   Minutes Completed  3 minutes 5 seconds   % Grades  10 %   Speed (mph)  2.1 mph attempted   METS 4    bpm   Panchito Rating Perceived Exertion Scale   5   Reason for stop point Fatigue, Decline in maintaining pace safely , Increased discomfort   Comments Reports achilles pain left         Limitation/Restriction for FOTO NOC Survey     Therapist reviewed FOTO scores for Stephan ESPARZA Encalade on 2/17/2025.  FOTO documents entered into [id number] - see Media section.     Limitation Score: 40%                 Treatment     Stephan received the treatments listed below:     Therapeutic activities and functional lifting activities in order to increase participation in, endurance for, and performance with vocational, selfcare ADLs, and leisure activities in order to improve quality of life, for 49 minutes, including:    Stephan completed functional lifting, floor to waist, 6 reps x 5 lbs with exertion rated Sort of hard (4/10); focused education on sequence of technique.    Stephan completed push and pull with grocery basket in order to reach personal goal of improving grocery shopping ability, educated focused on upright standing posture and activating gluts with functional mobility, leading with hips, decreasing  on handles, using push/pull method with hands for improved control of cart, keeping cart close to center of gravity and weight shifting/stretching as needed. Rates as Hard (5/10) exertion.    Neuromuscular re-education activities to improve Balance, Coordination, Kinesthetic, and Sense for 23 minutes. The following activities were included:    Ball toss for PNS activation; rated as moderate exertion. Open to education on how mindfulness can be active; and  encouraged to find out which activities she participates in, and cause PNS activation outside of work.     Seated on physio ball, with gentle bouncing, for PNS activation.    Use of plant care to shift focus, and for possible PNS activation.    Therapeutic exercises to develop endurance for 12 minutes, including:    Stephan participated in endurance activity using Nustep for 12 minutes, using legs only, starting at level 1 to 1 to improve functional endurance and progress towards increased MET level for improved community mobility and participation, rated as Easy (2/10) exertion, Stephan re-educated on how to add mindfulness component to activity and how to pace appropriately by completed self check ins and grading activity as needed, with goal to reach moderate to sort of hard by end of activity. Completed 966 steps; elevation 905'.    No environmental, cultural, spiritual, developmental or education needs expressed or noted.    Patient Education and Home Exercises   Education provided:   - Progress towards goals   - importance of completion of exercises and activities outside of session to attain goals.   - proper posture and body mechanics.  - anticipated muscular soreness following lift testing and strategies for management including stretching, using ice, scheduled rest.  - Functional pain scale  - CHIOMA rate of perceived exertion scale.   - pain cycle  - pursed lip and diaphragmatic breathing techniques  - details of the Functional Restoration Program  - palming  - options for PNS activation: humming, singing out loud.   - hurt vs harm  - safety in me messaging vs danger in me    Written Home Exercises Provided: Patient instructed to cont prior HEP.  Exercises were reviewed and Stephan was able to demonstrate them prior to the end of the session. Nancyluizaroderick demonstrated good  understanding of the education provided.     See EMR under Patient Instructions for exercises provided during therapy sessions. Patient  education also located in [facility] binder provided on day 1 of the cohort.     Assessment     Ms. Kalinalade with fluctuations of tics and involuntary muscle contractions during session, indicating positive response to engagement in activities that shift focus, but as they are almost constant throughout, difficult to gauge pacing needs, as Stephan has a tendency to ignore and push through.     Stephan is progressing well towards her goals and there are no updates to goals at this time. Stephan's prognosis is good, due to motivation, if able to commit to therapy.    Stephan would continue to benefit from skilled outpatient occupational therapy to address the deficits listed in the problem list on initial evaluation, provide patient education, and to maximize patient's level of independence in the home and community environment.     Anticipated barriers to occupational therapy: level of frustrations, h/o PTSD.    Goals:     SHORT Term goals: In 3 weeks, patient will:  Status of goal:   Implements correct use of breathing techniques during functional lifting tasks, with minimal cues needed. Initiated   Utilizes CHIOMA rate of exertion scale to pace performance with functional lifting tasks, and implements self-care strategies during activity participation to manage pain. Initiated   Verbalize understanding of energy conservation and pacing strategies during daily habits, roles, and routines in order to improve tolerance for work and home demands.  Initiated   Completes 5x sit to stand test in 12 seconds or less to improve ability to participate in community mobility.   Initiated   Demonstrate increased positional tolerance to the level needed for work, home, and community activities (standing in cue at social event, managing supplies for outing, streneous IADL), as evidenced by having a METS level of 5. Initiated   Demonstrate proper body mechanics with functional lifting tasks (floor to waist, waist to shoulder, maximum lift,  and box carry), via teach back method with minimal cues needed. Initiated   Demonstrate proper sequencing  when lifting waist to shoulder for management of (I)ADL, including dressing and grooming, placing items in kitchen cabinets, folding towels, and/or managing suitcase when traveling.   Initiated   Demonstrate proper sequencing when lifting floor to waist to meet demand of work/home routine, including lifting groceries, managing laundry, car parts, and/or managing suitcase when traveling.   Initiated   Tolerate Home Activity Plan (HAP)/ Home Exercise Program (HEP) to promote safety and independence with ADL, with report of completion of at least 2 out of 4 days outside of program participation.  Initiated   Show improved perception of own abilities as evidenced by increase of FOTO scores to established MDC value and perception of performance ratings regarding personal long term goals.  Initiated         Long Term goals: In 9 weeks, patient will: Status of goal:   Report implementation of application of mindfulness, stretching, and other coping strategies for improved participation in daily routine. Initiated   Verbalize functional application of lifting techniques in daily life (golfers lift, floor to waist, waist to shoulder). Initiated   Completes 5x sit to stand test in 9 seconds or less to improve ability to participate in community mobility.  Initiated   Applies functional application of lifting techniques (golfer's lift, floor to waist, waist to shoulder) in activities of daily life, per patient report.  Initiated   Demonstrate physical capacities consistent with a Medium (#50 max, frequent lifting/carrying #25, 3.5 METS)  demand level, as needed to perform activities to be successful in roles of life, regarding Full Time Employment and Household Management. Initiated   Have an improvement of 3 points in 2/5 personal goals in rating of  performance.  Initiated   Show improved perception of own abilities as  evidenced by increase of FOTO scores to established MC II value and perception of performance ratings regarding personal long term goals.  Initiated      Patient Specific Goals; to be met after 2 month of (I) application of tools/techniques learned.  Vocational:  at work   Recreational: engaging in hobby, playing guitar, Holiness activities   Daily Living Activities: driving, chores   Measured by patient's self-reported performance and satisfaction of personal FRP goals, listed above in subjective section.   Total Score = sum of the activity performance scores / number of activities  Minimum detectable change (90%CI) for average score = 2 points  Minimum detectable change (90%CI) for single activity score  = 3 points    Plan     Continue per plan of care.     Updates/Grading for next session: progress as tolerated.    DAYO Sheehan, OTR/L, CEAS-I  2/28/2025

## 2025-02-28 NOTE — PROGRESS NOTES
[facility]    Functional Restoration Program  Physical Therapy Treatment Note  P Day 6    Name: Stephan Wallace  MRN:[id number]      Therapy Diagnosis:   Encounter Diagnosis   Name Primary?    Decreased functional activity tolerance Yes         Physician: Aleksandra Rodriguez NP    Date: 2/28/2025      Time In: 1:30pm  Time Out: 2:30pm  Total Billable Time: 60 mins      SUBJECTIVE     Stephan reports significant fatigue after her work week. Feels like she's constantly fighting with her body and her symptoms, trying to control them, very exhausting.      Current 5/10  Location(s): facial  and UE symptoms, effected speech    OBJECTIVE     Objective Measures updated at progress report unless specified.     Treatment       Stephan received the Individualized Treatments listed below:     Stephan participated in dynamic functional therapeutic activities to improve functional performance for 40 minutes, including:    Full body functional mobility w/ 3-10 sec hold technique &/or 3-5 repetition technique to improve functional performance. In order to:  Improve driving safety, visual & spatial awareness:  Cervical flx/ext  Cervical side bending  Cervical rotation  Cervical protraction/retraction  Improve lifting mechanics, showering/bathing, dressing, cooking, reaching, pushing & fine motor skills  Shld rolls  Shld depression/elevation  Scapular retraction/protraction/scaption  Posterior shld stretch (cross arm)  Shld ER stretch (chicken wing)  Elbow flx/ext  Forearm supination/pronation  Wrist flx/ext  Open/close hands/fingers  Improve bed mobility & rolling, bending over, cooking & cleaning:  Seated trunk rotations  Seated trunk/thoracic ext/flx  Improve functional gait, squatting, sitting tolerance, functional balance:   Seated marches & knee to chest  Seated knee flx/ext  Seated hip ER/piriformis stretch  Seated hamstring stretch  Seated toe raise to heel raise   Seated ankle circles each  way  Improve overhead reaching/activities, standing tolerance:  Standing lumbar extensions  Standing lateral leaning stretch  Standing quad stretch (UE support for balance)     Pt Education:  Resisted Exercise Basics  Modified Panchito scale  Weekly topics: motion is lotion, hurt vs harm  Postural awareness during resisted ex and functional mobility  Calming the NS    Mindfulness:   Mindfulness-based activity involving deep breathing, mindful awareness of the body and headspace  Used to activate parasympathetic nervous system to decrease autonomic threat perception and thus reduce central sensitivity to pain  Patient demonstrate safe performance     Functional Activity Endurance   DATE ACTIVITY DURATION DETAILS OTHER/PRE   2/21/25 SciFit 12 min Level 1 3/10   02/26/25 SciFit 19 min Level 1 4/10   02/28/25 SciFit 10:30 min Level 1  3/10   02/28/25 Hallway ambulation 5 min  4/10            Chazmin participated in neuromuscular re-education activities to improve: Balance, Coordination, Kinesthetic, Sense, Proprioception, and Posture for 20 minutes. The following activities were included:    Somatic Tracking:  Mindfulness-based, guided imagery to reduce fear-avoidance and catastrophizing around physical symptoms in body  Guided patient through attending to negative (painful), neutral, and positive sensations in the body  Used imagery and metaphors to reduce fear and hyperfixation on symptoms  Offered patient permission not to control symptoms, allow them to happen for short period  Cued patient for feeling where frustration showed up somatically in her body    NP:  Fitter Board: 20 x2 taps             PRE 4/10   Lat - single HH, uneven taps, cues for decrease lean     Ant/post - B HH, cues for decrease for/back trunk movement, increased discomfort in achilles    Peripheral muscle strengthening which included 1-2 sets of 10-20 repetitions at a slow, controlled 5-7 second per rep pace focused on strengthening supporting  musculature for improved body mechanics & functional mobility.  Patient & therapist focused on proper form & speed during treatment to ensure optimal strengthening of each targeted muscle group & neuromuscular re-education. Patients are instructed to keep sets/reps with proper load to stay at 3-5 out of 10 on the provided modified Panchito exertion scale.    BATCA Machine Exercises Weight (lbs) Sets Repetitions Panchito Exertion Scale Rating   Leg Extension        Hamstring Curls       Chest Press       Pec Fly       Lat Pull Down       Mid Row       Bicep Bar Curls       Standing Tricep Extension       Single Leg Press  R/L       *tightness    Patient Education and Home Exercises     Home Exercises Provided and Patient Education Provided     Education provided:   - FRP Educational Topics: Modified Panchito Exertion Scale, Physical & Psychological Pain Cycles, Z-Lying for Pain Relief/Relaxation, Diaphragmatic Breathing, and Resisted Exercise Basics    Written Home Exercises Provided: Patient instructed to cont prior HEP. Exercises were reviewed and Stephan was able to demonstrate them prior to the end of the session.  Stephan demonstrated good  understanding of the education provided. See EMR under Patient Instructions for information provided during therapy sessions    ASSESSMENT     Stephan tolerated treatment moderately well. Had multiple episodes of nervous system tics and momentary paralysis so cued patient to reduce internal pressure and expectations on herself in current session and in program more generally. Pt voiced fighting frequently with herself to control symptoms so used somatic tracking with her in effort to allow symptoms and notice how her body responded. She voiced ongoing frustration but reduced stress during exercise indicating benefit from ongoing practice with somatic tracking. Continues to benefit from NuStep exercise and distraction from symptoms with movement.    Stephan Is progressing well towards her  "goals.   Pt prognosis is Good.     Pt will continue to benefit from skilled outpatient physical therapy to address the deficits listed in the problem list box on initial evaluation, provide pt/family education and to maximize pt's level of independence in the home and community environment.     Pt's spiritual, cultural and educational needs considered and pt agreeable to plan of care and goals.     Anticipated Barriers for therapy: chronic nature of issues, psychosocial factors, central sensitization    GOALS: Pt is in agreement with the following goals:  Goal Progress Towards Goal   SHORT Term: In 3 weeks, pt will:     Verbalize & demonstrate good understanding of resisted training with 3-1-3 second rep for [id number] contraction to encourage full muscle recruitment for strength & endurance. Initiated 1/10/2025   Verbalize & demonstrate good understanding of home stretch routine to improve AROM, help decrease pain & improve mobility. Initiated 1/10/2025   Demonstrate proper supine or seated diaphragmatic breathing with decreased chest excursion & emphasis on full abdominal excursion & increased expiration time to promote muscle relaxation & increased parasympathetic activity to improve patient tolerance to activities. Initiated 1/10/2025   Verbalize good understanding of importance of proper posture in resisted exercise, functional activities & ADL's in order to help reduce postural strain, promote proper breathing. Initiated 1/10/2025   Demonstrate good understanding of full body resisted exercises w/ use of pictures &/or verbal cues to promote independence w/ exercise at the end of the program. Initiated 1/10/2025   Verbalize plan for continuing resisted exercises w/ specific location (i.e. commercial gym, home, community fitness center)  to incorporate all major muscle groups to maintain & progress therapeutic functional improvements. Initiated 1/10/2025   Verbalize difference between  "hurt " "vs harm"  to demonstrate understanding of fear avoidance in pain cycle.  Initiated 1/10/2025         Midterm: In 8 weeks, pt will:     Verbalize good understanding of activities planning/scheduling (stretching, mindfulness, resisted training, & cardio) prescribed by PT/OT following the end of the program to sustain & progress functional improvements. Initiated 1/10/2025   Perform selected resisted training w/ minimal to no cuing in therapy session to be independent w/ resisted strengthening. Initiated 1/10/2025   Demonstrate improved symptom self-management w/ posture/positioning and mechanical movements and/or implementation of appropriate modalities throughout a typical day.  Initiated 1/10/2025   Demonstrate independence w/ home stretch routine to improve AROM, help decrease pain & improve mobility. Initiated 1/10/2025         Long Term: In 12 weeks, pt will:     Perform selected cardio & resisted training independently to continue safe regular performance of daily exercise. Initiated 1/10/2025   Improve  6 MWT to 395 meters or greater to improve gait speed and mobility. Initiated 1/10/2025   Perform LLE single leg stance 20 seconds or greater bilaterally to improve safety and balance in ADLs. Initiated 1/10/2025   Demonstrate Timed Up & Go (with appropriate assistive device, if necessary) of 8 seconds or less to decrease fall risk and improve functional mobility. Initiated 1/10/2025   Score 70% or more on ANKLE FOTO to indicate significant functional improvements in impaired functions secondary to pain. Initiated 1/10/2025     Initiated 1/10/2025           PLAN     Continue PT per POC     Miguel Santoyo, PT    "

## 2025-03-02 ENCOUNTER — PATIENT MESSAGE (OUTPATIENT)
Facility: CLINIC | Age: 35
End: 2025-03-02
Payer: COMMERCIAL

## 2025-03-05 ENCOUNTER — CLINICAL SUPPORT (OUTPATIENT)
Dept: REHABILITATION | Facility: OTHER | Age: 35
End: 2025-03-05
Payer: COMMERCIAL

## 2025-03-05 ENCOUNTER — CLINICAL SUPPORT (OUTPATIENT)
Dept: PSYCHIATRY | Facility: OTHER | Age: 35
End: 2025-03-05
Payer: COMMERCIAL

## 2025-03-05 DIAGNOSIS — F41.0 GENERALIZED ANXIETY DISORDER WITH PANIC ATTACKS: Primary | ICD-10-CM

## 2025-03-05 DIAGNOSIS — G25.9 FUNCTIONAL MOVEMENT DISORDER: Primary | ICD-10-CM

## 2025-03-05 DIAGNOSIS — R68.89 DECREASED FUNCTIONAL ACTIVITY TOLERANCE: Primary | ICD-10-CM

## 2025-03-05 DIAGNOSIS — F41.1 GENERALIZED ANXIETY DISORDER WITH PANIC ATTACKS: Primary | ICD-10-CM

## 2025-03-05 DIAGNOSIS — R68.89 DECREASED FUNCTIONAL ACTIVITY TOLERANCE: ICD-10-CM

## 2025-03-05 PROCEDURE — 97530 THERAPEUTIC ACTIVITIES: CPT

## 2025-03-05 PROCEDURE — 97530 THERAPEUTIC ACTIVITIES: CPT | Mod: CQ

## 2025-03-05 PROCEDURE — 90853 GROUP PSYCHOTHERAPY: CPT | Mod: ,,, | Performed by: SOCIAL WORKER

## 2025-03-05 PROCEDURE — 97112 NEUROMUSCULAR REEDUCATION: CPT | Mod: CQ

## 2025-03-05 NOTE — PROGRESS NOTES
[facility]    Functional Restoration Program  Physical Therapy Treatment Note  FRP Day 7    Name: Stephan Wallace  MRN:[id number]      Therapy Diagnosis:   Encounter Diagnosis   Name Primary?    Decreased functional activity tolerance Yes       Physician: Aleksandra Rodriguez NP    Date: 3/5/2025      Time In: 2:30pm  Time Out: 3:30pm  Total Billable Time: 60 mins      SUBJECTIVE     Stephan reports her condition makes it longer/harder to complete tasks at work and home. She works 40 hrs/wk but with FRP she is having to work longer days to make up the hrs. 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 Pt states she is busy at work and then on long drive home, depression sets in.    FRP seems to bring on more frustration.   Work is very stressful.  Self motivation is difficult      Current 5/10  Location(s): facial  and UE symptoms, effected speech    OBJECTIVE     Objective Measures updated at progress report unless specified.     Treatment       Stephan received the Individualized Treatments listed below:     Stephan participated in dynamic functional therapeutic activities to improve functional performance for 40 minutes, including:    Full body functional mobility w/ 3-10 sec hold technique &/or 3-5 repetition technique to improve functional performance. In order to: NP  Improve driving safety, visual & spatial awareness:  Cervical flx/ext  Cervical side bending  Cervical rotation  Cervical protraction/retraction  Improve lifting mechanics, showering/bathing, dressing, cooking, reaching, pushing & fine motor skills  Shld rolls  Shld depression/elevation  Scapular retraction/protraction/scaption  Posterior shld stretch (cross arm)  Shld ER stretch (chicken wing)  Elbow flx/ext  Forearm supination/pronation  Wrist flx/ext  Open/close hands/fingers  Improve bed mobility & rolling, bending over, cooking & cleaning:  Seated trunk rotations  Seated trunk/thoracic  ext/flx  Improve functional gait, squatting, sitting tolerance, functional balance:   Seated marches & knee to chest  Seated knee flx/ext  Seated hip ER/piriformis stretch  Seated hamstring stretch  Seated toe raise to heel raise   Seated ankle circles each way  Improve overhead reaching/activities, standing tolerance:  Standing lumbar extensions  Standing lateral leaning stretch  Standing quad stretch (UE support for balance)     Pt Education:  Resisted Exercise Basics  Modified Panchito scale  Weekly topics: motion is lotion, hurt vs harm, forming new habits  Postural awareness during resisted ex and functional mobility  Calming the NS    Mindfulness:   Mindfulness-based activity involving deep breathing, mindful awareness of the body and headspace  Used to activate parasympathetic nervous system to decrease autonomic threat perception and thus reduce central sensitivity to pain  Patient demonstrate safe performance     Functional Activity Endurance   DATE ACTIVITY DURATION DETAILS OTHER/PRE   2/21/25 SciFit 12 min Level 1 3/10   02/26/25 SciFit 19 min Level 1 4/10   02/28/25 SciFit 10:30 min Level 1  3/10   02/28/25 Hallway ambulation 5 min  4/10   3/5/25 Sci Fit 20 min Level 1, no UE's 4/10     Chazmin participated in neuromuscular re-education activities to improve: Balance, Coordination, Kinesthetic, Sense, Proprioception, and Posture for 20 minutes. The following activities were included:    Somatic Tracking:  Mindfulness-based, guided imagery to reduce fear-avoidance and catastrophizing around physical symptoms in body  Guided patient through attending to negative (painful), neutral, and positive sensations in the body  Used imagery and metaphors to reduce fear and hyperfixation on symptoms  Offered patient permission not to control symptoms, allow them to happen for short period  Cued patient for feeling where frustration showed up somatically in her body    NP:  Fitter Board: 20 x2 taps             PRE  4/10   Lat - single HH, uneven taps, cues for decrease lean     Ant/post - B HH, cues for decrease for/back trunk movement, increased discomfort in achilles    Peripheral muscle strengthening which included 1-2 sets of 10-20 repetitions at a slow, controlled 5-7 second per rep pace focused on strengthening supporting musculature for improved body mechanics & functional mobility.  Patient & therapist focused on proper form & speed during treatment to ensure optimal strengthening of each targeted muscle group & neuromuscular re-education. Patients are instructed to keep sets/reps with proper load to stay at 3-5 out of 10 on the provided modified Panchito exertion scale.    VCharge Machine Exercises Weight (lbs) Sets Repetitions Panchito Exertion Scale Rating   Leg Extension        Hamstring Curls       Chest Press 17.5  20 3.5   Pec Fly 17.5  20 4   Lat Pull Down       Mid Row       Bicep Bar Curls 5  15 4   Standing Tricep Extension 7.5  15 3   Single Leg Press  R/L       *tightness    Patient Education and Home Exercises     Home Exercises Provided and Patient Education Provided     Education provided:   - FRP Educational Topics: Modified Panchito Exertion Scale, Physical & Psychological Pain Cycles, Z-Lying for Pain Relief/Relaxation, Diaphragmatic Breathing, and Resisted Exercise Basics    Written Home Exercises Provided: Patient instructed to cont prior HEP. Exercises were reviewed and Stephan was able to demonstrate them prior to the end of the session.  Stephan demonstrated good  understanding of the education provided. See EMR under Patient Instructions for information provided during therapy sessions    ASSESSMENT     Stephan participated in PT session well even though initially having episodes with increased involuntary facial movements and difficulty speaking. She voiced ongoing frustration but reduced stress during exercise indicating benefit from ongoing practice with somatic tracking. Continues to benefit from NuStep  exercise and distraction from symptoms with movement. After 20 min on NuStep and education on calming hte NS without frustration and self judgment, pt's pain rating went form 6/10 to 4/10 and was able to cont calming the NS with resisted ex on the BATCA. Progress as tolerates.    Stephan Is progressing well towards her goals.   Pt prognosis is Good.     Pt will continue to benefit from skilled outpatient physical therapy to address the deficits listed in the problem list box on initial evaluation, provide pt/family education and to maximize pt's level of independence in the home and community environment.     Pt's spiritual, cultural and educational needs considered and pt agreeable to plan of care and goals.     Anticipated Barriers for therapy: chronic nature of issues, psychosocial factors, central sensitization    GOALS: Pt is in agreement with the following goals:  Goal Progress Towards Goal   SHORT Term: In 3 weeks, pt will:     Verbalize & demonstrate good understanding of resisted training with 3-1-3 second rep for [id number] contraction to encourage full muscle recruitment for strength & endurance. Initiated 1/10/2025   Verbalize & demonstrate good understanding of home stretch routine to improve AROM, help decrease pain & improve mobility. Initiated 1/10/2025   Demonstrate proper supine or seated diaphragmatic breathing with decreased chest excursion & emphasis on full abdominal excursion & increased expiration time to promote muscle relaxation & increased parasympathetic activity to improve patient tolerance to activities. Initiated 1/10/2025   Verbalize good understanding of importance of proper posture in resisted exercise, functional activities & ADL's in order to help reduce postural strain, promote proper breathing. Initiated 1/10/2025   Demonstrate good understanding of full body resisted exercises w/ use of pictures &/or verbal cues to promote independence w/ exercise at the end  "of the program. Initiated 1/10/2025   Verbalize plan for continuing resisted exercises w/ specific location (i.e. commercial gym, home, community fitness center)  to incorporate all major muscle groups to maintain & progress therapeutic functional improvements. Initiated 1/10/2025   Verbalize difference between  "hurt vs harm"  to demonstrate understanding of fear avoidance in pain cycle.  Initiated 1/10/2025         Midterm: In 8 weeks, pt will:     Verbalize good understanding of activities planning/scheduling (stretching, mindfulness, resisted training, & cardio) prescribed by PT/OT following the end of the program to sustain & progress functional improvements. Initiated 1/10/2025   Perform selected resisted training w/ minimal to no cuing in therapy session to be independent w/ resisted strengthening. Initiated 1/10/2025   Demonstrate improved symptom self-management w/ posture/positioning and mechanical movements and/or implementation of appropriate modalities throughout a typical day.  Initiated 1/10/2025   Demonstrate independence w/ home stretch routine to improve AROM, help decrease pain & improve mobility. Initiated 1/10/2025         Long Term: In 12 weeks, pt will:     Perform selected cardio & resisted training independently to continue safe regular performance of daily exercise. Initiated 1/10/2025   Improve  6 MWT to 395 meters or greater to improve gait speed and mobility. Initiated 1/10/2025   Perform LLE single leg stance 20 seconds or greater bilaterally to improve safety and balance in ADLs. Initiated 1/10/2025   Demonstrate Timed Up & Go (with appropriate assistive device, if necessary) of 8 seconds or less to decrease fall risk and improve functional mobility. Initiated 1/10/2025   Score 70% or more on ANKLE FOTO to indicate significant functional improvements in impaired functions secondary to pain. Initiated 1/10/2025     Initiated 1/10/2025           PLAN     Continue PT per [facility] " Riddhi Forrest, PTA

## 2025-03-05 NOTE — PROGRESS NOTES
"[facility]  Functional Restoration Program  Occupational Therapy Treatment Note  [facility] Day 7    Name: Stephan Wallace  MRN:[id number]  Encounter Date: 3/5/2025    Diagnosis:   Encounter Diagnoses   Name Primary?    Functional movement disorder Yes    Decreased functional activity tolerance        Referring provider: Aleksandra Rodriguez NP    Physician Orders: eval and treat; [facility]  Medical Diagnosis:   F44.4 (ICD-10-CM) - Functional neurological symptom disorder with abnormal movement   R53.1,Z74.09,R68.89 (ICD-10-CM) - Decreased strength, endurance, and mobility  R53.83 (ICD-10-CM) - Fatigue, unspecified type  Evaluation Date: 1/10/2025  Insurance Authorization Period Expiration: 2025  Plan of Care Certification Period: 2025  Visit # / Visits authorized: 6/10, plus eval  FOTO completed: 1/3      Precautions:  Standard and Fall    Time In: 1345  Time Out: 1430  Total Billable Time: 45 minutes    SUBJECTIVE     She stated: "I'm trying, but it feels like I'm fighting my body. If I let my body do what it wants, I wouldn't be able to do what I want it to do. If I let it do what it wants to do, it would be really intense" "do you think this is connected to my migraines too?" Re: education on MILADIS Rothman was compliant with parts of home exercise program given previously.     Response to previous treatment: Ms. Wallace noted: fatigue     Functional change: increased awareness, working on breathing and safety in me messaging.     Pain:       Currently rating pain as 7-3 /10  Location: head/face, mouth, B hand    Description: fatigue, exhaustion    Patient Specific Activities based on Personal goals:  Activity  2025      Performance (P) Satisfaction (S)       Driving 5 2     2.  Engaging in Oriental orthodox activities 5 4     3.  Engaging in activities outside of work 5 4     4.  Walking (grocery store, in the park/festival 7 3     5.  Chores 6 2               Total Score  5.6 5      Ratin-10; " unable/unsatisfied - Able/Satisfied     Objective     Objective Measures updated at progress report unless specified.    COGNITIVE Exam  Behaviors: pleasant, cooperative. Movements/tics throughout session, in face, UE. Reports difficulties initiation.  Memory: brain fog. Difficulty multitasking. Not tested during eval.  Safety awareness/insight to disability: impaired judgment and impaired insight; pushes through at work, unable to do this outside work.   Coping skills/emotional control: listening to music. During session anxious, but participating.     Visual/Perceptual:  Tracking: attempted to test; difficult tracking due to movements affecting maintaining eye on target.  Saccades: not tested  Acuity: wears glasses  Convergence: impaired, with decreased ability to accommodate.     Dominant hand: right     Active Range of Motion  Upper Extremity 1/10/2025    RUE LUE   Hands WFL WFL   Wrist WFL WFL   Elbows WFL WFL   Shoulders WFL WFL      Upper Extremity Strength - Manual Muscle Testing  Motion Tested 1/10/2025    Right Left    Shoulder Flexion 4+/5 5/5   Shoulder Extension 5/5 5/5   Shoulder Abduction 5/5 5/5   Shoulder IR 5/5 5/5   Shoulder ER 4/5 4+/5   Elbow Flexion 5/5 5/5   Elbow Extension 5/5 5/5       Strength (in pounds, rung II, average of three trials) -   2/26/2025 3/6/2025     Left 40.67 lbs     Right 36.33 lbs     [norms for women aged 35-39: R=74.1 +/-10.8; L=66.3 +/-11.7 (Ed et al, 1985)]     Functional Strength  Pile Testing: Lifting    1/10/2025 (lbs/HR/CHIOMA) 2/26/2025  (lbs/HR/CHIOMA) 3/6/2025     Repetitive Floor to Waist NT due to time 8/nt/5     Repetitive Waist to Shoulder  18/101/4     1- Time Maximum   24/97/5     Carry-2 Handed (40ft)   NT     Work demand Level   TBD Light (#20 max, frequent lifting/carrying #10, 2.5 METS      Reason for stop point   Body mechanics, time needed for reps     comment Increased frustration reported after instruction given. Educated in [facility]. -    "  The work demand level listed above is based on the physical performance screening test performed. More comprehensive physical testing integrated with clinical findings would be required to derived an accurate work capacity.      Balance  5 times sit-stand (adults 18-65 y/o) 1/10/2025 3/6/2025    >12 sec= fall risk for general elderly  >16 sec= fall risk for Parkinson's disease  >10 sec= balance/vestibular dysfunction (<59 y/o)  >14.2 sec= balance/vestibular dysfunction (>59 y/o)  >12 sec= fall risk for CVA 13.47 seconds     (without UE used to push up from chair)             Endurance Testing: Modified Ramp Treadmill Test    1/10/2025 3/6/2025    Minutes Completed  3 minutes 5 seconds     % Grades  10 %     Speed (mph)  2.1 mph attempted     METS 4      bpm     Panchito Rating Perceived Exertion Scale   5     Reason for stop point Fatigue, Decline in maintaining pace safely , Increased discomfort     Comments Reports achilles pain left           Limitation/Restriction for FOTO NOC Survey     Therapist reviewed FOTO scores for Stephan ESPARZA [place] on 2/17/2025.   FOTO documents entered into EPIC - see Media section.     Limitation Score: 40%                 Treatment     Stephan received the treatments listed below:     Therapeutic activities and functional lifting activities in order to increase participation in, endurance for, and performance with vocational, selfcare ADLs, and leisure activities in order to improve quality of life, for 45 minutes, including:    Arrived tardy 2* taking stairs and then eating lunch before initiating session.     Iniitated stretch routine, marked onset of movement episode limiting participation. External cues from therapist to pause from stretch routine and initiate walking in moser with thearpist as company.    In moser, walking laps x2 then changing direction, cues for breath and activation of PNS: 5 sensory scan, counting exhale, "safety in me" messaging vs danger in me.  Rated " "movement and control on scale of 1-10, initially at 7-8/10, as progressed with movement and breath and PNS activation, rated as 3/10 and then 2/10.   Extended conversation and education on impact of ignoring body's expression of stressors vs engaging and calming nervous system.   Rated activity as sort of hard 4/10, but "helpful"        No environmental, cultural, spiritual, developmental or education needs expressed or noted.    Patient Education and Home Exercises   Education provided:   - Progress towards goals   - importance of completion of exercises and activities outside of session to attain goals.   - proper posture and body mechanics.  - anticipated muscular soreness following lift testing and strategies for management including stretching, using ice, scheduled rest.  - Functional pain scale  - CHIOMA rate of perceived exertion scale.   - pain cycle  - pursed lip and diaphragmatic breathing techniques  - details of the Functional Restoration Program  - palming  - options for PNS activation: humming, singing out loud.   - hurt vs harm  - safety in me messaging vs danger in me    Written Home Exercises Provided: Patient instructed to cont prior HEP.  Exercises were reviewed and Stephan was able to demonstrate them prior to the end of the session. Stephan demonstrated good  understanding of the education provided.     See EMR under Patient Instructions for exercises provided during therapy sessions. Patient education also located in FRP binder provided on day 1 of the cohort.     Assessment     Ms. Wallace with limited tolerance for stretch routine this date, noted to arrive to session in elevated state, verbalizing difficulty settling movement episodes, improved with assistance from therapist. Noted to benefit from physical boundaries (stepping away, moving, changing direction) in tandem with education and conversation. Positive response to engagement in activities that shift focus, but as they are almost " constant throughout, difficult to gauge pacing needs but improved with rating scale of movement from 1 (meaning grounded) to 10 (uncontrolled movement). Stephan endorsed a tendency to ignore and push through. Overall good session, but will benefit from continued education and assistance in proper pacing.     Stephan is progressing well towards her goals and there are no updates to goals at this time. Stephan's prognosis is good, due to motivation, if able to commit to therapy.    Stephan would continue to benefit from skilled outpatient occupational therapy to address the deficits listed in the problem list on initial evaluation, provide patient education, and to maximize patient's level of independence in the home and community environment.     Anticipated barriers to occupational therapy: level of frustrations, h/o PTSD.    Goals:     SHORT Term goals: In 3 weeks, patient will:  Status of goal, reviewed 3/6/2025:   Implements correct use of breathing techniques during functional lifting tasks, with minimal cues needed. Progressing   Utilizes CHIOMA rate of exertion scale to pace performance with functional lifting tasks, and implements self-care strategies during activity participation to manage pain. Progressing   Verbalize understanding of energy conservation and pacing strategies during daily habits, roles, and routines in order to improve tolerance for work and home demands.  Progressing   Completes 5x sit to stand test in 12 seconds or less to improve ability to participate in community mobility.   Progressing   Demonstrate increased positional tolerance to the level needed for work, home, and community activities (standing in cue at social event, managing supplies for outing, streneous IADL), as evidenced by having a METS level of 5. Progressing   Demonstrate proper body mechanics with functional lifting tasks (floor to waist, waist to shoulder, maximum lift, and box carry), via teach back method with minimal  cues needed. Progressing   Demonstrate proper sequencing  when lifting waist to shoulder for management of (I)ADL, including dressing and grooming, placing items in kitchen cabinets, folding towels, and/or managing suitcase when traveling.   Progressing   Demonstrate proper sequencing when lifting floor to waist to meet demand of work/home routine, including lifting groceries, managing laundry, car parts, and/or managing suitcase when traveling.   Progressing   Tolerate Home Activity Plan (HAP)/ Home Exercise Program (HEP) to promote safety and independence with ADL, with report of completion of at least 2 out of 4 days outside of program participation.  Progressing   Show improved perception of own abilities as evidenced by increase of FOTO scores to established MDC value and perception of performance ratings regarding personal long term goals.  Progressing         Long Term goals: In 9 weeks, patient will: Status of goal, reviewed 3/6/2025:   Report implementation of application of mindfulness, stretching, and other coping strategies for improved participation in daily routine. Progressing   Verbalize functional application of lifting techniques in daily life (golfers lift, floor to waist, waist to shoulder). Progressing   Completes 5x sit to stand test in 9 seconds or less to improve ability to participate in community mobility.  Progressing   Applies functional application of lifting techniques (golfer's lift, floor to waist, waist to shoulder) in activities of daily life, per patient report.  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 Progressing   Have an improvement of 3 points in 2/5 personal goals in rating of  performance.  Progressing   Show improved perception of own abilities as evidenced  by increase of FOTO scores to established MC II value and perception of performance ratings regarding personal long term goals.  Progressing      Patient Specific Goals; to be met after 2 month of (I) application of tools/techniques learned.  Vocational:  at work   Recreational: engaging in hobby, playing guitar, [employer] activities   Daily Living Activities: driving, chores   Measured by patient's self-reported performance and satisfaction of personal FRP goals, listed above in subjective section.   Total Score = sum of the activity performance scores / number of activities  Minimum detectable change (90%CI) for average score = 2 points  Minimum detectable change (90%CI) for single activity score  = 3 points    Plan     Continue per plan of care.     Updates/Grading for next session: progress as tolerated. REASSESSMENT    MAYRA Mccain   3/6/2025

## 2025-03-06 ENCOUNTER — CLINICAL SUPPORT (OUTPATIENT)
Dept: PSYCHIATRY | Facility: OTHER | Age: 35
End: 2025-03-06
Payer: COMMERCIAL

## 2025-03-06 ENCOUNTER — CLINICAL SUPPORT (OUTPATIENT)
Dept: REHABILITATION | Facility: OTHER | Age: 35
End: 2025-03-06
Payer: COMMERCIAL

## 2025-03-06 DIAGNOSIS — F41.1 GENERALIZED ANXIETY DISORDER WITH PANIC ATTACKS: Primary | ICD-10-CM

## 2025-03-06 DIAGNOSIS — F41.0 GENERALIZED ANXIETY DISORDER WITH PANIC ATTACKS: Primary | ICD-10-CM

## 2025-03-06 DIAGNOSIS — R68.89 DECREASED FUNCTIONAL ACTIVITY TOLERANCE: Primary | ICD-10-CM

## 2025-03-06 DIAGNOSIS — G25.9 FUNCTIONAL MOVEMENT DISORDER: Primary | ICD-10-CM

## 2025-03-06 PROCEDURE — 97530 THERAPEUTIC ACTIVITIES: CPT | Mod: CQ

## 2025-03-06 PROCEDURE — 97112 NEUROMUSCULAR REEDUCATION: CPT | Mod: CQ

## 2025-03-06 PROCEDURE — 90853 GROUP PSYCHOTHERAPY: CPT | Mod: ,,, | Performed by: SOCIAL WORKER

## 2025-03-06 PROCEDURE — 97530 THERAPEUTIC ACTIVITIES: CPT

## 2025-03-06 NOTE — PROGRESS NOTES
[facility]    Functional Restoration Program  Physical Therapy Treatment Note  [facility] Day 7    Name: Stephan Wallace  MRN:[id number]      Therapy Diagnosis:   Encounter Diagnosis   Name Primary?    Decreased functional activity tolerance Yes       Physician: Aleksandra Rodriguez NP    Date: 3/6/2025      Time In: 2:30pm  Time Out: 3:30pm  Total Billable Time: 60 mins      SUBJECTIVE     Stephan reports her condition makes it longer/harder to complete tasks at work and home. She works 40 hrs/wk but with FRP she is having to work longer days to make up the hrs. 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 Pt states she is busy at work and then on long drive home, depression sets in.    FRP seems to bring on more frustration.   Work is very stressful.  Self motivation is difficult      Current 5/10  Location(s): facial  and UE symptoms, effected speech    OBJECTIVE     Objective Measures updated at progress report unless specified.     Treatment       Stephan received the Individualized Treatments listed below:     Stephan participated in dynamic functional therapeutic activities to improve functional performance for 40 minutes, including:    Full body functional mobility w/ 3-10 sec hold technique &/or 3-5 repetition technique to improve functional performance. In order to: NP  Improve driving safety, visual & spatial awareness:  Cervical flx/ext  Cervical side bending  Cervical rotation  Cervical protraction/retraction  Improve lifting mechanics, showering/bathing, dressing, cooking, reaching, pushing & fine motor skills  Shld rolls  Shld depression/elevation  Scapular retraction/protraction/scaption  Posterior shld stretch (cross arm)  Shld ER stretch (chicken wing)  Elbow flx/ext  Forearm supination/pronation  Wrist flx/ext  Open/close hands/fingers  Improve bed mobility & rolling, bending over, cooking & cleaning:  Seated trunk rotations  Seated trunk/thoracic  ext/flx  Improve functional gait, squatting, sitting tolerance, functional balance:   Seated marches & knee to chest  Seated knee flx/ext  Seated hip ER/piriformis stretch  Seated hamstring stretch  Seated toe raise to heel raise   Seated ankle circles each way  Improve overhead reaching/activities, standing tolerance:  Standing lumbar extensions  Standing lateral leaning stretch  Standing quad stretch (UE support for balance)     Pt Education:  Resisted Exercise Basics  Modified Panchito scale  Weekly topics: motion is lotion, hurt vs harm, forming new habits  Postural awareness during resisted ex and functional mobility  Calming the NS    Mindfulness:   Mindfulness-based activity involving deep breathing, mindful awareness of the body and headspace  Used to activate parasympathetic nervous system to decrease autonomic threat perception and thus reduce central sensitivity to pain  Patient demonstrate safe performance     Functional Activity Endurance   DATE ACTIVITY DURATION DETAILS OTHER/PRE   2/21/25 SciFit 12 min Level 1 3/10   02/26/25 SciFit 19 min Level 1 4/10   02/28/25 SciFit 10:30 min Level 1  3/10   02/28/25 Hallway ambulation 5 min  4/10   3/5/25 Sci Fit 20 min Level 1, no UE's 4/10            Chazmin participated in neuromuscular re-education activities to improve: Balance, Coordination, Kinesthetic, Sense, Proprioception, and Posture for 20 minutes. The following activities were included:    Somatic Tracking:  Mindfulness-based, guided imagery to reduce fear-avoidance and catastrophizing around physical symptoms in body  Guided patient through attending to negative (painful), neutral, and positive sensations in the body  Used imagery and metaphors to reduce fear and hyperfixation on symptoms  Offered patient permission not to control symptoms, allow them to happen for short period  Cued patient for feeling where frustration showed up somatically in her body      Fitter Board: 20 x2 taps             PRE  4/10   Lat - single HH, uneven taps, cues for decrease lean     Ant/post - B HH, cues for decrease for/back trunk movement, increased discomfort in achilles    Peripheral muscle strengthening which included 1-2 sets of 10-20 repetitions at a slow, controlled 5-7 second per rep pace focused on strengthening supporting musculature for improved body mechanics & functional mobility.  Patient & therapist focused on proper form & speed during treatment to ensure optimal strengthening of each targeted muscle group & neuromuscular re-education. Patients are instructed to keep sets/reps with proper load to stay at 3-5 out of 10 on the provided modified Panchito exertion scale.    [employer] Machine Exercises Weight (lbs) Sets Repetitions Panchito Exertion Scale Rating   Leg Extension  7.5  15 7   Hamstring Curls 15  15 4.5   Chest Press       Pec Fly       Lat Pull Down       Mid Row 20  20 3   Bicep Bar Curls       Standing Tricep Extension       Single Leg Press  R/L 17.5  15 4   *tightness    Patient Education and Home Exercises     Home Exercises Provided and Patient Education Provided     Education provided:   - [facility] Educational Topics: Modified Panchito Exertion Scale, Physical & Psychological Pain Cycles, Z-Lying for Pain Relief/Relaxation, Diaphragmatic Breathing, and Resisted Exercise Basics    Written Home Exercises Provided: Patient instructed to cont prior HEP. Exercises were reviewed and Stephan was able to demonstrate them prior to the end of the session.  Stephan demonstrated good  understanding of the education provided. See EMR under Patient Instructions for information provided during therapy sessions    ASSESSMENT     Stephan participated fairly well in PT session due to experiencing episodes with increased involuntary facial movements and difficulty speaking. Pt walked up to [facility] gym during CBT to sit away from the class and retreive her putty. This shows improved self efficacy. She voiced ongoing frustration but reduced  "stress during exercise indicating benefit from ongoing resistance training. After 20 min on NuStep and education on calming hte NS without frustration and self judgment, pt's pain rating went form 6/10 to 4/10 and was able to cont calming the NS with resisted ex on the BATCA. Education and conversation on impact of psychosocial stressors on pain experience. Pt open to feedback endorsed difficulty with awareness of impact without cues.  Review pain and psychological cycles again. Discussed scheduling and sleeping concerns. Suggested "no change makes no change". Strong suggestions for "homework" this weekend.    Stephan Is progressing well towards her goals.   Pt prognosis is Good.     Pt will continue to benefit from skilled outpatient physical therapy to address the deficits listed in the problem list box on initial evaluation, provide pt/family education and to maximize pt's level of independence in the home and community environment.     Pt's spiritual, cultural and educational needs considered and pt agreeable to plan of care and goals.     Anticipated Barriers for therapy: chronic nature of issues, psychosocial factors, central sensitization    GOALS: Pt is in agreement with the following goals:  Goal Progress Towards Goal   SHORT Term: In 3 weeks, pt will:     Verbalize & demonstrate good understanding of resisted training with 3-1-3 second rep for gnttetnlli-pairmsilt-tcaokohtw contraction to encourage full muscle recruitment for strength & endurance. Initiated 1/10/2025   Verbalize & demonstrate good understanding of home stretch routine to improve AROM, help decrease pain & improve mobility. Initiated 1/10/2025   Demonstrate proper supine or seated diaphragmatic breathing with decreased chest excursion & emphasis on full abdominal excursion & increased expiration time to promote muscle relaxation & increased parasympathetic activity to improve patient tolerance to activities. Initiated 1/10/2025   Verbalize " "good understanding of importance of proper posture in resisted exercise, functional activities & ADL's in order to help reduce postural strain, promote proper breathing. Initiated 1/10/2025   Demonstrate good understanding of full body resisted exercises w/ use of pictures &/or verbal cues to promote independence w/ exercise at the end of the program. Initiated 1/10/2025   Verbalize plan for continuing resisted exercises w/ specific location (i.e. commercial gym, home, community fitness center)  to incorporate all major muscle groups to maintain & progress therapeutic functional improvements. Initiated 1/10/2025   Verbalize difference between  "hurt vs harm"  to demonstrate understanding of fear avoidance in pain cycle.  Initiated 1/10/2025         Midterm: In 8 weeks, pt will:     Verbalize good understanding of activities planning/scheduling (stretching, mindfulness, resisted training, & cardio) prescribed by PT/OT following the end of the program to sustain & progress functional improvements. Initiated 1/10/2025   Perform selected resisted training w/ minimal to no cuing in therapy session to be independent w/ resisted strengthening. Initiated 1/10/2025   Demonstrate improved symptom self-management w/ posture/positioning and mechanical movements and/or implementation of appropriate modalities throughout a typical day.  Initiated 1/10/2025   Demonstrate independence w/ home stretch routine to improve AROM, help decrease pain & improve mobility. Initiated 1/10/2025         Long Term: In 12 weeks, pt will:     Perform selected cardio & resisted training independently to continue safe regular performance of daily exercise. Initiated 1/10/2025   Improve  6 MWT to 395 meters or greater to improve gait speed and mobility. Initiated 1/10/2025   Perform LLE single leg stance 20 seconds or greater bilaterally to improve safety and balance in ADLs. Initiated 1/10/2025   Demonstrate Timed Up & Go (with appropriate assistive " device, if necessary) of 8 seconds or less to decrease fall risk and improve functional mobility. Initiated 1/10/2025   Score 70% or more on ANKLE FOTO to indicate significant functional improvements in impaired functions secondary to pain. Initiated 1/10/2025     Initiated 1/10/2025           PLAN     Continue PT per POC     Yelena Forrest, PTA

## 2025-03-06 NOTE — PROGRESS NOTES
Group Psychotherapy    Site: [facility]    Clinical status of patient: Outpatient    3/6/2025    Length of service:[id number]    Referred by: Functional Restoration Program     Number of patients in attendance: 4    Target symptoms: Chronic Pain Management     Patient's response to intervention:  The patient's response to intervention is active listening.    Progress toward goals and other mental status changes:  The patient's progress toward goals is good.    Plan: group psychotherapy    Topics Covered: Pt attended CBT for Chronic Pain as part of their participation in Functional Restoration. Topics discussed today included a discussion on goal setting and SMART goals. We discussed the role that anxiety, depression and other mood disorders and chronic pain all play in goal setting. Pts filled out 3 worksheets to help them set goals. Will f/u in 1 day.

## 2025-03-06 NOTE — PROGRESS NOTES
"[facility]  Functional Restoration Program  Occupational Therapy Treatment Note  [facility] Day 8    Name: Stephan Wallace  MRN:[id number]  Encounter Date: 3/6/2025    Diagnosis:   Encounter Diagnosis   Name Primary?    Functional movement disorder Yes         Referring provider: Aleksandra Rodriguez NP    Physician Orders: eval and treat; [facility]  Medical Diagnosis:   F44.4 (ICD-10-CM) - Functional neurological symptom disorder with abnormal movement   R53.1,Z74.09,R68.89 (ICD-10-CM) - Decreased strength, endurance, and mobility  R53.83 (ICD-10-CM) - Fatigue, unspecified type  Evaluation Date: 1/10/2025  Insurance Authorization Period Expiration: 2025  Plan of Care Certification Period: 2025  Visit # / Visits authorized:7/10, plus roney  FOTO completed: 1/3      Precautions:  Standard and Fall    Time In: 1345  Time Out: 1430  Total Billable Time: 45 minutes    SUBJECTIVE     She stated: "I don't want to be out of control, and if I let my body do what it wants to do, I would lose control" "I may not come back tomorrow"     Stephan was not compliant with home exercise program given previously.     Response to previous treatment: Ms. Wallace noted: fatigue     Functional change: increased awareness, working on breathing and safety in me messaging.     Pain:       Currently rating pain as 4 /10; worst 9/10 averatge 7/10, best 2-3/10  Location: head/face, mouth, B hand    Description: fatigue, exhaustion    Patient Specific Activities based on Personal goals:  Activity  2025 3/7/2025      Performance (P) Satisfaction (S)  P  S   Driving 5 2     2.  Engaging in [employer] activities 5 4     3.  Engaging in activities outside of work 5 4     4.  Walking (grocery store, in the park/festival 7 3     5.  Chores 6 2               Total Score  5.6 5      Ratin-10; unable/unsatisfied - Able/Satisfied     Objective     Objective Measures updated at progress report unless specified.    COGNITIVE Exam  Behaviors: " pleasant, cooperative. Movements/tics throughout session, in face, UE. Reports difficulties initiation.  Memory: brain fog. Difficulty multitasking. Not tested during eval.  Safety awareness/insight to disability: impaired judgment and impaired insight; pushes through at work, unable to do this outside work.   Coping skills/emotional control: listening to music. During session anxious, but participating.     Visual/Perceptual:  Tracking: attempted to test; difficult tracking due to movements affecting maintaining eye on target.  Saccades: not tested  Acuity: wears glasses  Convergence: impaired, with decreased ability to accommodate.     Dominant hand: right     Active Range of Motion  Upper Extremity 1/10/2025    RUE LUE   Hands WFL WFL   Wrist WFL WFL   Elbows WFL WFL   Shoulders WFL WFL      Upper Extremity Strength - Manual Muscle Testing  Motion Tested 1/10/2025    Right Left    Shoulder Flexion 4+/5 5/5   Shoulder Extension 5/5 5/5   Shoulder Abduction 5/5 5/5   Shoulder IR 5/5 5/5   Shoulder ER 4/5 4+/5   Elbow Flexion 5/5 5/5   Elbow Extension 5/5 5/5       Strength (in pounds, rung II, average of three trials) -   2/26/2025 3/7/2025     Left 40.67 lbs     Right 36.33 lbs     [norms for women aged 35-39: R=74.1 +/-10.8; L=66.3 +/-11.7 (Ed et al, 1985)]     Functional Strength  Pile Testing: Lifting    1/10/2025 (lbs/HR/CHIOMA) 2/26/2025  (lbs/HR/CHIOMA) 3/7/2025     Repetitive Floor to Waist NT due to time 8/nt/5     Repetitive Waist to Shoulder  18/101/4     1- Time Maximum   24/97/5     Carry-2 Handed (40ft)   NT     Work demand Level   TBD Light (#20 max, frequent lifting/carrying #10, 2.5 METS      Reason for stop point   Body mechanics, time needed for reps     comment Increased frustration reported after instruction given. Educated in [employer]. -     The work demand level listed above is based on the physical performance screening test performed. More comprehensive physical testing integrated  with clinical findings would be required to derived an accurate work capacity.      Balance  5 times sit-stand (adults 18-65 y/o) 1/10/2025 3/5/2025    >12 sec= fall risk for general elderly  >16 sec= fall risk for Parkinson's disease  >10 sec= balance/vestibular dysfunction (<61 y/o)  >14.2 sec= balance/vestibular dysfunction (>61 y/o)  >12 sec= fall risk for CVA 13.47 seconds     (without UE used to push up from chair) 17.9 seconds     (With UE on thighs)            Endurance Testing: Modified Ramp Treadmill Test    1/10/2025 3/7/2025    Minutes Completed  3 minutes 5 seconds     % Grades  10 %     Speed (mph)  2.1 mph attempted     METS 4      bpm     Panchito Rating Perceived Exertion Scale   5     Reason for stop point Fatigue, Decline in maintaining pace safely , Increased discomfort     Comments Reports achilles pain left           Limitation/Restriction for FOTO NOC Survey     Therapist reviewed FOTO scores for Stephan ESPARZA Encalade on 2/17/2025.   FOTO documents entered into [employer] - see Media section.     Limitation Score: 40%                 Treatment     Stephan received the treatments listed below:     Therapeutic activities and functional lifting activities in order to increase participation in, endurance for, and performance with vocational, selfcare ADLs, and leisure activities in order to improve quality of life, for 45 minutes, including:    Arrived tardy 2* taking stairs and then eating lunch before initiating session.     Stephan participated in endurance activity using Nustep for 10 minutes, at level 1.0 to improve functional endurance and progress towards increased MET level for improved community mobility and participation, rated as Sort of hard (4/10) exertion.  While on nustep, guided to sensory scan, rated as grounding.     Iniitated stretch routine, marked onset of movement episode limiting participation.   Encouraged to transition to seated on bench to perform physioball roll outs, noted  "increase in movement episode.  Verbal cues for grounding, pt unable to comply.     Offering of physical distance, favored walk away. Significant difficulty with re-engaging.   Therapeutic listening provided, pt sharing frustration and sense of overhwhelm at movements and also at difficulty engaging in tasks at clinic.          No environmental, cultural, spiritual, developmental or education needs expressed or noted.    Patient Education and Home Exercises   Education provided:   - Progress towards goals   - importance of completion of exercises and activities outside of session to attain goals.   - proper posture and body mechanics.  - anticipated muscular soreness following lift testing and strategies for management including stretching, using ice, scheduled rest.  - Functional pain scale  - CHIOMA rate of perceived exertion scale.   - pain cycle  - pursed lip and diaphragmatic breathing techniques  - details of the Functional Restoration Program  - palming  - options for PNS activation: humming, singing out loud.   - hurt vs harm  - safety in me messaging vs danger in me    Written Home Exercises Provided: Patient instructed to cont prior HEP.  Exercises were reviewed and Stephan was able to demonstrate them prior to the end of the session. Stephan demonstrated good  understanding of the education provided.     See EMR under Patient Instructions for exercises provided during therapy sessions. Patient education also located in FRP binder provided on day 1 of the cohort.     Assessment     Ms. Wallace with limited tolerance for today's session, with noted difficulty expressing, rating and managing her elevated state.  Despite apparent tension, declined and unable to engage in grounding activities, favoring instead physical distance and quiet. Noted difficulty with speech and control of movements, though had difficulty engaging in activities that shift focus. Verbalized difficulty with PNS activation as "loss of " "control." Was able to verbalize sense of safety and security in FRP clinic, but states inconsistent desire for change.  Stephan endorsed a tendency to ignore and push through. Overall good session, but will benefit from continued education and assistance in proper pacing.     Stephan is progressing slowly towards her goals and there are no updates to goals at this time. Stephan's prognosis is good, due to motivation, if able to commit to therapy.    Stephan would continue to benefit from skilled outpatient occupational therapy to address the deficits listed in the problem list on initial evaluation, provide patient education, and to maximize patient's level of independence in the home and community environment.     Anticipated barriers to occupational therapy: level of frustrations, h/o PTSD.    Goals:     SHORT Term goals: In 3 weeks, patient will:  Status of goal, reviewed 3/5/2025:   Implements correct use of breathing techniques during functional lifting tasks, with minimal cues needed. Progressing   Utilizes CHIOMA rate of exertion scale to pace performance with functional lifting tasks, and implements self-care strategies during activity participation to manage pain. Progressing   Verbalize understanding of energy conservation and pacing strategies during daily habits, roles, and routines in order to improve tolerance for work and home demands.  Progressing   Completes 5x sit to stand test in 12 seconds or less to improve ability to participate in community mobility.   Progressing   Demonstrate increased positional tolerance to the level needed for work, home, and community activities (standing in cue at social event, managing supplies for outing, streneous IADL), as evidenced by having a METS level of 5. Progressing   Demonstrate proper body mechanics with functional lifting tasks (floor to waist, waist to shoulder, maximum lift, and box carry), via teach back method with minimal cues needed. Progressing "   Demonstrate proper sequencing  when lifting waist to shoulder for management of (I)ADL, including dressing and grooming, placing items in kitchen cabinets, folding towels, and/or managing suitcase when traveling.   Progressing   Demonstrate proper sequencing when lifting floor to waist to meet demand of work/home routine, including lifting groceries, managing laundry, car parts, and/or managing suitcase when traveling.   Progressing   Tolerate Home Activity Plan (HAP)/ Home Exercise Program (HEP) to promote safety and independence with ADL, with report of completion of at least 2 out of 4 days outside of program participation.  Progressing   Show improved perception of own abilities as evidenced by increase of FOTO scores to established MDC value and perception of performance ratings regarding personal long term goals.  Progressing         Long Term goals: In 9 weeks, patient will: Status of goal, reviewed 3/5/2025:   Report implementation of application of mindfulness, stretching, and other coping strategies for improved participation in daily routine. Progressing   Verbalize functional application of lifting techniques in daily life (golfers lift, floor to waist, waist to shoulder). Progressing   Completes 5x sit to stand test in 9 seconds or less to improve ability to participate in community mobility.  Progressing   Applies functional application of lifting techniques (golfer's lift, floor to waist, waist to shoulder) in activities of daily life, per patient report.  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 Progressing   Have an improvement of 3 points in 2/5 personal goals in rating of  performance.  Progressing   Show improved perception of own abilities as evidenced by increase of FOTO  scores to established  II value and perception of performance ratings regarding personal long term goals.  Progressing      Patient Specific Goals; to be met after 2 month of (I) application of tools/techniques learned.  Vocational:  at work   Recreational: engaging in hobby, playing guitar, Sikh activities   Daily Living Activities: driving, chores   Measured by patient's self-reported performance and satisfaction of personal FRP goals, listed above in subjective section.   Total Score = sum of the activity performance scores / number of activities  Minimum detectable change (90%CI) for average score = 2 points  Minimum detectable change (90%CI) for single activity score  = 3 points    Plan     Continue per plan of care.     Updates/Grading for next session: progress as tolerated. REASSESSMENT     MAYRA Mccain   3/5/2025

## 2025-03-06 NOTE — PROGRESS NOTES
Group Psychotherapy    Site: [facility]    Clinical status of patient: Outpatient    3/5/2025    Length of service:[id number]    Referred by: Functional Restoration Program     Number of patients in attendance: 5    Target symptoms: Chronic Pain Management     Patient's response to intervention:  The patient's response to intervention is active listening.    Progress toward goals and other mental status changes:  The patient's progress toward goals is good.    Plan: group psychotherapy    Topics Covered: Pt attended CBT for Chronic Pain as part of their participation in Functional Restoration. Topics discussed today included a discussion on inflammation and the role it plays in chronic pain. We also discussed anxiety, depression and other mood disorders and how they impact and are impacted by chronic pain. Will f/u in 1 day.

## 2025-03-07 ENCOUNTER — CLINICAL SUPPORT (OUTPATIENT)
Dept: REHABILITATION | Facility: OTHER | Age: 35
End: 2025-03-07
Payer: COMMERCIAL

## 2025-03-07 ENCOUNTER — CLINICAL SUPPORT (OUTPATIENT)
Dept: PSYCHIATRY | Facility: OTHER | Age: 35
End: 2025-03-07
Payer: COMMERCIAL

## 2025-03-07 DIAGNOSIS — G25.9 FUNCTIONAL MOVEMENT DISORDER: Primary | ICD-10-CM

## 2025-03-07 DIAGNOSIS — R68.89 DECREASED FUNCTIONAL ACTIVITY TOLERANCE: Primary | ICD-10-CM

## 2025-03-07 DIAGNOSIS — F41.0 GENERALIZED ANXIETY DISORDER WITH PANIC ATTACKS: Primary | ICD-10-CM

## 2025-03-07 DIAGNOSIS — F41.1 GENERALIZED ANXIETY DISORDER WITH PANIC ATTACKS: Primary | ICD-10-CM

## 2025-03-07 PROCEDURE — 90853 GROUP PSYCHOTHERAPY: CPT | Mod: ,,, | Performed by: SOCIAL WORKER

## 2025-03-07 PROCEDURE — 97530 THERAPEUTIC ACTIVITIES: CPT

## 2025-03-07 PROCEDURE — 97110 THERAPEUTIC EXERCISES: CPT

## 2025-03-07 NOTE — PROGRESS NOTES
"[facility]  Functional Restoration Program  Occupational Therapy Treatment Note  P Day 10    Name: Stephan Wallace  MRN:[id number]  Encounter Date: 3/10/2025    Diagnosis:   Encounter Diagnosis   Name Primary?    Functional movement disorder Yes     Referring provider: Aleksandra Rodriguez NP    Physician Orders: eval and treat; [facility]  Medical Diagnosis:   F44.4 (ICD-10-CM) - Functional neurological symptom disorder with abnormal movement   R53.1,Z74.09,R68.89 (ICD-10-CM) - Decreased strength, endurance, and mobility  R53.83 (ICD-10-CM) - Fatigue, unspecified type  Evaluation Date: 1/10/2025  Insurance Authorization Period Expiration: 12/31/2025  Plan of Care Certification Period: 4/21/2025  Visit # / Visits authorized: 9/10, plus roney  FOTO completed: 1/3      Precautions:  Standard and Fall    Time In: 13:35  Time Out: 14:31  Total Billable Time: 55 minutes    SUBJECTIVE     She stated: "I need to keep myself safe, and as soon as I walk to the clinic I feel more unsafe, and I need to do whatever it is to protect myself". "My laughing is out fear". "I would never go outside, because of depression". "I used the ball, but it only helped me feel better for a short while".    Stephan was compliant with parts pf home exercise program given previously. Attempted some of the stretch routine, has done breath work, and did some walking.    Response to previous treatment: Ms. Wallace noted: fatigue, frustration.    Functional change: increased awareness, working on breathing and safety in me messaging.     Pain:     Currently rating pain as 4/10 at start of session, improved to 2/10 at end of session.  Functional Pain Scale Rating 0-10: within the last 24 hours  Date 1/10/2025 2/17/2025 3/6/2025   Average 5/10 5/10 7/10   Worst 7/10 8/10 9/10   Best 1/10 3/10 3/10   Composite score 4.33/10 5.33/10 6.33/10   [JESUS  is 1 point or 15-20% change in interference composite score (Cleo et al. 2008)]     Location: " head/face, mouth, B hand    Description: fatigue, exhaustion  Aggravating Factors: anxiety, depression, stress    Easing Factors: anxiety meds     Patient Specific Activities based on Personal goals:  Activity  2025    Performance (P) Satisfaction (S)   Driving 5 2   2.  Engaging in Jew activities 5 4   3.  Engaging in activities outside of work 5 4   4.  Walking (grocery store, in the park/festival 7 3   5.  Chores 6 2           Total Score  5.6 5    Ratin-10; unable/unsatisfied - Able/Satisfied     Objective     Objective Measures updated at progress report unless specified.    COGNITIVE Exam  Behaviors: pleasant, cooperative. Movements/tics throughout session, in face, UE. Reports difficulties initiation.  Memory: brain fog. Difficulty multitasking. Not tested during eval.  Safety awareness/insight to disability: impaired judgment and impaired insight; pushes through at work, unable to do this outside work.   Coping skills/emotional control: listening to music. During session anxious, but participating.     Visual/Perceptual:  Tracking: attempted to test; difficult tracking due to movements affecting maintaining eye on target.  Saccades: not tested  Acuity: wears glasses  Convergence: impaired, with decreased ability to accommodate.     Dominant hand: right     Active Range of Motion  Upper Extremity 1/10/2025    RUE LUE   Hands WFL WFL   Wrist WFL WFL   Elbows WFL WFL   Shoulders WFL WFL      Upper Extremity Strength - Manual Muscle Testing  Motion Tested 1/10/2025    Right Left    Shoulder Flexion 4+/5 5/5   Shoulder Extension 5/5 5/5   Shoulder Abduction 5/5 5/5   Shoulder IR 5/5 5/5   Shoulder ER 4/5 4+/5   Elbow Flexion 5/5 5/5   Elbow Extension 5/5 5/5       Strength (in pounds, rung III, average of three trials)   2025 3/7/2025     Left 40.67 lbs 55.33 lbs   Right 36.33 lbs 62.33 lbs    [norms for women aged 35-39: R=74.1 +/-10.8; L=66.3 +/-11.7 (Ed et al, 1985)]     Functional  Strength  Pile Testing: Lifting    1/10/2025 (lbs/HR/PANCHITO) 2/26/2025  (lbs/HR/PANCHITO) 3/7/2025   (lbs/HR/PANCHITO)    Repetitive Floor to Waist NT due to time 8/nt/5 8/109/5   Repetitive Waist to Shoulder  18/101/4 13/107/4    1- Time Maximum   24/97/5 NT   Carry-2 Handed (40ft)   NT NT   Work demand Level   TBD Light (#20 max, frequent lifting/carrying #10, 2.5 METS      Reason for stop point   Body mechanics, time needed for reps Body mechanics, time needed for reps    comment Increased frustration reported after instruction given. Educated in [facility]. -  Shoulder elevation noted.   The work demand level listed above is based on the physical performance screening test performed. More comprehensive physical testing integrated with clinical findings would be required to derived an accurate work capacity.      Balance  5 times sit-stand (adults 18-65 y/o) 1/10/2025 3/6/2025    >12 sec= fall risk for general elderly  >16 sec= fall risk for Parkinson's disease  >10 sec= balance/vestibular dysfunction (<59 y/o)  >14.2 sec= balance/vestibular dysfunction (>59 y/o)  >12 sec= fall risk for CVA 13.47 seconds     (without UE used to push up from chair) 17.9 seconds     (With UE on thighs)            Endurance Testing: Modified Ramp Treadmill Test    1/10/2025 3/7/2025    Minutes Completed  3 minutes 5 seconds  3 minutes   % Grades  10 %  10%   Speed (mph)  2.1 mph attempted  1.7 mph   METS 4  4    bpm  122 bpm   Panchito Rating Perceived Exertion Scale   5  4   Reason for stop point Fatigue, Decline in maintaining pace safely , Increased discomfort  Increased discomfort   Comments Reports achilles pain left  -          Limitation/Restriction for FOTO NOC Survey     Therapist reviewed FOTO scores for Stephan D Encalade on 2/17/2025.  FOTO documents entered into [employer] - see Media section.     Limitation Score: 40%                 Treatment   Stephan received the treatments listed below:     Therapeutic activities and functional  lifting activities in order to increase participation in, endurance for, and performance with vocational, selfcare ADLs, and leisure activities in order to improve quality of life, for 23 minutes, including:    Ball bounce, wall push, laughing, game,  tool, all to participate giving opportunity to check in with self, and use CHIOMA scale to pace, and with hope to elicit breath out longer than inhale, for PNS activation.    Therapeutic exercises to develop strength, endurance, ROM, flexibility, posture, and core stabilization for 23 minutes, including:    Stephan participated in endurance activity using Nustep for 23 minutes, starting at level 1 to 1 to improve functional endurance and progress towards increased MET level for improved community mobility and participation, rated as Moderate (3/10) exertion, Stephan re-educated on how to add mindfulness component to activity and how to pace appropriately by completed self check ins and grading activity as needed, with goal to reach moderate to sort of hard by end of activity. Completed 1939 steps; elevation 1838'.    No environmental, cultural, spiritual, developmental or education needs expressed or noted.    Patient Education and Home Exercises   Education provided:   - Progress towards goals   - importance of completion of exercises and activities outside of session to attain goals.   - proper posture and body mechanics.  - anticipated muscular soreness following lift testing and strategies for management including stretching, using ice, scheduled rest.  - Functional pain scale  - CHIOMA rate of perceived exertion scale.   - pain cycle  - pursed lip and diaphragmatic breathing techniques  - details of the Functional Restoration Program  - palming  - options for PNS activation: humming, singing out loud.   - hurt vs harm  - safety in me messaging vs danger in me  - brain/body connected    Written Home Exercises Provided: Patient instructed to cont prior  HEP.  Stephan demonstrated poor understanding of the education provided.     See EMR under Patient Instructions for exercises provided during therapy sessions. Patient education also located in FRP binder provided on day 1 of the cohort.     Assessment     Ms. Wallace open to sharing regarding fear, frustration, and open to engage in all activities offered today. At times with attempt to hold and control movements, but for most part of session, able to have eye contact. Was open to education, and shared having used the beach ball at home for bouncing, as well as walking outside and doing some of the stretches and breath work. Some cues given to help regulate pacing; wanted to return to task although still outside of the green according to CHIOMA scale. Overall, this was a very good session!    Stephan is progressing towards her goals and status of goals can be seen below. Stephan's prognosis is fair, pending ability to  receiving and understanding education and participating in therapeutic intervention.     Stephan would continue to benefit from skilled outpatient occupational therapy to address the deficits listed in the problem list on initial evaluation, provide patient education, and to maximize patient's level of independence in the home and community environment.     Anticipated barriers to occupational therapy: level of frustrations, h/o PTSD.    Goals:     SHORT Term goals: In 3 weeks, patient will:  Status of goal, reviewed 3/7/2025:   Implements correct use of breathing techniques during functional lifting tasks, with minimal cues needed. Progressing   Utilizes CHIOMA rate of exertion scale to pace performance with functional lifting tasks, and implements self-care strategies during activity participation to manage pain. NOT MET   Verbalize understanding of energy conservation and pacing strategies during daily habits, roles, and routines in order to improve tolerance for work and home demands.  NOT MET    Completes 5x sit to stand test in 12 seconds or less to improve ability to participate in community mobility.   NOT MET   Demonstrate increased positional tolerance to the level needed for work, home, and community activities (standing in cue at social event, managing supplies for outing, streneous IADL), as evidenced by having a METS level of 5. NOT MET   Demonstrate proper body mechanics with functional lifting tasks (floor to waist, waist to shoulder, maximum lift, and box carry), via teach back method with minimal cues needed. NOT MET   Demonstrate proper sequencing  when lifting waist to shoulder for management of (I)ADL, including dressing and grooming, placing items in kitchen cabinets, folding towels, and/or managing suitcase when traveling.   Progressing   Demonstrate proper sequencing when lifting floor to waist to meet demand of work/home routine, including lifting groceries, managing laundry, car parts, and/or managing suitcase when traveling.   NOT MET   Tolerate Home Activity Plan (HAP)/ Home Exercise Program (HEP) to promote safety and independence with ADL, with report of completion of at least 2 out of 4 days outside of program participation.  Progressing   Show improved perception of own abilities as evidenced by increase of FOTO scores to established MDC value and perception of performance ratings regarding personal long term goals.  Progressing         Long Term goals: In 9 weeks, patient will: Status of goal, reviewed 3/7/2025:   Report implementation of application of mindfulness, stretching, and other coping strategies for improved participation in daily routine. Progressing   Verbalize functional application of lifting techniques in daily life (golfers lift, floor to waist, waist to shoulder). Progressing   Completes 5x sit to stand test in 9 seconds or less to improve ability to participate in community mobility.  Progressing   Applies functional application of lifting techniques (golfer's  lift, floor to waist, waist to shoulder) in activities of daily life, per patient report.  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 Progressing   Have an improvement of 3 points in 2/5 personal goals in rating of  performance.  Progressing   Show improved perception of own abilities as evidenced by increase of FOTO scores to established MC II value and perception of performance ratings regarding personal long term goals.  Progressing      Patient Specific Goals; to be met after 2 month of (I) application of tools/techniques learned.  Vocational:  at work   Recreational: engaging in hobby, playing guitar, Rastafari activities   Daily Living Activities: driving, chores   Measured by patient's self-reported performance and satisfaction of personal FRP goals, listed above in subjective section.   Total Score = sum of the activity performance scores / number of activities  Minimum detectable change (90%CI) for average score = 2 points  Minimum detectable change (90%CI) for single activity score  = 3 points    Plan     Continue per plan of care.     Updates/Grading for next session: progress as tolerated, rate activities related to personal goals, f/u energy conservation and pacing.    DAYO Sheehan, OTR/L, CEAS-I  3/10/2025

## 2025-03-07 NOTE — PROGRESS NOTES
"[facility]    Functional Restoration Program  Physical Therapy Treatment Note  FRP Day 9    Name: Stephan Wallace  MRN:[id number]      Therapy Diagnosis:   Encounter Diagnosis   Name Primary?    Decreased functional activity tolerance Yes     Physician: Aleksandra Rodriguez NP    Date: 3/7/2025    Physician Orders: PT Eval and Treat   Medical Diagnosis from Referral:   F44.4 (ICD-10-CM) - Functional neurological symptom disorder with abnormal movement   R53.1,Z74.09,R68.89 (ICD-10-CM) - Decreased strength, endurance, and mobility   R53.83 (ICD-10-CM) - Fatigue, unspecified type      Evaluation Date: 1/10/2025  Authorization Period Expiration: 12/23/25  Plan of Care Expiration: 04/04/25  Visit # / Visits authorized: 07/10   FOTO: 02/ 03       Precautions: Standard     Time In: 1:35 pm (late arrival from bathroom)   Time Out: 2:35 pm  Total Appointment Time (timed & untimed codes): 60 minutes      SUBJECTIVE     Stephan reports no sig changes in sx presentation or /c her activities/behaviors since evaluation. Patient report finding no role for stretching in her life and states "stretching stress me out".  Patient admit she has not changed her pattern of going to work then going home to her bed and laying down.  In terms of addressing personal goal, patient report has not attempted picking up her guitar noting mental challenge as barrier vs physical difficulty.   Patient report her sleep is disturbed and clarifying that "it is my brain" that keeps her awake vs any noted pain. Patient state inability to respond to reassessment questions like "how are tx expectations in reality vs what you thought at [facility]" and "have you been able to use the time driving from work/tx to home as a timeframe for mindfulness or music or podcasts".   However, patient report incidences of controlled breathing during the day leading to "it helps" but is not able to express how it helps.    Patient report tolerating last " "session well /c no c/o of excess discomfort.  Patient agree to tx today.        Patient's FRP goals:  "go back to normalcy" dec random movements, improved speech pattern     From past sessions:   FRP seems to bring on more frustration.   Work is very stressful.  Self motivation is difficult      Current 5/10  Location(s): facial  and UE symptoms, effected speech    OBJECTIVE     Objective Measures updated at progress report unless specified.      Postural examination:  Seated: slouched   Standing:  inc R WB      Range of Motion - Cervical    AROM AROM AROM     1/10/2025 Reassessment  03/07/25   Reassessment   Flexion WFL ° WFL° °   Extension Min loss ° Min loss° °   Side bending Right Min loss ° Min loss° °   Side bending Left Min loss ° Min loss° °   Rotation Right WFL ° Min loss° °   Rotation Left WFL  ° Min loss° °      Range of Motion - Lumbar    1/10/2025  03/07/25        ROM Loss ROM Loss ROM Loss   Flexion within functional limits  WFL     Extension within functional limits  patient refuse, unable to report why       Side bending Right minimal loss  WFL     Side bending Left minimal loss  WFL     Rotation Right minimal loss  min loss     Rotation Left minimal loss  min loss        Strength Testing             1/10/2025 Reassessment  03/07/25  Reassessment   Motion Tested               Lower Extremity R L R L R L   Hip Flexion 4+/5 4+/5  4+/5 4+/5        Hip Extension 4/5 4/5 4+/5  4+/5        Hip Abduction seated 4+/5 4+/5 4+/5 4+/5       Hip IR 4+/5 4+/5  4/5 4+/5        Knee Extension 5/5 5/5  5/5 5/5        Knee Flexion 5/5 5/5 5/5  5/5           Functional Testing       1/10/2025 Reassessment  03/07/25  Reassessment   Timed Up and Go 9.65 sec 11.62, 11.19 sec sec   Single Limb Stance R LE 20 sec fell when cued to "breathe" > 30 sec sec   Single Limb Stance L LE 4.54 sec 17.79 sec sec   2 Minute Walk Test 146 meters 127 m feet   6 Minute Walk Test 372 meters 383 m feet   Self Selected Walking Speed 1.03 " m/sec 1.06 m/sec m/sec      GAIT:  Assistive Device used: none  Level of Assistance: independent  Patient displays the following gait deviations:  dec reciprocal arm swing, antalgic gait. L ankle (dec L WB, dec R step)      Minimum standards/norms:    TUG:  < 13.5 seconds/ Males 7.3 sec, Females 8.1 sec  SLS:  26-29 sec  Ages 20-69  Self Selected Walking Speed:  .4-.8m/sec  Limited community ambulator                                                   .8- 1.2  Community ambulator                                                    1.2 m/sec and above  Able to safely cross streets           Limitation/Restriction for FOTO ANKLE Survey     Therapist reviewed FOTO scores for Stephan Gagnonalade on 1/10/2025.   FOTO documents entered into [employer] - see Media section.     INTAKE Score 1/10/2025: 63% (53/80 LEFS)   03/07/25: 65% (58/80 LEFS)      Predicted Score: 70%     MDC = 9 points             Treatment       Stephan received the Individualized Treatments listed below:     Stephan participated in dynamic functional therapeutic activities to improve functional performance for 55  minutes, including:      With direct attention and cueing:   Full body functional mobility w/ 3-10 sec hold technique &/or 3-5 repetition technique to improve functional performance. In order to:   Improve driving safety, visual & spatial awareness:  Cervical flx/ext  Cervical side bending  Cervical rotation  Cervical protraction/retraction  Improve lifting mechanics, showering/bathing, dressing, cooking, reaching, pushing & fine motor skills  Shld rolls  Shld depression/elevation  Scapular retraction/protraction/scaption  Posterior shld stretch (cross arm)  Shld ER stretch (chicken wing)  Elbow flx/ext  Forearm supination/pronation  Wrist flx/ext  Open/close hands/fingers  Improve bed mobility & rolling, bending over, cooking & cleaning:  Seated trunk rotations  Seated trunk/thoracic ext/flx    NP:   Improve functional gait, squatting, sitting  tolerance, functional balance:   Seated marches & knee to chest  Seated knee flx/ext  Seated hip ER/piriformis stretch  Seated hamstring stretch  Seated toe raise to heel raise   Seated ankle circles each way  Improve overhead reaching/activities, standing tolerance:  Standing lumbar extensions  Standing lateral leaning stretch  Standing quad stretch (UE support for balance)      PT reassessment (see above)     NP:   Functional Activity Endurance   DATE ACTIVITY DURATION DETAILS OTHER/PRE   2/21/25 SciFit 12 min Level 1 3/10   02/26/25 SciFit 19 min Level 1 4/10   02/28/25 SciFit 10:30 min Level 1  3/10   02/28/25 Hallway ambulation 5 min  4/10   3/5/25 Sci Fit 20 min Level 1, no UE's 4/10            Mindfulness:   Mindfulness-based activity involving deep breathing, mindful awareness of the body and headspace  Used to activate parasympathetic nervous system to decrease autonomic threat perception and thus reduce central sensitivity to pain  Patient demonstrate safe performance       Betitomin participated in neuromuscular re-education activities to improve: Balance, Coordination, Kinesthetic, Sense, Proprioception, and Posture for 5 minutes. The following activities were included:    Patient reassessment (see above)     Fitter Board: 20 x2 taps             PRE 4/10   Lat - single HH, uneven taps, cues for decrease lean     Ant/post - B HH, cues for decrease for/back trunk movement, increased discomfort in achilles      Peripheral muscle strengthening which included 1-2 sets of 10-20 repetitions at a slow, controlled 5-7 second per rep pace focused on strengthening supporting musculature for improved body mechanics & functional mobility.  Patient & therapist focused on proper form & speed during treatment to ensure optimal strengthening of each targeted muscle group & neuromuscular re-education. Patients are instructed to keep sets/reps with proper load to stay at 3-5 out of 10 on the provided modified Panchito exertion  scale.    [employer] Machine Exercises Weight (lbs) Sets Repetitions Panchito Exertion Scale Rating   Leg Extension  7.5  15 7   Hamstring Curls 15  15 4.5   Chest Press       Pec Fly       Lat Pull Down       Mid Row 20  20 3   Bicep Bar Curls       Standing Tricep Extension       Single Leg Press  R/L 17.5  15 4   *tightness    NP  Somatic Tracking:  Mindfulness-based, guided imagery to reduce fear-avoidance and catastrophizing around physical symptoms in body  Guided patient through attending to negative (painful), neutral, and positive sensations in the body  Used imagery and metaphors to reduce fear and hyperfixation on symptoms  Offered patient permission not to control symptoms, allow them to happen for short period  Cued patient for feeling where frustration showed up somatically in her body    Patient Education and Home Exercises     Home Exercises Provided and Patient Education Provided     Education provided:   - FRP Educational Topics: Physical & Psychological Pain Cycles, Diaphragmatic Breathing, Activity Pacing for Pain & Debility , Resisted Exercise Basics, Mindfulness Resources, and Posture & Body Mechanics      Written Home Exercises Provided: Patient instructed to cont prior HEP. Exercises were reviewed and Stephan was able to demonstrate them prior to the end of the session.  Stephan demonstrated good  understanding of the education provided. See EMR under Patient Instructions for information provided during therapy sessions    ASSESSMENT     Patient /c fair participation in tx today.  Patient require full guidance /c stretch program /c cueing for coordinated movement and motivation indicating non compliance /c FRP tenet of attempting stretches daily. This is supported /c patient subjective report stating no performance of stretch program outside of tx.  Updated FOTO score mirror this /c no sig change in score.  Tx review Pain Cycles today /c patient expressing understanding that her primary barriers  "reside in the Psychological Cycle.  Supporting this - Functional activity assessment /c no notable decline indicating no strength or mobility loss.  Overall physical measures show no sig deficit, however, continued strengthening and mobility exercises are warranted to drive inc self efficacy and elements of stress relief thereby addressing patient awareness of psychological pain cycles.    Tx include further discussion on how our team provides education and training aimed at improving physical function in addition to emotional health, stress and pain coping skills.Treatment is designed to build confidence in physical activity and ADLs and in your ability to control and manage your pain.  Patient will need consistent reinforcement of education on impact of psychosocial stressors on sx presentation.  Recommend particular notice of "no change makes no change".    Stephan Is progressing poor to fair towards her goals.   Pt prognosis is Good.     Pt will continue to benefit from skilled outpatient physical therapy to address the deficits listed in the problem list box on initial evaluation, provide pt/family education and to maximize pt's level of independence in the home and community environment.     Pt's spiritual, cultural and educational needs considered and pt agreeable to plan of care and goals.     Anticipated Barriers for therapy: chronic nature of issues, psychosocial factors, central sensitization    GOALS: Pt is in agreement with the following goals:  Goal Progress Towards Goal   SHORT Term: In 3 weeks, pt will:     Verbalize & demonstrate good understanding of resisted training with 3-1-3 second rep for [id number] contraction to encourage full muscle recruitment for strength & endurance. Progressing 03/07/25   Verbalize & demonstrate good understanding of home stretch routine to improve AROM, help decrease pain & improve mobility. Progressing 03/07/25   Demonstrate proper supine or seated " "diaphragmatic breathing with decreased chest excursion & emphasis on full abdominal excursion & increased expiration time to promote muscle relaxation & increased parasympathetic activity to improve patient tolerance to activities. Progressing 03/07/25   Verbalize good understanding of importance of proper posture in resisted exercise, functional activities & ADL's in order to help reduce postural strain, promote proper breathing. Progressing 03/07/25   Demonstrate good understanding of full body resisted exercises w/ use of pictures &/or verbal cues to promote independence w/ exercise at the end of the program. Progressing 03/07/25   Verbalize plan for continuing resisted exercises w/ specific location (i.e. commercial gym, home, community fitness center)  to incorporate all major muscle groups to maintain & progress therapeutic functional improvements. Progressing 03/07/25   Verbalize difference between  "hurt vs harm"  to demonstrate understanding of fear avoidance in pain cycle.  Progressing 03/07/25         Midterm: In 8 weeks, pt will:     Verbalize good understanding of activities planning/scheduling (stretching, mindfulness, resisted training, & cardio) prescribed by PT/OT following the end of the program to sustain & progress functional improvements. Progressing 03/07/25   Perform selected resisted training w/ minimal to no cuing in therapy session to be independent w/ resisted strengthening. Progressing 03/07/25   Demonstrate improved symptom self-management w/ posture/positioning and mechanical movements and/or implementation of appropriate modalities throughout a typical day.  Progressing 03/07/25   Demonstrate independence w/ home stretch routine to improve AROM, help decrease pain & improve mobility. Progressing 03/07/25         Long Term: In 12 weeks, pt will:     Perform selected cardio & resisted training independently to continue safe regular performance of daily exercise. Progressing 03/07/25 "   Improve  6 MWT to 395 meters or greater to improve gait speed and mobility. Progressing 03/07/25   Perform LLE single leg stance 20 seconds or greater bilaterally to improve safety and balance in ADLs. Progressing 03/07/25   Demonstrate Timed Up & Go (with appropriate assistive device, if necessary) of 8 seconds or less to decrease fall risk and improve functional mobility. Progressing 03/07/25   Score 70% or more on ANKLE FOTO to indicate significant functional improvements in impaired functions secondary to pain. Progressing 03/07/25                PLAN     Continue PT per [facility], PT

## 2025-03-07 NOTE — PROGRESS NOTES
"[facility]  Functional Restoration Program  Occupational Therapy Treatment Note  P Day 9    Name: Stephan Wallace  MRN:[id number]  Encounter Date: 3/7/2025    Diagnosis:   Encounter Diagnosis   Name Primary?    Functional movement disorder Yes     Referring provider: Aleksandra Rodriguez NP    Physician Orders: eval and treat; [facility]  Medical Diagnosis:   F44.4 (ICD-10-CM) - Functional neurological symptom disorder with abnormal movement   R53.1,Z74.09,R68.89 (ICD-10-CM) - Decreased strength, endurance, and mobility  R53.83 (ICD-10-CM) - Fatigue, unspecified type  Evaluation Date: 1/10/2025  Insurance Authorization Period Expiration: 12/31/2025  Plan of Care Certification Period: 4/21/2025  Visit # / Visits authorized: 8/10, plus roney  FOTO completed: 1/3      Precautions:  Standard and Fall    Time In: 14:35  Time Out: 15:30  Total Billable Time: 55 minutes    SUBJECTIVE     She stated: "I was told to be honest; you are frustrating me".     Stephan was not compliant with home exercise program given previously.     Response to previous treatment: Ms. Wallace noted: fatigue, frustration.    Functional change: increased awareness, working on breathing and safety in me messaging.     Pain:       Pain Related Behaviors Observed: yes; currently rating pain as 4/10  Functional Pain Scale Rating 0-10: within the last 24 hours  Date 1/10/2025 2/17/2025 3/6/2025   Average 5/10 5/10 7/10   Worst 7/10 8/10 9/10   Best 1/10 3/10 3/10   Composite score 4.33/10 5.33/10 6.33/10   [JESUS  is 1 point or 15-20% change in interference composite score (Aleshiain et al. 2008)]     Location: head/face, mouth, B hand    Description: fatigue, exhaustion  Aggravating Factors: anxiety, depression, stress    Easing Factors: anxiety meds     Patient Specific Activities based on Personal goals:  Activity  2/17/2025    Performance (P) Satisfaction (S)   Driving 5 2   2.  Engaging in Anabaptism activities 5 4   3.  Engaging in activities " outside of work 5 4   4.  Walking (grocery store, in the park/festival 7 3   5.  Chores 6 2           Total Score  5.6 5    Ratin-10; unable/unsatisfied - Able/Satisfied     Objective     Objective Measures updated at progress report unless specified.    COGNITIVE Exam  Behaviors: pleasant, cooperative. Movements/tics throughout session, in face, UE. Reports difficulties initiation.  Memory: brain fog. Difficulty multitasking. Not tested during eval.  Safety awareness/insight to disability: impaired judgment and impaired insight; pushes through at work, unable to do this outside work.   Coping skills/emotional control: listening to music. During session anxious, but participating.     Visual/Perceptual:  Tracking: attempted to test; difficult tracking due to movements affecting maintaining eye on target.  Saccades: not tested  Acuity: wears glasses  Convergence: impaired, with decreased ability to accommodate.     Dominant hand: right     Active Range of Motion  Upper Extremity 1/10/2025    RUE LUE   Hands WFL WFL   Wrist WFL WFL   Elbows WFL WFL   Shoulders WFL WFL      Upper Extremity Strength - Manual Muscle Testing  Motion Tested 1/10/2025    Right Left    Shoulder Flexion 4+/5 5/5   Shoulder Extension 5/5 5/5   Shoulder Abduction 5/5 5/5   Shoulder IR 5/5 5/5   Shoulder ER 4/5 4+/5   Elbow Flexion 5/5 5/5   Elbow Extension 5/5 5/5       Strength (in pounds, rung III, average of three trials)   2025 3/7/2025     Left 40.67 lbs 55.33 lbs   Right 36.33 lbs 62.33 lbs    [norms for women aged 35-39: R=74.1 +/-10.8; L=66.3 +/-11.7 (Ed et al, 1985)]     Functional Strength  Pile Testing: Lifting    1/10/2025 (lbs/HR/CHIOMA) 2025  (lbs/HR/CHIOMA) 3/7/2025   (lbs/HR/CHIOMA)    Repetitive Floor to Waist NT due to time //5   Repetitive Waist to Shoulder  /4  107/4    1- Time Maximum   /5 NT   Carry-2 Handed (40ft)   NT NT   Work demand Level   TBD Light (#20 max, frequent  lifting/carrying #10, 2.5 METS      Reason for stop point   Body mechanics, time needed for reps Body mechanics, time needed for reps    comment Increased frustration reported after instruction given. Educated in markie. -  Shoulder elevation noted.   The work demand level listed above is based on the physical performance screening test performed. More comprehensive physical testing integrated with clinical findings would be required to derived an accurate work capacity.      Balance  5 times sit-stand (adults 18-65 y/o) 1/10/2025 3/6/2025    >12 sec= fall risk for general elderly  >16 sec= fall risk for Parkinson's disease  >10 sec= balance/vestibular dysfunction (<59 y/o)  >14.2 sec= balance/vestibular dysfunction (>59 y/o)  >12 sec= fall risk for CVA 13.47 seconds     (without UE used to push up from chair) 17.9 seconds     (With UE on thighs)            Endurance Testing: Modified Ramp Treadmill Test    1/10/2025 3/7/2025    Minutes Completed  3 minutes 5 seconds  3 minutes   % Grades  10 %  10%   Speed (mph)  2.1 mph attempted  1.7 mph   METS 4  4    bpm  122 bpm   Panchito Rating Perceived Exertion Scale   5  4   Reason for stop point Fatigue, Decline in maintaining pace safely , Increased discomfort  Increased discomfort   Comments Reports achilles pain left  -          Limitation/Restriction for FOTO NOC Survey     Therapist reviewed FOTO scores for Stephan D Encalade on 2/17/2025.   FOTO documents entered into [email or web address] - see Media section.     Limitation Score: 40%                 Treatment     Stephan received the treatments listed below:     Therapeutic activities and functional lifting activities in order to increase participation in, endurance for, and performance with vocational, selfcare ADLs, and leisure activities in order to improve quality of life, for 25 minutes, including:    Taking of measures today. During this, use of ball toss, and beach ball given to take home for ball toss against wall, for  PNS activation. Rated as beneficial, but still feeling frustrated.     Neuromuscular re-education activities to improve Coordination, Kinesthetic, Sense, and Proprioception for 25 minutes. The following activities were included:    Mindfulness activity using preeti chi using video guidance, in standing, without PNS activation as keeping tense throughout, without attempt to follow movements.    Use of vibration ball, wrapped in towel, held against mat, for stimulation of proprioceptive functioning, with shoulder tension decreasing, but Stephan reporting it to have moved into the elbow. Asked to call out vegetable names, tire brands, at the same time; but not following through.    While seated, use of  for activation of proprioception functioning, in left hand. Moved to right hand, but unable to attend to task.     Transferred to supine on mat, for breath work for PNS activation, with 2 lbs placed on abdomen for biofeedback.    Therapeutic exercises to develop endurance, ROM, and flexibility for 5 minutes, including:    Stephan participated in endurance activity using Nustep for 5 minutes, at level 1.0 to improve functional endurance and progress towards increased MET level for improved community mobility and participation, exertion not rated. Hand over hand assistance provided for grasp (R) hand; increased tension continued in shoulder noted, and cessation of this part subsequently. Again, asked to multi task to create cerebral confusion, without output given.    No environmental, cultural, spiritual, developmental or education needs expressed or noted.    Patient Education and Home Exercises   Education provided:   - Progress towards goals   - importance of completion of exercises and activities outside of session to attain goals.   - proper posture and body mechanics.  - anticipated muscular soreness following lift testing and strategies for management including stretching, using ice, scheduled rest.  -  Functional pain scale  - CHIOMA rate of perceived exertion scale.   - pain cycle  - pursed lip and diaphragmatic breathing techniques  - details of the Functional Restoration Program  - palming  - options for PNS activation: humming, singing out loud.   - hurt vs harm  - safety in me messaging vs danger in me    Written Home Exercises Provided: Patient instructed to cont prior HEP.  Stephan demonstrated poor understanding of the education provided.     See EMR under Patient Instructions for exercises provided during therapy sessions. Patient education also located in FRP binder provided on day 1 of the cohort.     Assessment     Ms. Gagnonalade with frustration throughout session, with tension eased during ball toss. Measures taken today do not show improvement, and pain composite score decreased since eval, except for hand , now meeting norms age group considering standard deviation. Limited tolerance for session, observed to attempt to control physical output, which results in increased tension and increase in non functioning movements in UE. Although hesitant, does participate in activities, but due to continues focus on controlling her output, not benefiting from therapeutic intervention to the extend that it motivates repetition or additional attempts.    Stephan is not progressing well towards her goals and status of goals can be seen below. Stephan's prognosis is poor, due to difficulty being vulnerable in session, and being open to receiving and participating in therapeutic intervention. It is unclear what changes Stephan has made to date to reach stated goals.     Stephan would continue to benefit from skilled outpatient occupational therapy to address the deficits listed in the problem list on initial evaluation, provide patient education, and to maximize patient's level of independence in the home and community environment.     Anticipated barriers to occupational therapy: level of frustrations, h/o  PTSD.    Goals:     SHORT Term goals: In 3 weeks, patient will:  Status of goal, reviewed 3/7/2025:   Implements correct use of breathing techniques during functional lifting tasks, with minimal cues needed. Progressing   Utilizes CHIOMA rate of exertion scale to pace performance with functional lifting tasks, and implements self-care strategies during activity participation to manage pain. NOT MET   Verbalize understanding of energy conservation and pacing strategies during daily habits, roles, and routines in order to improve tolerance for work and home demands.  NOT MET   Completes 5x sit to stand test in 12 seconds or less to improve ability to participate in community mobility.   NOT MET   Demonstrate increased positional tolerance to the level needed for work, home, and community activities (standing in cue at social event, managing supplies for outing, streneous IADL), as evidenced by having a METS level of 5. NOT MET   Demonstrate proper body mechanics with functional lifting tasks (floor to waist, waist to shoulder, maximum lift, and box carry), via teach back method with minimal cues needed. NOT MET   Demonstrate proper sequencing  when lifting waist to shoulder for management of (I)ADL, including dressing and grooming, placing items in kitchen cabinets, folding towels, and/or managing suitcase when traveling.   Progressing   Demonstrate proper sequencing when lifting floor to waist to meet demand of work/home routine, including lifting groceries, managing laundry, car parts, and/or managing suitcase when traveling.   NOT MET   Tolerate Home Activity Plan (HAP)/ Home Exercise Program (HEP) to promote safety and independence with ADL, with report of completion of at least 2 out of 4 days outside of program participation.  Progressing   Show improved perception of own abilities as evidenced by increase of FOTO scores to established MDC value and perception of performance ratings regarding personal long term  goals.  Progressing         Long Term goals: In 9 weeks, patient will: Status of goal, reviewed 3/7/2025:   Report implementation of application of mindfulness, stretching, and other coping strategies for improved participation in daily routine. Progressing   Verbalize functional application of lifting techniques in daily life (golfers lift, floor to waist, waist to shoulder). Progressing   Completes 5x sit to stand test in 9 seconds or less to improve ability to participate in community mobility.  Progressing   Applies functional application of lifting techniques (golfer's lift, floor to waist, waist to shoulder) in activities of daily life, per patient report.  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 Progressing   Have an improvement of 3 points in 2/5 personal goals in rating of  performance.  Progressing   Show improved perception of own abilities as evidenced by increase of FOTO scores to established MC II value and perception of performance ratings regarding personal long term goals.  Progressing      Patient Specific Goals; to be met after 2 month of (I) application of tools/techniques learned.  Vocational:  at work   Recreational: engaging in hobby, playing guitar, Orthodoxy activities   Daily Living Activities: driving, chores   Measured by patient's self-reported performance and satisfaction of personal FR goals, listed above in subjective section.   Total Score = sum of the activity performance scores / number of activities  Minimum detectable change (90%CI) for average score = 2 points  Minimum detectable change (90%CI) for single activity score  = 3 points    Plan     Continue per plan of care.     Updates/Grading for next session: progress as tolerated, rate activities related  to personal goals, f/u energy conservation and pacing.    DAYO Sheehan, JAMESR/L, CEAS-I  3/7/2025

## 2025-03-10 ENCOUNTER — CLINICAL SUPPORT (OUTPATIENT)
Dept: REHABILITATION | Facility: OTHER | Age: 35
End: 2025-03-10
Payer: COMMERCIAL

## 2025-03-10 ENCOUNTER — CLINICAL SUPPORT (OUTPATIENT)
Dept: PSYCHIATRY | Facility: OTHER | Age: 35
End: 2025-03-10
Payer: COMMERCIAL

## 2025-03-10 DIAGNOSIS — R68.89 DECREASED FUNCTIONAL ACTIVITY TOLERANCE: Primary | ICD-10-CM

## 2025-03-10 DIAGNOSIS — F41.0 GENERALIZED ANXIETY DISORDER WITH PANIC ATTACKS: Primary | ICD-10-CM

## 2025-03-10 DIAGNOSIS — G25.9 FUNCTIONAL MOVEMENT DISORDER: Primary | ICD-10-CM

## 2025-03-10 DIAGNOSIS — F41.1 GENERALIZED ANXIETY DISORDER WITH PANIC ATTACKS: Primary | ICD-10-CM

## 2025-03-10 PROCEDURE — 90853 GROUP PSYCHOTHERAPY: CPT | Mod: ,,, | Performed by: SOCIAL WORKER

## 2025-03-10 PROCEDURE — 97110 THERAPEUTIC EXERCISES: CPT

## 2025-03-10 PROCEDURE — 97112 NEUROMUSCULAR REEDUCATION: CPT

## 2025-03-10 PROCEDURE — 97530 THERAPEUTIC ACTIVITIES: CPT

## 2025-03-10 NOTE — PROGRESS NOTES
"[facility]    Functional Restoration Program  Physical Therapy Treatment Note  FRP Day 10    Name: Stephan Wallace  MRN:[id number]      Therapy Diagnosis:   Encounter Diagnosis   Name Primary?    Decreased functional activity tolerance Yes     Physician: Aleksandra Rodriguez NP    Date: 3/10/2025    Physician Orders: PT Eval and Treat   Medical Diagnosis from Referral:   F44.4 (ICD-10-CM) - Functional neurological symptom disorder with abnormal movement   R53.1,Z74.09,R68.89 (ICD-10-CM) - Decreased strength, endurance, and mobility   R53.83 (ICD-10-CM) - Fatigue, unspecified type      Evaluation Date: 1/10/2025  Authorization Period Expiration: 12/23/25  Plan of Care Expiration: 04/04/25  Visit # / Visits authorized: 08/10   FOTO: 02/ 03       Precautions: Standard     Time In: 2:30pm  Time Out: 3:30pm  Total Billable Time: 60 mins      SUBJECTIVE     Stephan reports improved symptoms with vulnerable "up front" conversation with Josiah in OT, felt more comfortable after that. She additionally had fewer sx when playing Goblet during OT and when doing NS soothing.      Patient report tolerating last session well /c no c/o of excess discomfort.  Patient agree to tx today.        Patient's FRP goals:  "go back to normalcy" dec random movements, improved speech pattern     From past sessions:   FRP seems to bring on more frustration.   Work is very stressful.  Self motivation is difficult      Current 5/10  Location(s): facial  and UE symptoms, effected speech    OBJECTIVE     Objective Measures updated at progress report unless specified.       Limitation/Restriction for FOTO ANKLE Survey     Therapist reviewed FOTO scores for Stephan Wallace on 1/10/2025.   FOTO documents entered into [employer] - see Media section.     INTAKE Score 1/10/2025: 63% (53/80 LEFS)   03/07/25: 65% (58/80 LEFS)      Predicted Score: 70%     MDC = 9 points             Treatment       Stephan received the Individualized " Treatments listed below:     Stephan participated in dynamic functional therapeutic activities to improve functional performance for 20 minutes, including:    Somatic Tracking:  Mindfulness-based, guided imagery to reduce fear-avoidance and catastrophizing around physical symptoms in body  Guided patient through attending to negative (painful), neutral, and positive sensations in the body  Used imagery and metaphors to reduce fear and hyperfixation on symptoms  Used tennis ball in hands and breathing as grounding sensations to reduce fear upon examining sx in body    Functional Game Activity  Used Ball Toss activity to improve reaching, stepping, reaction to external stimuli, and to train dynamic balance  Pt required to bend, reach,  objects from floor, and throw  Improved accuracy and functional mechanics over course of activity  Cues provided for postural awareness    NP:   Functional Activity Endurance   DATE ACTIVITY DURATION DETAILS OTHER/PRE   2/21/25 SciFit 12 min Level 1 3/10   02/26/25 SciFit 19 min Level 1 4/10   02/28/25 SciFit 10:30 min Level 1  3/10   02/28/25 Hallway ambulation 5 min  4/10   3/5/25 Sci Fit 20 min Level 1, no UE's 4/10              Stephan participated in neuromuscular re-education activities to improve: Balance, Coordination, Kinesthetic, Sense, Proprioception, and Posture for 40 minutes. The following activities were included:    Peripheral muscle strengthening which included 1-2 sets of 10-20 repetitions at a slow, controlled 5-7 second per rep pace focused on strengthening supporting musculature for improved body mechanics & functional mobility.  Patient & therapist focused on proper form & speed during treatment to ensure optimal strengthening of each targeted muscle group & neuromuscular re-education. Patients are instructed to keep sets/reps with proper load to stay at 3-5 out of 10 on the provided modified Panchito exertion scale.    BATCA Machine Exercises Weight (lbs) Sets  Repetitions Panchito Exertion Scale Rating   Leg Extension  5 1 10 5   Hamstring Curls 15 1 25 3.5   Chest Press 20 1 20 4   Pec Fly 20 1 15 5   Lat Pull Down 20 1 18 4.5   Mid Row 25 1 15 4   Bicep Bar Curls       Standing Tricep Extension       Single Leg Press  R/L 17.5 1 20 4   *tightness    Balance:  Narrow stance on Airex pad  2x20 tosses with beach ball  Challenged dynamic stability and provided mental distraction to reduce functional neurological sx    NP:  Fitter Board: 20 x2 taps             PRE 4/10   Lat - single HH, uneven taps, cues for decrease lean     Ant/post - B HH, cues for decrease for/back trunk movement, increased discomfort in achilles    Patient Education and Home Exercises     Home Exercises Provided and Patient Education Provided     Education provided:   - FRP Educational Topics: Physical & Psychological Pain Cycles, Diaphragmatic Breathing, Activity Pacing for Pain & Debility , Resisted Exercise Basics, Mindfulness Resources, and Posture & Body Mechanics      Written Home Exercises Provided: Patient instructed to cont prior HEP. Exercises were reviewed and Stephan was able to demonstrate them prior to the end of the session.  Stephan demonstrated good  understanding of the education provided. See EMR under Patient Instructions for information provided during therapy sessions    ASSESSMENT     Stephan tolerated treatment better than usual today. She skipped stretch routine with OT and focused on mentally stimulating activities, which reduced her symptoms significantly. Focused treatment on further activities to engage mind as well as body including Ball Toss and Airex ball toss, to good effect on patient's symptoms. Continues to tolerate resistance training well and to self-pace appropriately according to Panchito scale. Tolerated somatic tracking well with external object to ground, noting it felt better than some of the other mindfulness modalities she has tried so far. Generally benefits from  breathing-based mindfulness routines that reduce fear and improve her sense of safety.    Stephan Is progressing poor to fair towards her goals.   Pt prognosis is Good.     Pt will continue to benefit from skilled outpatient physical therapy to address the deficits listed in the problem list box on initial evaluation, provide pt/family education and to maximize pt's level of independence in the home and community environment.     Pt's spiritual, cultural and educational needs considered and pt agreeable to plan of care and goals.     Anticipated Barriers for therapy: chronic nature of issues, psychosocial factors, central sensitization    GOALS: Pt is in agreement with the following goals:  Goal Progress Towards Goal   SHORT Term: In 3 weeks, pt will:     Verbalize & demonstrate good understanding of resisted training with 3-1-3 second rep for [id number]-[id number] contraction to encourage full muscle recruitment for strength & endurance. Progressing 03/07/25   Verbalize & demonstrate good understanding of home stretch routine to improve AROM, help decrease pain & improve mobility. Progressing 03/07/25   Demonstrate proper supine or seated diaphragmatic breathing with decreased chest excursion & emphasis on full abdominal excursion & increased expiration time to promote muscle relaxation & increased parasympathetic activity to improve patient tolerance to activities. Progressing 03/07/25   Verbalize good understanding of importance of proper posture in resisted exercise, functional activities & ADL's in order to help reduce postural strain, promote proper breathing. Progressing 03/07/25   Demonstrate good understanding of full body resisted exercises w/ use of pictures &/or verbal cues to promote independence w/ exercise at the end of the program. Progressing 03/07/25   Verbalize plan for continuing resisted exercises w/ specific location (i.e. commercial gym, home, community fitness center)  to incorporate  "all major muscle groups to maintain & progress therapeutic functional improvements. Progressing 03/07/25   Verbalize difference between  "hurt vs harm"  to demonstrate understanding of fear avoidance in pain cycle.  Progressing 03/07/25         Midterm: In 8 weeks, pt will:     Verbalize good understanding of activities planning/scheduling (stretching, mindfulness, resisted training, & cardio) prescribed by PT/OT following the end of the program to sustain & progress functional improvements. Progressing 03/07/25   Perform selected resisted training w/ minimal to no cuing in therapy session to be independent w/ resisted strengthening. Progressing 03/07/25   Demonstrate improved symptom self-management w/ posture/positioning and mechanical movements and/or implementation of appropriate modalities throughout a typical day.  Progressing 03/07/25   Demonstrate independence w/ home stretch routine to improve AROM, help decrease pain & improve mobility. Progressing 03/07/25         Long Term: In 12 weeks, pt will:     Perform selected cardio & resisted training independently to continue safe regular performance of daily exercise. Progressing 03/07/25   Improve  6 MWT to 395 meters or greater to improve gait speed and mobility. Progressing 03/07/25   Perform LLE single leg stance 20 seconds or greater bilaterally to improve safety and balance in ADLs. Progressing 03/07/25   Demonstrate Timed Up & Go (with appropriate assistive device, if necessary) of 8 seconds or less to decrease fall risk and improve functional mobility. Progressing 03/07/25   Score 70% or more on ANKLE FOTO to indicate significant functional improvements in impaired functions secondary to pain. Progressing 03/07/25                PLAN     Continue PT per POC     Miguel Santoyo, PT    "

## 2025-03-10 NOTE — PROGRESS NOTES
Group Psychotherapy    Site: [facility]    Clinical status of patient: Outpatient    3/10/2025    Length of service:[id number]    Referred by: [facility]     Number of patients in attendance: 4    Target symptoms: Chronic Pain Management     Patient's response to intervention:  The patient's response to intervention is active listening, self-disclosure.    Progress toward goals and other mental status changes:  The patient's progress toward goals is good.    Plan: group psychotherapy    Topics Covered: Pt attended CBT for Chronic Pain as part of their participation in [facility]. Topics discussed today included Vulnerability and how it relates to chronic pain, depression, anxiety, and other mood disorders.

## 2025-03-11 NOTE — PROGRESS NOTES
Group Psychotherapy    Site: [facility]    Clinical status of patient: Outpatient    3/7/2025    Length of service:[id number]    Referred by: [facility]     Number of patients in attendance: 4    Target symptoms: Chronic Pain Management     Patient's response to intervention:  The patient's response to intervention is active listening.    Progress toward goals and other mental status changes:  The patient's progress toward goals is fair .    Plan: group psychotherapy    Topics Covered: Pt attended CBT for Chronic Pain as part of their participation in [facility]. Topics discussed today included a discussion on nutrition and added sugars as a contributor to inflammation. We discussed inflammation in the setting of anxiety, depression, other mood disorders and chronic pain. Went over homework, all questions answered. Will f/u Monday.

## 2025-03-12 ENCOUNTER — CLINICAL SUPPORT (OUTPATIENT)
Dept: REHABILITATION | Facility: OTHER | Age: 35
End: 2025-03-12
Payer: COMMERCIAL

## 2025-03-12 ENCOUNTER — CLINICAL SUPPORT (OUTPATIENT)
Dept: PSYCHIATRY | Facility: OTHER | Age: 35
End: 2025-03-12
Payer: COMMERCIAL

## 2025-03-12 DIAGNOSIS — F44.4 FUNCTIONAL NEUROLOGICAL SYMPTOM DISORDER WITH ABNORMAL MOVEMENT: ICD-10-CM

## 2025-03-12 DIAGNOSIS — R53.83 FATIGUE, UNSPECIFIED TYPE: ICD-10-CM

## 2025-03-12 DIAGNOSIS — F41.0 GENERALIZED ANXIETY DISORDER WITH PANIC ATTACKS: Primary | ICD-10-CM

## 2025-03-12 DIAGNOSIS — G25.9 FUNCTIONAL MOVEMENT DISORDER: Primary | ICD-10-CM

## 2025-03-12 DIAGNOSIS — R68.89 DECREASED FUNCTIONAL ACTIVITY TOLERANCE: Primary | ICD-10-CM

## 2025-03-12 DIAGNOSIS — F41.1 GENERALIZED ANXIETY DISORDER WITH PANIC ATTACKS: Primary | ICD-10-CM

## 2025-03-12 PROCEDURE — 97110 THERAPEUTIC EXERCISES: CPT | Mod: CQ

## 2025-03-12 PROCEDURE — 97530 THERAPEUTIC ACTIVITIES: CPT | Mod: CQ

## 2025-03-12 PROCEDURE — 97112 NEUROMUSCULAR REEDUCATION: CPT | Mod: CQ

## 2025-03-12 PROCEDURE — 97112 NEUROMUSCULAR REEDUCATION: CPT

## 2025-03-12 PROCEDURE — 90853 GROUP PSYCHOTHERAPY: CPT | Mod: ,,, | Performed by: SOCIAL WORKER

## 2025-03-12 PROCEDURE — 97110 THERAPEUTIC EXERCISES: CPT

## 2025-03-12 NOTE — PROGRESS NOTES
"[facility]    Functional [facility]oration Program  Physical Therapy Treatment Note  FRP Day 10    Name: Stephan Wallace  MRN:[id number]      Therapy Diagnosis:   Encounter Diagnosis   Name Primary?    Decreased functional activity tolerance Yes       Physician: Aleksandra Rodriguez NP    Date: 3/12/2025    Physician Orders: PT Eval and Treat   Medical Diagnosis from Referral:   F44.4 (ICD-10-CM) - Functional neurological symptom disorder with abnormal movement   R53.1,Z74.09,R68.89 (ICD-10-CM) - Decreased strength, endurance, and mobility   R53.83 (ICD-10-CM) - Fatigue, unspecified type      Evaluation Date: 1/10/2025  Authorization Period Expiration: 12/23/25  Plan of Care Expiration: 04/04/25  Visit # / Visits authorized: 011/20   FOTO: 02/ 03       Precautions: Standard     Time In: 2:30pm  Time Out: 3:30pm  Total Billable Time: 60 mins      SUBJECTIVE     Stephan reports feeling good about last sessions. Pt states she has resisted equipment at home and at one time used to work 2x/day. Pt states she walked on Sat and rode stationary bike on Sun.   Pt's remarks to notes of inspirational quotes, "that makes my body physically hurt".        Pt expressed she is not performing as well as she wanted in the FRP course.      Patient's FRP goals:  "go back to normalcy" dec random movements, improved speech pattern     From past sessions:   FRP seems to bring on more frustration.   Work is very stressful.  Self motivation is difficult      Current 5/10  Location(s): facial  and UE symptoms, effected speech    OBJECTIVE     Objective Measures updated at progress report unless specified.       Limitation/Restriction for FOTO ANKLE Survey     Therapist reviewed FOTO scores for Stephan Wallace on 1/10/2025.   FOTO documents entered into [employer] - see Media section.     INTAKE Score 1/10/2025: 63% (53/80 LEFS)   03/07/25: 65% (58/80 LEFS)      Predicted Score: 70%     MDC = 9 points             Treatment "       Stephan received the Individualized Treatments listed below:     Stephan participated in dynamic functional therapeutic activities to improve functional performance for 15 minutes, including:     Mindfulness: Body scan, Progressive Muscle Relaxation  Mindfulness-based activity involving deep breathing, mindful awareness of the body and headspace  Used to activate parasympathetic nervous system to decrease autonomic threat perception and thus reduce central sensitivity to pain  Patient demonstrate safe performance     Somatic Tracking:  Mindfulness-based, guided imagery to reduce fear-avoidance and catastrophizing around physical symptoms in body  Guided patient through attending to negative (painful), neutral, and positive sensations in the body  Used imagery and metaphors to reduce fear and hyperfixation on symptoms  Used tennis ball in hands and breathing as grounding sensations to reduce fear upon examining sx in body    Functional Game Activity  Used Ball Toss activity to improve reaching, stepping, reaction to external stimuli, and to train dynamic balance  Pt required to bend, reach,  objects from floor, and throw  Improved accuracy and functional mechanics over course of activity  Cues provided for postural awareness    NP:   Functional Activity Endurance   DATE ACTIVITY DURATION DETAILS OTHER/PRE   2/21/25 SciFit 12 min Level 1 3/10   02/26/25 SciFit 19 min Level 1 4/10   02/28/25 SciFit 10:30 min Level 1  3/10   02/28/25 Hallway ambulation 5 min  4/10   3/5/25 Sci Fit 20 min Level 1, no UE's 4/10   3/12/25 TM 12 min  5/10       Stephan participated in neuromuscular re-education activities to improve: Balance, Coordination, Kinesthetic, Sense, Proprioception, and Posture for 30 minutes. The following activities were included:    Peripheral muscle strengthening which included 1-2 sets of 10-20 repetitions at a slow, controlled 5-7 second per rep pace focused on strengthening supporting  musculature for improved body mechanics & functional mobility.  Patient & therapist focused on proper form & speed during treatment to ensure optimal strengthening of each targeted muscle group & neuromuscular re-education. Patients are instructed to keep sets/reps with proper load to stay at 3-5 out of 10 on the provided modified Panchito exertion scale.    [employer] Machine Exercises Weight (lbs) Sets Repetitions Panchito Exertion Scale Rating   Leg Extension  5 1 15 4   Hamstring Curls 17.5 1 30 3   Chest Press 22.5 1 20 4   Pec Fly       Lat Pull Down       Mid Row       Bicep Bar Curls 5 1 15 4   Standing Tricep Extension 7.5 1 20 3   Single Leg Press  R/L           Balance:NP  Narrow stance on Airex pad  2x20 tosses with beach ball  Challenged dynamic stability and provided mental distraction to reduce functional neurological sx    NP:  Fitter Board: 20 x2 taps             PRE 4/10   Lat - single HH, uneven taps, cues for decrease lean     Ant/post - B HH, cues for decrease for/back trunk movement, increased discomfort in achilles    Stephan received therapeutic exercises to develop strength, endurance, ROM, flexibility, posture, and core stabilization for 15 minutes including:    LTR x10  S/L open book stretch x10  DARLING x1 min  Prone press ups x10      Patient Education and Home Exercises     Home Exercises Provided and Patient Education Provided     Education provided:   - FRP Educational Topics: Physical & Psychological Pain Cycles, Diaphragmatic Breathing, Activity Pacing for Pain & Debility , Resisted Exercise Basics, Mindfulness Resources, and Posture & Body Mechanics      Written Home Exercises Provided: Patient instructed to cont prior HEP. Exercises were reviewed and Stephan was able to demonstrate them prior to the end of the session.  Stephan demonstrated good  understanding of the education provided. See EMR under Patient Instructions for information provided during therapy sessions    ASSESSMENT     Stephan  "participated well and is tolerating more activities in both PT and OT sessions. Pt given supine stretches to use instead of stretch routine for mind/body awareness and proprioception which we still want her to achieve. Pt had min difficulty with symptoms and able to perform. The physical activity of resistance ex decreases symptoms and pt is able to coordinate with the breathe and rate appropriately. Good response from pt when encouraged to "dust off" the resisted equipment they have at home. Pt agrees to being very judgmental toward herself and reacted poorly to positive affirmations asked to read to herself.     Stephan Is progressing poor to fair towards her goals.   Pt prognosis is Good.     Pt will continue to benefit from skilled outpatient physical therapy to address the deficits listed in the problem list box on initial evaluation, provide pt/family education and to maximize pt's level of independence in the home and community environment.     Pt's spiritual, cultural and educational needs considered and pt agreeable to plan of care and goals.     Anticipated Barriers for therapy: chronic nature of issues, psychosocial factors, central sensitization    GOALS: Pt is in agreement with the following goals:  Goal Progress Towards Goal   SHORT Term: In 3 weeks, pt will:     Verbalize & demonstrate good understanding of resisted training with 3-1-3 second rep for [id number] contraction to encourage full muscle recruitment for strength & endurance. Progressing 03/07/25   Verbalize & demonstrate good understanding of home stretch routine to improve AROM, help decrease pain & improve mobility. Progressing 03/07/25   Demonstrate proper supine or seated diaphragmatic breathing with decreased chest excursion & emphasis on full abdominal excursion & increased expiration time to promote muscle relaxation & increased parasympathetic activity to improve patient tolerance to activities. Progressing " "03/07/25   Verbalize good understanding of importance of proper posture in resisted exercise, functional activities & ADL's in order to help reduce postural strain, promote proper breathing. Progressing 03/07/25   Demonstrate good understanding of full body resisted exercises w/ use of pictures &/or verbal cues to promote independence w/ exercise at the end of the program. Progressing 03/07/25   Verbalize plan for continuing resisted exercises w/ specific location (i.e. commercial gym, home, community fitness center)  to incorporate all major muscle groups to maintain & progress therapeutic functional improvements. Progressing 03/07/25   Verbalize difference between  "hurt vs harm"  to demonstrate understanding of fear avoidance in pain cycle.  Progressing 03/07/25         Midterm: In 8 weeks, pt will:     Verbalize good understanding of activities planning/scheduling (stretching, mindfulness, resisted training, & cardio) prescribed by PT/OT following the end of the program to sustain & progress functional improvements. Progressing 03/07/25   Perform selected resisted training w/ minimal to no cuing in therapy session to be independent w/ resisted strengthening. Progressing 03/07/25   Demonstrate improved symptom self-management w/ posture/positioning and mechanical movements and/or implementation of appropriate modalities throughout a typical day.  Progressing 03/07/25   Demonstrate independence w/ home stretch routine to improve AROM, help decrease pain & improve mobility. Progressing 03/07/25         Long Term: In 12 weeks, pt will:     Perform selected cardio & resisted training independently to continue safe regular performance of daily exercise. Progressing 03/07/25   Improve  6 MWT to 395 meters or greater to improve gait speed and mobility. Progressing 03/07/25   Perform LLE single leg stance 20 seconds or greater bilaterally to improve safety and balance in ADLs. Progressing 03/07/25   Demonstrate Timed Up " & Go (with appropriate assistive device, if necessary) of 8 seconds or less to decrease fall risk and improve functional mobility. Progressing 03/07/25   Score 70% or more on ANKLE FOTO to indicate significant functional improvements in impaired functions secondary to pain. Progressing 03/07/25                PLAN     Continue PT per POC     Yelena Forrest, PTA

## 2025-03-12 NOTE — PROGRESS NOTES
Group Psychotherapy    Site: [facility]    Clinical status of patient: Outpatient    3/12/2025    Length of service:[id number]    Referred by: [facility]     Number of patients in attendance: 3    Target symptoms: Chronic Pain Management     Patient's response to intervention:  The patient's response to intervention is active listening.    Progress toward goals and other mental status changes:  The patient's progress toward goals is fair .    Plan: group psychotherapy    Topics Covered: Pt attended CBT for Chronic Pain as part of their participation in [facility]. Topics discussed today included Psychoeducational lecture about nutrition and the psychology of food as it relates to chronic pain, anxiety, depression, and other mood disorders.

## 2025-03-12 NOTE — PROGRESS NOTES
"[facility]  Functional Restoration Program  Occupational Therapy Treatment Note  [facility] Day 11    Name: Stephan Wallace  MRN:[id number]  Encounter Date: 3/12/2025    Diagnosis:   Encounter Diagnosis   Name Primary?    Functional movement disorder Yes     Referring provider: Aleksandra Rodriguez NP    Physician Orders: eval and treat; [facility]  Medical Diagnosis:   F44.4 (ICD-10-CM) - Functional neurological symptom disorder with abnormal movement   R53.1,Z74.09,R68.89 (ICD-10-CM) - Decreased strength, endurance, and mobility  R53.83 (ICD-10-CM) - Fatigue, unspecified type  Evaluation Date: 1/10/2025  Insurance Authorization Period Expiration: 12/31/2025  Plan of Care Certification Period: 4/21/2025  Visit # / Visits authorized: 10/10, plus eval; additional visits requested, pending approval.  FOTO completed: 1/3      Precautions:  Standard and Fall    Time In: 13:35  Time Out: 14:30  Total Billable Time: 55 minutes    SUBJECTIVE     She stated: "I was out of commission yesterday; I had a migraine. I think I am running on empty".    Stephan was compliant with parts pf home exercise program given previously.     Response to previous treatment: Ms. Wallace noted: fatigued.    Functional change: increased awareness, working on breathing and safety in me messaging.     Pain:     Currently rating pain as 3/10 at start of session.  Functional Pain Scale Rating 0-10: within the last 24 hours  Date 1/10/2025 2/17/2025 3/6/2025   Average 5/10 5/10 7/10   Worst 7/10 8/10 9/10   Best 1/10 3/10 3/10   Composite score 4.33/10 5.33/10 6.33/10   [JESUS  is 1 point or 15-20% change in interference composite score (Aleshiain et al. 2008)]     Location: head/face, mouth, B hand    Description: fatigue, exhaustion  Aggravating Factors: anxiety, depression, stress    Easing Factors: anxiety meds     Patient Specific Activities based on Personal goals:  Activity  2/17/2025    Performance (P) Satisfaction (S)   Driving 5 2   2.  Engaging " in Spiritism activities 5 4   3.  Engaging in activities outside of work 5 4   4.  Walking (grocery store, in the park/festival 7 3   5.  Chores 6 2           Total Score  5.6 5    Ratin-10; unable/unsatisfied - Able/Satisfied     Objective     Objective Measures updated at progress report unless specified.    COGNITIVE Exam  Behaviors: pleasant, cooperative. Movements/tics throughout session, in face, UE. Reports difficulties initiation.  Memory: brain fog. Difficulty multitasking. Not tested during eval.  Safety awareness/insight to disability: impaired judgment and impaired insight; pushes through at work, unable to do this outside work.   Coping skills/emotional control: listening to music. During session anxious, but participating.     Visual/Perceptual:  Tracking: attempted to test; difficult tracking due to movements affecting maintaining eye on target.  Saccades: not tested  Acuity: wears glasses  Convergence: impaired, with decreased ability to accommodate.     Dominant hand: right     Active Range of Motion  Upper Extremity 1/10/2025    RUE LUE   Hands WFL WFL   Wrist WFL WFL   Elbows WFL WFL   Shoulders WFL WFL      Upper Extremity Strength - Manual Muscle Testing  Motion Tested 1/10/2025    Right Left    Shoulder Flexion 4+/5 5/5   Shoulder Extension 5/5 5/5   Shoulder Abduction 5/5 5/5   Shoulder IR 5/5 5/5   Shoulder ER 4/5 4+/5   Elbow Flexion 5/5 5/5   Elbow Extension 5/5 5/5       Strength (in pounds, rung III, average of three trials)   2025 3/7/2025     Left 40.67 lbs 55.33 lbs   Right 36.33 lbs 62.33 lbs    [norms for women aged 35-39: R=74.1 +/-10.8; L=66.3 +/-11.7 (Ed et al, 1985)]     Functional Strength  Pile Testing: Lifting    1/10/2025 (lbs/HR/CHIOMA) 2025  (lbs/HR/CHIOMA) 3/7/2025   (lbs/HR/CHIOMA)    Repetitive Floor to Waist NT due to time /   Repetitive Waist to Shoulder  //4    1- Time Maximum   / NT   Carry-2 Handed (40ft)   NT NT    Work demand Level   TBD Light (#20 max, frequent lifting/carrying #10, 2.5 METS      Reason for stop point   Body mechanics, time needed for reps Body mechanics, time needed for reps    comment Increased frustration reported after instruction given. Educated in palming. -  Shoulder elevation noted.   The work demand level listed above is based on the physical performance screening test performed. More comprehensive physical testing integrated with clinical findings would be required to derived an accurate work capacity.      Balance  5 times sit-stand (adults 18-63 y/o) 1/10/2025 3/6/2025    >12 sec= fall risk for general elderly  >16 sec= fall risk for Parkinson's disease  >10 sec= balance/vestibular dysfunction (<59 y/o)  >14.2 sec= balance/vestibular dysfunction (>59 y/o)  >12 sec= fall risk for CVA 13.47 seconds     (without UE used to push up from chair) 17.9 seconds     (With UE on thighs)            Endurance Testing: Modified Ramp Treadmill Test    1/10/2025 3/7/2025    Minutes Completed  3 minutes 5 seconds  3 minutes   % Grades  10 %  10%   Speed (mph)  2.1 mph attempted  1.7 mph   METS 4  4    bpm  122 bpm   Panchito Rating Perceived Exertion Scale   5  4   Reason for stop point Fatigue, Decline in maintaining pace safely , Increased discomfort  Increased discomfort   Comments Reports achilles pain left  -          Limitation/Restriction for FOTO NOC Survey     Therapist reviewed FOTO scores for Stephan ESPARZA Encalade on 2/17/2025.  FOTO documents entered into [employer] - see Media section.     Limitation Score: 40%                 Treatment     Stephan received the treatments listed below:     Neuromuscular re-education activities to improve Coordination, Kinesthetic, Sense, Proprioception, and Posture for 43 minutes. The following activities were included:    In standing, underhand ball toss, eliciting laughter for PNS activation.    Stephan completed push and pull with grocery basket; education focused on  upright standing posture and activating gluts with functional mobility, leading with hips, decreasing  on handles, using push/pull method with hands for improved control of cart, keeping cart close to center of gravity and weight shifting/stretching as needed.    In supine, scapular pro/retractions (B)ly, and reverse pendulums. Hand over hand (A) provided to guide scapular mobility, as well as with D1/D2 extension/flexion patterns. Educated about doing these in supine, as with difficulty to maintain upright posture, affecting scapular mobility.     Therapeutic exercises to develop strength, endurance, ROM, flexibility, posture, and core stabilization for 12 minutes, including:    Stephan participated in endurance activity using Nustep for 11.53 minutes, starting at level 1 to 1.7 to improve functional endurance and progress towards increased MET level for improved community mobility and participation, rated as Moderate (3/10) exertion, Stephan re-educated on how to add mindfulness component to activity and how to pace appropriately by completed self check ins and grading activity as needed, with goal to reach moderate to sort of hard by end of activity. Completed 1065 steps; elevation 1006'.    No environmental, cultural, spiritual, developmental or education needs expressed or noted.    Patient Education and Home Exercises   Education provided:   - Progress towards goals   - importance of completion of exercises and activities outside of session to attain goals.   - proper posture and body mechanics.  - anticipated muscular soreness following lift testing and strategies for management including stretching, using ice, scheduled rest.  - Functional pain scale  - CHIOMA rate of perceived exertion scale.   - pain cycle  - pursed lip and diaphragmatic breathing techniques  - details of the Functional Restoration Program  - palming  - options for PNS activation: humming, singing out loud.   - hurt vs harm  - safety in  me messaging vs danger in me  - brain/body connected  - importance of trunk stability for UE functions.    Written Home Exercises Provided: Patient instructed to cont prior HEP.  Stephan demonstrated poor understanding of the education provided.     See EMR under Patient Instructions for exercises provided during therapy sessions. Patient education also located in FRP binder provided on day 1 of the cohort.     Assessment     Ms. Wallace open to engaging in activities, willing to engage in shoulder/scapular mobility in supine, and recommended to find short lasting task at home (tooth brushing) to complete in standing to practice upright position with shoulders back. Continues with episodes of increased non-functional movements during session; improved communication on status. Small ball given to take home for home use.     Stephan is progressing towards her goals and status of goals can be seen below. Stephan's prognosis is fair, pending ability to  receiving and understanding education and participating in therapeutic intervention.     Stephan would continue to benefit from skilled outpatient occupational therapy to address the deficits listed in the problem list on initial evaluation, provide patient education, and to maximize patient's level of independence in the home and community environment.     Anticipated barriers to occupational therapy: level of frustrations, h/o PTSD.    Goals:     SHORT Term goals: In 3 weeks, patient will:  Status of goal, reviewed 3/7/2025:   Implements correct use of breathing techniques during functional lifting tasks, with minimal cues needed. Progressing   Utilizes CHIOMA rate of exertion scale to pace performance with functional lifting tasks, and implements self-care strategies during activity participation to manage pain. NOT MET   Verbalize understanding of energy conservation and pacing strategies during daily habits, roles, and routines in order to improve tolerance for work  and home demands.  NOT MET   Completes 5x sit to stand test in 12 seconds or less to improve ability to participate in community mobility.   NOT MET   Demonstrate increased positional tolerance to the level needed for work, home, and community activities (standing in cue at social event, managing supplies for outing, streneous IADL), as evidenced by having a METS level of 5. NOT MET   Demonstrate proper body mechanics with functional lifting tasks (floor to waist, waist to shoulder, maximum lift, and box carry), via teach back method with minimal cues needed. NOT MET   Demonstrate proper sequencing  when lifting waist to shoulder for management of (I)ADL, including dressing and grooming, placing items in kitchen cabinets, folding towels, and/or managing suitcase when traveling.   Progressing   Demonstrate proper sequencing when lifting floor to waist to meet demand of work/home routine, including lifting groceries, managing laundry, car parts, and/or managing suitcase when traveling.   NOT MET   Tolerate Home Activity Plan (HAP)/ Home Exercise Program (HEP) to promote safety and independence with ADL, with report of completion of at least 2 out of 4 days outside of program participation.  Progressing   Show improved perception of own abilities as evidenced by increase of FOTO scores to established MDC value and perception of performance ratings regarding personal long term goals.  Progressing         Long Term goals: In 9 weeks, patient will: Status of goal, reviewed 3/7/2025:   Report implementation of application of mindfulness, stretching, and other coping strategies for improved participation in daily routine. Progressing   Verbalize functional application of lifting techniques in daily life (golfers lift, floor to waist, waist to shoulder). Progressing   Completes 5x sit to stand test in 9 seconds or less to improve ability to participate in community mobility.  Progressing   Applies functional application of  lifting techniques (golfer's lift, floor to waist, waist to shoulder) in activities of daily life, per patient report.  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 Progressing   Have an improvement of 3 points in 2/5 personal goals in rating of  performance.  Progressing   Show improved perception of own abilities as evidenced by increase of FOTO scores to established MC II value and perception of performance ratings regarding personal long term goals.  Progressing      Patient Specific Goals; to be met after 2 month of (I) application of tools/techniques learned.  Vocational:  at work   Recreational: engaging in hobby, playing guitar, Gnosticism activities   Daily Living Activities: driving, chores   Measured by patient's self-reported performance and satisfaction of personal FRP goals, listed above in subjective section.   Total Score = sum of the activity performance scores / number of activities  Minimum detectable change (90%CI) for average score = 2 points  Minimum detectable change (90%CI) for single activity score  = 3 points    Plan     Continue per plan of care.     Updates/Grading for next session: progress as tolerated, FOTO/rate activities related to personal goals, f/u energy conservation and pacing.    DAYO Sheehan, OTR/L, CEAS-I  3/12/2025

## 2025-03-13 NOTE — PROGRESS NOTES
I have met with the above student and agree with the assessment and note written. I was present for this encounter. Any changes have been made and authorized by me.  Zeynep Reyes, [facility] 03/13/2025 12:00 PM

## 2025-03-14 ENCOUNTER — CLINICAL SUPPORT (OUTPATIENT)
Dept: REHABILITATION | Facility: OTHER | Age: 35
End: 2025-03-14
Payer: COMMERCIAL

## 2025-03-14 ENCOUNTER — CLINICAL SUPPORT (OUTPATIENT)
Dept: PSYCHIATRY | Facility: OTHER | Age: 35
End: 2025-03-14
Payer: COMMERCIAL

## 2025-03-14 DIAGNOSIS — R68.89 DECREASED FUNCTIONAL ACTIVITY TOLERANCE: ICD-10-CM

## 2025-03-14 DIAGNOSIS — G25.9 FUNCTIONAL MOVEMENT DISORDER: Primary | ICD-10-CM

## 2025-03-14 DIAGNOSIS — F41.1 GENERALIZED ANXIETY DISORDER WITH PANIC ATTACKS: Primary | ICD-10-CM

## 2025-03-14 DIAGNOSIS — F41.0 GENERALIZED ANXIETY DISORDER WITH PANIC ATTACKS: Primary | ICD-10-CM

## 2025-03-14 DIAGNOSIS — R68.89 DECREASED FUNCTIONAL ACTIVITY TOLERANCE: Primary | ICD-10-CM

## 2025-03-14 DIAGNOSIS — F33.1 MODERATE EPISODE OF RECURRENT MAJOR DEPRESSIVE DISORDER: ICD-10-CM

## 2025-03-14 DIAGNOSIS — F34.1 DYSTHYMIC DISORDER: ICD-10-CM

## 2025-03-14 DIAGNOSIS — F44.4 FUNCTIONAL NEUROLOGICAL SYMPTOM DISORDER WITH ABNORMAL MOVEMENT: ICD-10-CM

## 2025-03-14 PROCEDURE — 97112 NEUROMUSCULAR REEDUCATION: CPT | Mod: CQ

## 2025-03-14 PROCEDURE — 97530 THERAPEUTIC ACTIVITIES: CPT

## 2025-03-14 PROCEDURE — 90853 GROUP PSYCHOTHERAPY: CPT | Mod: ,,, | Performed by: SOCIAL WORKER

## 2025-03-14 PROCEDURE — 97530 THERAPEUTIC ACTIVITIES: CPT | Mod: CQ

## 2025-03-14 PROCEDURE — 97110 THERAPEUTIC EXERCISES: CPT | Mod: CQ

## 2025-03-14 NOTE — PROGRESS NOTES
"[facility]  Functional Restoration Program  Occupational Therapy Treatment Note  FR Day 12    Name: Stephan Wallace  MRN:[id number]  Encounter Date: 3/14/2025    Diagnosis:   Encounter Diagnoses   Name Primary?    Functional movement disorder Yes    Decreased functional activity tolerance     Functional neurological symptom disorder with abnormal movement        Referring provider: Aleksandra Rodriguez NP    Physician Orders: eval and treat; [facility]  Medical Diagnosis:   F44.4 (ICD-10-CM) - Functional neurological symptom disorder with abnormal movement   R53.1,Z74.09,R68.89 (ICD-10-CM) - Decreased strength, endurance, and mobility  R53.83 (ICD-10-CM) - Fatigue, unspecified type  Evaluation Date: 1/10/2025  Insurance Authorization Period Expiration: 12/31/2025  Plan of Care Certification Period: 4/21/2025  Visit # / Visits authorized: 11/20 (plus eval)  FOTO completed: 1/3      Precautions:  Standard and Fall    Time In: 1430  Time Out: 1430  Total Billable Time: 60 minutes    SUBJECTIVE     She stated: "its frustrating for me, because I give so much to work, but when I need to stop, I really legitimately can do nothing more"  "I have a really hard time visualizing what you're asking me to do" during crafting     Stephan was compliant with parts pf home exercise program given previously. States that she has been enjoying the modifications to stretches provided by Malu SALGADO.     Response to previous treatment: Ms. Wallace noted: fatigued.    Functional change: increased awareness, working on breathing and safety in me messaging.     Pain:     Currently rating pain as 3/10 at start of session.  Functional Pain Scale Rating 0-10: within the last 24 hours  Date 1/10/2025 2/17/2025 3/6/2025   Average 5/10 5/10 7/10   Worst 7/10 8/10 9/10   Best 1/10 3/10 3/10   Composite score 4.33/10 5.33/10 6.33/10   [JESUS  is 1 point or 15-20% change in interference composite score (Cleo et al. 2008)]     Location: " head/face, mouth, B hand    Description: fatigue, exhaustion  Aggravating Factors: anxiety, depression, stress    Easing Factors: anxiety meds     Patient Specific Activities based on Personal goals:  Activity  2025 3/14/2025     Performance (P) Satisfaction (S) P S   Driving 5 2 8 10   2.  Engaging in [place] activities 5 4 5 7   3.  Engaging in activities outside of work 5 4 6.5 4   4.  Walking (grocery store, in the park/festival 7 3 7 8   5.  Chores 6 2 4 4             Total Score  5.6 5  6.1 6.6   Ratin-10; unable/unsatisfied - Able/Satisfied     Objective     Objective Measures updated at progress report unless specified.    COGNITIVE Exam  Behaviors: pleasant, cooperative. Movements/tics throughout session, in face, UE. Reports difficulties initiation.  Memory: brain fog. Difficulty multitasking. Not tested during eval.  Safety awareness/insight to disability: impaired judgment and impaired insight; pushes through at work, unable to do this outside work.   Coping skills/emotional control: listening to music. During session anxious, but participating.     Visual/Perceptual:  Tracking: attempted to test; difficult tracking due to movements affecting maintaining eye on target.  Saccades: not tested  Acuity: wears glasses  Convergence: impaired, with decreased ability to accommodate.     Dominant hand: right     Active Range of Motion  Upper Extremity 1/10/2025    RUE LUE   Hands WFL WFL   Wrist WFL WFL   Elbows WFL WFL   Shoulders WFL WFL      Upper Extremity Strength - Manual Muscle Testing  Motion Tested 1/10/2025    Right Left    Shoulder Flexion 4+/5 5/5   Shoulder Extension 5/5 5/5   Shoulder Abduction 5/5 5/5   Shoulder IR 5/5 5/5   Shoulder ER 4/5 4+/5   Elbow Flexion 5/5 5/5   Elbow Extension 5/5 5/5       Strength (in pounds, rung III, average of three trials)   2025 3/7/2025     Left 40.67 lbs 55.33 lbs   Right 36.33 lbs 62.33 lbs    [norms for women aged 35-39: R=74.1 +/-10.8; L=66.3  +/-11.7 (Ed et al, 1985)]     Functional Strength  Pile Testing: Lifting    1/10/2025 (lbs/HR/PANCHITO) 2/26/2025  (lbs/HR/PANCHITO) 3/7/2025   (lbs/HR/PANCHITO)    Repetitive Floor to Waist NT due to time 8/nt/5 8/109/5   Repetitive Waist to Shoulder  18/101/4 13/107/4    1- Time Maximum   24/97/5 NT   Carry-2 Handed (40ft)   NT NT   Work demand Level   TBD Light (#20 max, frequent lifting/carrying #10, 2.5 METS      Reason for stop point   Body mechanics, time needed for reps Body mechanics, time needed for reps    comment Increased frustration reported after instruction given. Educated in [facility]. -  Shoulder elevation noted.   The work demand level listed above is based on the physical performance screening test performed. More comprehensive physical testing integrated with clinical findings would be required to derived an accurate work capacity.      Balance  5 times sit-stand (adults 18-63 y/o) 1/10/2025 3/6/2025    >12 sec= fall risk for general elderly  >16 sec= fall risk for Parkinson's disease  >10 sec= balance/vestibular dysfunction (<61 y/o)  >14.2 sec= balance/vestibular dysfunction (>61 y/o)  >12 sec= fall risk for CVA 13.47 seconds     (without UE used to push up from chair) 17.9 seconds     (With UE on thighs)            Endurance Testing: Modified Ramp Treadmill Test    1/10/2025 3/7/2025    Minutes Completed  3 minutes 5 seconds  3 minutes   % Grades  10 %  10%   Speed (mph)  2.1 mph attempted  1.7 mph   METS 4  4    bpm  122 bpm   Panchito Rating Perceived Exertion Scale   5  4   Reason for stop point Fatigue, Decline in maintaining pace safely , Increased discomfort  Increased discomfort   Comments Reports achilles pain left  -          Limitation/Restriction for FOTO NOC Survey     Therapist reviewed FOTO scores for Chazmin D Encalade on 2/17/2025.  FOTO documents entered into EPIC - see Media section.     Limitation Score: 40%                 Treatment     Nancyalberto received the treatments listed  "below:     Neuromuscular re-education activities to improve Coordination, Kinesthetic, Sense, Proprioception, and Posture for 43 minutes. The following activities were included:    Standing with foot on step 2* knee pain, completed standing crafting task. Rated as sort of hard 4/10.     Stephan participated in endurance activity using Nustep for 6 minutes, starting at level 1.0 to improve functional endurance and progress towards increased MET level for improved community mobility and participation, rated as Moderate (3/10) exertion, Stephan re-educated on how to add mindfulness component to activity and how to pace appropriately by completed self check ins and grading activity as needed, with goal to reach moderate to sort of hard by end of activity.      Seated on physioball. Completed scapular retration/protraction with application to functional cup stacking task. Rated as easy 2/10, but also commented on "how tight I always am"     Standing with towel on mat, scapular mobility with wiping motion, education on use of lower scap stabilizers, with physical and tactile cues to identify, to assist with movement 2* tendency to over use upper traps. Moderate cues to pair movement with breath.      Standing with towel on window and markers, writing out education and tools on pacing, and PNS vs SNS activation performed. Rated education as applicable but hard 5/10. And movement as 3/10.     No environmental, cultural, spiritual, developmental or education needs expressed or noted.    Patient Education and Home Exercises   Education provided:   - Progress towards goals   - importance of completion of exercises and activities outside of session to attain goals.   - proper posture and body mechanics.  - anticipated muscular soreness following lift testing and strategies for management including stretching, using ice, scheduled rest.  - Functional pain scale  - CHIOMA rate of perceived exertion scale.   - pain cycle  - pursed " lip and diaphragmatic breathing techniques  - details of the Functional Restoration Program  - palmi  - options for PNS activation: humming, singing out loud.   - hurt vs harm  - safety in me messaging vs danger in me  - brain/body connected  - importance of trunk stability for UE functions.    Written Home Exercises Provided: Patient instructed to cont prior HEP.  Stephan demonstrated poor understanding of the education provided.     See EMR under Patient Instructions for exercises provided during therapy sessions. Patient education also located in FRP binder provided on day 1 of the cohort.     Assessment     Ms. Wallace open to engaging in activities, willing to engage in shoulder/scapular mobility in seated. Endorsed difficulty with following directions during crafting exercise, but was noted to be able to complete despite anticipation of failure and presence of challenge. Good engagement with education and verbalized application outside of clinic with modifications provided.     Stephan is progressing towards her goals and status of goals can be seen below. Stephan's prognosis is fair, pending ability to  receiving and understanding education and participating in therapeutic intervention.     Stephan would continue to benefit from skilled outpatient occupational therapy to address the deficits listed in the problem list on initial evaluation, provide patient education, and to maximize patient's level of independence in the home and community environment.     Anticipated barriers to occupational therapy: level of frustrations, h/o PTSD.    Goals:     SHORT Term goals: In 3 weeks, patient will:  Status of goal, reviewed 3/7/2025:   Implements correct use of breathing techniques during functional lifting tasks, with minimal cues needed. Progressing   Utilizes CHIOMA rate of exertion scale to pace performance with functional lifting tasks, and implements self-care strategies during activity participation to manage  pain. NOT MET   Verbalize understanding of energy conservation and pacing strategies during daily habits, roles, and routines in order to improve tolerance for work and home demands.  NOT MET   Completes 5x sit to stand test in 12 seconds or less to improve ability to participate in community mobility.   NOT MET   Demonstrate increased positional tolerance to the level needed for work, home, and community activities (standing in cue at social event, managing supplies for outing, streneous IADL), as evidenced by having a METS level of 5. NOT MET   Demonstrate proper body mechanics with functional lifting tasks (floor to waist, waist to shoulder, maximum lift, and box carry), via teach back method with minimal cues needed. NOT MET   Demonstrate proper sequencing  when lifting waist to shoulder for management of (I)ADL, including dressing and grooming, placing items in kitchen cabinets, folding towels, and/or managing suitcase when traveling.   Progressing   Demonstrate proper sequencing when lifting floor to waist to meet demand of work/home routine, including lifting groceries, managing laundry, car parts, and/or managing suitcase when traveling.   NOT MET   Tolerate Home Activity Plan (HAP)/ Home Exercise Program (HEP) to promote safety and independence with ADL, with report of completion of at least 2 out of 4 days outside of program participation.  Progressing   Show improved perception of own abilities as evidenced by increase of FOTO scores to established MDC value and perception of performance ratings regarding personal long term goals.  Progressing         Long Term goals: In 9 weeks, patient will: Status of goal, reviewed 3/7/2025:   Report implementation of application of mindfulness, stretching, and other coping strategies for improved participation in daily routine. Progressing   Verbalize functional application of lifting techniques in daily life (golfers lift, floor to waist, waist to shoulder).  Progressing   Completes 5x sit to stand test in 9 seconds or less to improve ability to participate in community mobility.  Progressing   Applies functional application of lifting techniques (golfer's lift, floor to waist, waist to shoulder) in activities of daily life, per patient report.  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 Progressing   Have an improvement of 3 points in 2/5 personal goals in rating of  performance.  Progressing   Show improved perception of own abilities as evidenced by increase of FOTO scores to established MC II value and perception of performance ratings regarding personal long term goals.  Progressing      Patient Specific Goals; to be met after 2 month of (I) application of tools/techniques learned.  Vocational:  at work   Recreational: engaging in hobby, playing guitar, Zoroastrian activities   Daily Living Activities: driving, chores   Measured by patient's self-reported performance and satisfaction of personal FRP goals, listed above in subjective section.   Total Score = sum of the activity performance scores / number of activities  Minimum detectable change (90%CI) for average score = 2 points  Minimum detectable change (90%CI) for single activity score  = 3 points    Plan     Continue per plan of care.     Updates/Grading for next session: progress as tolerated, FOTO/rate activities related to personal goals, f/u energy conservation and pacing.     MAYRA Mccain   3/14/2025

## 2025-03-14 NOTE — PROGRESS NOTES
"[facility]    Functional Restoration Program  Physical Therapy Treatment Note  FRP Day 10    Name: Stephan Wallace  MRN:[id number]      Therapy Diagnosis:   Encounter Diagnosis   Name Primary?    Decreased functional activity tolerance Yes       Physician: Aleksandra Rodriguez NP    Date: 3/14/2025    Physician Orders: PT Eval and Treat   Medical Diagnosis from Referral:   F44.4 (ICD-10-CM) - Functional neurological symptom disorder with abnormal movement   R53.1,Z74.09,R68.89 (ICD-10-CM) - Decreased strength, endurance, and mobility   R53.83 (ICD-10-CM) - Fatigue, unspecified type      Evaluation Date: 1/10/2025  Authorization Period Expiration: 12/23/25  Plan of Care Expiration: 04/04/25  Visit # / Visits authorized: 12/20   FOTO: 02/ 03       Precautions: Standard     Time In: 1:30pm  Time Out: 2:30pm  Total Billable Time: 60 mins      SUBJECTIVE     Stephan states if given the choice, she prefer to not perform the stretch routine in a group setting. Pt states it increases her anxiety. Pt reports she performed lumbar stretches given last visit in AM for LB pain management. Pt also, states she has been walking on weekend days, her weekdays are time constraining due to work/drive. Pt c/o L knee pain. 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 Pain has increased over the last few days.  Pt expressed she is not performing as well as she wanted in the FRP course.      Patient's FRP goals:  "go back to normalcy" dec random movements, improved speech pattern     From past sessions:   FRP seems to bring on more frustration.   Work is very stressful.  Self motivation is difficult      Current 4/10  Location(s): facial  and UE symptoms, effected speech    OBJECTIVE     Objective Measures updated at progress report unless specified.       Limitation/Restriction for FOTO ANKLE Survey     Therapist reviewed FOTO scores for Stephan Wallace on 1/10/2025.   FOTO documents entered into EPIC - see Media " section.     INTAKE Score 1/10/2025: 63% (53/80 LEFS)   03/07/25: 65% (58/80 LEFS)      Predicted Score: 70%     MDC = 9 points             Treatment       Stephan received the Individualized Treatments listed below:     Stephan participated in dynamic functional therapeutic activities to improve functional performance for 15 minutes, including:     Mindfulness: Body scan, Progressive Muscle Relaxation  Mindfulness-based activity involving deep breathing, mindful awareness of the body and headspace  Used to activate parasympathetic nervous system to decrease autonomic threat perception and thus reduce central sensitivity to pain  Patient demonstrate safe performance     Somatic Tracking:  Mindfulness-based, guided imagery to reduce fear-avoidance and catastrophizing around physical symptoms in body  Guided patient through attending to negative (painful), neutral, and positive sensations in the body  Used imagery and metaphors to reduce fear and hyperfixation on symptoms  Used tennis ball in hands and breathing as grounding sensations to reduce fear upon examining sx in body    Functional Game Activity  Used Ball Toss activity to improve reaching, stepping, reaction to external stimuli, and to train dynamic balance  Pt required to bend, reach,  objects from floor, and throw  Improved accuracy and functional mechanics over course of activity  Cues provided for postural awareness    NP:   Functional Activity Endurance   DATE ACTIVITY DURATION DETAILS OTHER/PRE   2/21/25 SciFit 12 min Level 1 3/10   02/26/25 SciFit 19 min Level 1 4/10   02/28/25 SciFit 10:30 min Level 1  3/10   02/28/25 Hallway ambulation 5 min  4/10   3/5/25 Sci Fit 20 min Level 1, no UE's 4/10   3/12/25 TM 12 min  5/10   3/14/25 Stationary bike 12 min Level 2 5/10 (knee pain)              Stephan participated in neuromuscular re-education activities to improve: Balance, Coordination, Kinesthetic, Sense, Proprioception, and Posture for 30  minutes. The following activities were included:    Peripheral muscle strengthening which included 1-2 sets of 10-20 repetitions at a slow, controlled 5-7 second per rep pace focused on strengthening supporting musculature for improved body mechanics & functional mobility.  Patient & therapist focused on proper form & speed during treatment to ensure optimal strengthening of each targeted muscle group & neuromuscular re-education. Patients are instructed to keep sets/reps with proper load to stay at 3-5 out of 10 on the provided modified Panchito exertion scale.    BATCA Machine Exercises Weight (lbs) Sets Repetitions Panchito Exertion Scale Rating   Leg Extension        Hamstring Curls       Chest Press 22.5 1 20 4   Pec Fly 20 1 15 4   Lat Pull Down       Mid Row       Bicep Bar Curls       Standing Tricep Extension       Single Leg Press  R/L           Balance:NP  Narrow stance on Airex pad  2x20 tosses with beach ball  Challenged dynamic stability and provided mental distraction to reduce functional neurological sx    NP:  Fitter Board: 20 x2 taps             PRE 4/10   Lat - single HH, uneven taps, cues for decrease lean     Ant/post - B HH, cues for decrease for/back trunk movement, increased discomfort in achilles    Nancyzmin received therapeutic exercises to develop strength, endurance, ROM, flexibility, posture, and core stabilization for 15 minutes including:    Prone knee stretch c/ strap 30 sec x3  DARLING x1 min  Prone press ups x10   Supine SAQ L over bolster x15  (PRE 3/10)      Patient Education and Home Exercises     Home Exercises Provided and Patient Education Provided     Education provided:   - FRP Educational Topics: Physical & Psychological Pain Cycles, Diaphragmatic Breathing, Activity Pacing for Pain & Debility , Resisted Exercise Basics, Mindfulness Resources, and Posture & Body Mechanics      Written Home Exercises Provided: Patient instructed to cont prior HEP. Exercises were reviewed and Stephan was  able to demonstrate them prior to the end of the session.  Stephan demonstrated good  understanding of the education provided. See EMR under Patient Instructions for information provided during therapy sessions    ASSESSMENT     Stephan participated well and is tolerating more activities in both PT and OT sessions. Pt performance of some of the FRP tenets is demonstrating improved understanding of it's principles. Cont to educate on psychosocial relationship between stress and pain/symptoms and calming the NS and decreasing self judgement is how to manage. Stationary bike ridden while listening and performing body scan, to assist with knee pain and joint mobility but also to calm the NS. Pt performed resisted ex with BATCA machine with good form and pace, and returned to mat for lumbar stretches and knee ROM ex.    Stephan Is progressing poor to fair towards her goals.   Pt prognosis is Good.     Pt will continue to benefit from skilled outpatient physical therapy to address the deficits listed in the problem list box on initial evaluation, provide pt/family education and to maximize pt's level of independence in the home and community environment.     Pt's spiritual, cultural and educational needs considered and pt agreeable to plan of care and goals.     Anticipated Barriers for therapy: chronic nature of issues, psychosocial factors, central sensitization    GOALS: Pt is in agreement with the following goals:  Goal Progress Towards Goal   SHORT Term: In 3 weeks, pt will:     Verbalize & demonstrate good understanding of resisted training with 3-1-3 second rep for pryheilsly-zuytpwvca-cdgywzpss contraction to encourage full muscle recruitment for strength & endurance. Progressing 03/07/25   Verbalize & demonstrate good understanding of home stretch routine to improve AROM, help decrease pain & improve mobility. Progressing 03/07/25   Demonstrate proper supine or seated diaphragmatic breathing with decreased chest  "excursion & emphasis on full abdominal excursion & increased expiration time to promote muscle relaxation & increased parasympathetic activity to improve patient tolerance to activities. Progressing 03/07/25   Verbalize good understanding of importance of proper posture in resisted exercise, functional activities & ADL's in order to help reduce postural strain, promote proper breathing. Progressing 03/07/25   Demonstrate good understanding of full body resisted exercises w/ use of pictures &/or verbal cues to promote independence w/ exercise at the end of the program. Progressing 03/07/25   Verbalize plan for continuing resisted exercises w/ specific location (i.e. commercial gym, home, community fitness center)  to incorporate all major muscle groups to maintain & progress therapeutic functional improvements. Progressing 03/07/25   Verbalize difference between  "hurt vs harm"  to demonstrate understanding of fear avoidance in pain cycle.  Progressing 03/07/25         Midterm: In 8 weeks, pt will:     Verbalize good understanding of activities planning/scheduling (stretching, mindfulness, resisted training, & cardio) prescribed by PT/OT following the end of the program to sustain & progress functional improvements. Progressing 03/07/25   Perform selected resisted training w/ minimal to no cuing in therapy session to be independent w/ resisted strengthening. Progressing 03/07/25   Demonstrate improved symptom self-management w/ posture/positioning and mechanical movements and/or implementation of appropriate modalities throughout a typical day.  Progressing 03/07/25   Demonstrate independence w/ home stretch routine to improve AROM, help decrease pain & improve mobility. Progressing 03/07/25         Long Term: In 12 weeks, pt will:     Perform selected cardio & resisted training independently to continue safe regular performance of daily exercise. Progressing 03/07/25   Improve  6 MWT to 395 meters or greater to " improve gait speed and mobility. Progressing 03/07/25   Perform LLE single leg stance 20 seconds or greater bilaterally to improve safety and balance in ADLs. Progressing 03/07/25   Demonstrate Timed Up & Go (with appropriate assistive device, if necessary) of 8 seconds or less to decrease fall risk and improve functional mobility. Progressing 03/07/25   Score 70% or more on ANKLE FOTO to indicate significant functional improvements in impaired functions secondary to pain. Progressing 03/07/25                PLAN     Continue PT per POC     Yelena Forrest, PTA

## 2025-03-14 NOTE — PROGRESS NOTES
Group Psychotherapy    Site: [facility]    Clinical status of patient: Outpatient    3/14/2025    Length of service:[id number]    Referred by: [facility]     Number of patients in attendance: 3    Target symptoms: Chronic Pain Management     Patient's response to intervention:  The patient's response to intervention is active listening, self-disclosure.    Progress toward goals and other mental status changes:  The patient's progress toward goals is good.    Plan: group psychotherapy    Topics Covered: Pt attended CBT for Chronic Pain as part of their participation in [facility]. Topics discussed today included Setting Healthy Boundaries and how it relates to chronic pain, depression, anxiety, and other mood disorders.

## 2025-03-17 ENCOUNTER — CLINICAL SUPPORT (OUTPATIENT)
Dept: REHABILITATION | Facility: OTHER | Age: 35
End: 2025-03-17
Payer: COMMERCIAL

## 2025-03-17 ENCOUNTER — CLINICAL SUPPORT (OUTPATIENT)
Dept: PSYCHIATRY | Facility: OTHER | Age: 35
End: 2025-03-17
Payer: COMMERCIAL

## 2025-03-17 DIAGNOSIS — G25.9 FUNCTIONAL MOVEMENT DISORDER: Primary | ICD-10-CM

## 2025-03-17 DIAGNOSIS — F41.1 GENERALIZED ANXIETY DISORDER WITH PANIC ATTACKS: Primary | ICD-10-CM

## 2025-03-17 DIAGNOSIS — R68.89 DECREASED FUNCTIONAL ACTIVITY TOLERANCE: Primary | ICD-10-CM

## 2025-03-17 DIAGNOSIS — F41.0 GENERALIZED ANXIETY DISORDER WITH PANIC ATTACKS: Primary | ICD-10-CM

## 2025-03-17 DIAGNOSIS — F34.1 DYSTHYMIC DISORDER: ICD-10-CM

## 2025-03-17 PROCEDURE — 97110 THERAPEUTIC EXERCISES: CPT | Mod: CQ

## 2025-03-17 PROCEDURE — 90853 GROUP PSYCHOTHERAPY: CPT | Mod: ,,, | Performed by: SOCIAL WORKER

## 2025-03-17 PROCEDURE — 97530 THERAPEUTIC ACTIVITIES: CPT | Mod: CQ

## 2025-03-17 PROCEDURE — 97112 NEUROMUSCULAR REEDUCATION: CPT

## 2025-03-17 PROCEDURE — 97110 THERAPEUTIC EXERCISES: CPT

## 2025-03-17 PROCEDURE — 97112 NEUROMUSCULAR REEDUCATION: CPT | Mod: CQ

## 2025-03-17 NOTE — PROGRESS NOTES
"[facility]    [facility]  Physical Therapy Treatment Note  FRP Day 14    Name: Stephan Wallace  MRN:[id number]      Therapy Diagnosis:   Encounter Diagnosis   Name Primary?    Decreased functional activity tolerance Yes         Physician: Aleksandra Rodriguez NP    Date: 3/17/2025    Physician Orders: PT Eval and Treat   Medical Diagnosis from Referral:   F44.4 (ICD-10-CM) - Functional neurological symptom disorder with abnormal movement   R53.1,Z74.09,R68.89 (ICD-10-CM) - Decreased strength, endurance, and mobility   R53.83 (ICD-10-CM) - Fatigue, unspecified type      Evaluation Date: 1/10/2025  Authorization Period Expiration: 12/23/25  Plan of Care Expiration: 04/04/25  Visit # / Visits authorized: 13/15   FOTO: 02/ 03       Precautions: Standard     Time In: 2:30 pm  Time Out: 3:30 pm  Total Billable Time: 60 mins      SUBJECTIVE     Stephan reports less discomfort this afternoon than she has had since starting the program. Pt states she has been performing the lumbar stretches, breathing activity and walking at home on weekends. When she gets home from work she is exhausted. Pt states she had some "me time" over the weekend. Pt states she still has not looked at the resisted equipment they have at home. Pt c/o R knee pain which was worse yesterday but improved tooday. Yesterday it almost gave out.      Patient's FRP goals:  "go back to normalcy" dec random movements, improved speech pattern     From past sessions:   FRP seems to bring on more frustration.   Work is very stressful.  Self motivation is difficult      Current 2/10  Location(s): facial  and UE symptoms, effected speech    OBJECTIVE     Objective Measures updated at progress report unless specified.       Limitation/Restriction for FOTO ANKLE Survey     Therapist reviewed FOTO scores for Stephan Wallace on 1/10/2025.   FOTO documents entered into [employer] - see Media section.     INTAKE Score 1/10/2025: " 63% (53/80 LEFS)   03/07/25: 65% (58/80 LEFS)      Predicted Score: 70%     MDC = 9 points             Treatment       Stephan received the Individualized Treatments listed below:     Stephan participated in dynamic functional therapeutic activities to improve functional performance for 15 minutes, including:     Mindfulness: Body scan, Progressive Muscle Relaxation  Mindfulness-based activity involving deep breathing, mindful awareness of the body and headspace  Used to activate parasympathetic nervous system to decrease autonomic threat perception and thus reduce central sensitivity to pain  Patient demonstrate safe performance     Somatic Tracking:  Mindfulness-based, guided imagery to reduce fear-avoidance and catastrophizing around physical symptoms in body  Guided patient through attending to negative (painful), neutral, and positive sensations in the body  Used imagery and metaphors to reduce fear and hyperfixation on symptoms  Used tennis ball in hands and breathing as grounding sensations to reduce fear upon examining sx in body    Functional Game Activity  Used Ball Toss activity to improve reaching, stepping, reaction to external stimuli, and to train dynamic balance  Pt required to bend, reach,  objects from floor, and throw  Improved accuracy and functional mechanics over course of activity  Cues provided for postural awareness    NP:   Functional Activity Endurance   DATE ACTIVITY DURATION DETAILS OTHER/PRE   2/21/25 SciFit 12 min Level 1 3/10   02/26/25 SciFit 19 min Level 1 4/10   02/28/25 SciFit 10:30 min Level 1  3/10   02/28/25 Hallway ambulation 5 min  4/10   3/5/25 Sci Fit 20 min Level 1, no UE's 4/10   3/12/25 TM 12 min  5/10   3/14/25 Stationary bike 12 min Level 2 5/10 (knee pain)              Stephan participated in neuromuscular re-education activities to improve: Balance, Coordination, Kinesthetic, Sense, Proprioception, and Posture for 30 minutes. The following activities were  included:    Peripheral muscle strengthening which included 1-2 sets of 10-20 repetitions at a slow, controlled 5-7 second per rep pace focused on strengthening supporting musculature for improved body mechanics & functional mobility.  Patient & therapist focused on proper form & speed during treatment to ensure optimal strengthening of each targeted muscle group & neuromuscular re-education. Patients are instructed to keep sets/reps with proper load to stay at 3-5 out of 10 on the provided modified Panchito exertion scale.    BATCA Machine Exercises Weight (lbs) Sets Repetitions Panchito Exertion Scale Rating   Leg Extension        Hamstring Curls       Chest Press 22.5 1 20 4   Pec Fly 20 1 15 4   Lat Pull Down       Mid Row       Bicep Bar Curls       Standing Tricep Extension       Single Leg Press  R/L 30 1 20        Balance:NP  Narrow stance on Airex pad  2x20 tosses with beach ball  Challenged dynamic stability and provided mental distraction to reduce functional neurological sx    NP:  Fitter Board: 20 x2 taps             PRE 4/10   Lat - single HH, uneven taps, cues for decrease lean     Ant/post - B HH, cues for decrease for/back trunk movement, increased discomfort in achilles    Stephan received therapeutic exercises to develop strength, endurance, ROM, flexibility, posture, and core stabilization for 15 minutes including:    Prone knee stretch c/ strap 30 sec x3  DARLING x1 min  Prone press ups x10   Supine SAQ L over bolster x15  (PRE 3/10)      Patient Education and Home Exercises     Home Exercises Provided and Patient Education Provided     Education provided:   - FRP Educational Topics: Physical & Psychological Pain Cycles, Diaphragmatic Breathing, Activity Pacing for Pain & Debility , Resisted Exercise Basics, Mindfulness Resources, and Posture & Body Mechanics      Written Home Exercises Provided: Patient instructed to cont prior HEP. Exercises were reviewed and Stephan was able to demonstrate them prior to  the end of the session.  Stephan demonstrated good  understanding of the education provided. See EMR under Patient Instructions for information provided during therapy sessions    ASSESSMENT     Stephan participated well and is tolerating more activities in both PT and OT sessions. Pt performance of some of the FRP tenets is demonstrating improved understanding of it's principles and acknowledging small wins to get to the bigger picture. Cont to educate on psychosocial relationship between stress and pain/symptoms and calming the NS and decreasing self judgement is how to manage. Pt performed resisted ex with BATCA machine with good form and pace, and returned to mat for lumbar stretches and knee ROM ex. Education and conversation on impact of psychosocial stressors on pain experience. pt open to feedback endorsed difficulty with awareness of impact without cues.         Stephan Is progressing poor to fair towards her goals.   Pt prognosis is Good.     Pt will continue to benefit from skilled outpatient physical therapy to address the deficits listed in the problem list box on initial evaluation, provide pt/family education and to maximize pt's level of independence in the home and community environment.     Pt's spiritual, cultural and educational needs considered and pt agreeable to plan of care and goals.     Anticipated Barriers for therapy: chronic nature of issues, psychosocial factors, central sensitization    GOALS: Pt is in agreement with the following goals:  Goal Progress Towards Goal   SHORT Term: In 3 weeks, pt will:     Verbalize & demonstrate good understanding of resisted training with 3-1-3 second rep for pajafouwdm-idstawvzh-vpajembpa contraction to encourage full muscle recruitment for strength & endurance. Progressing 03/07/25   Verbalize & demonstrate good understanding of home stretch routine to improve AROM, help decrease pain & improve mobility. Progressing 03/07/25   Demonstrate proper  "supine or seated diaphragmatic breathing with decreased chest excursion & emphasis on full abdominal excursion & increased expiration time to promote muscle relaxation & increased parasympathetic activity to improve patient tolerance to activities. Progressing 03/07/25   Verbalize good understanding of importance of proper posture in resisted exercise, functional activities & ADL's in order to help reduce postural strain, promote proper breathing. Progressing 03/07/25   Demonstrate good understanding of full body resisted exercises w/ use of pictures &/or verbal cues to promote independence w/ exercise at the end of the program. Progressing 03/07/25   Verbalize plan for continuing resisted exercises w/ specific location (i.e. commercial gym, home, community fitness center)  to incorporate all major muscle groups to maintain & progress therapeutic functional improvements. Progressing 03/07/25   Verbalize difference between  "hurt vs harm"  to demonstrate understanding of fear avoidance in pain cycle.  Progressing 03/07/25         Midterm: In 8 weeks, pt will:     Verbalize good understanding of activities planning/scheduling (stretching, mindfulness, resisted training, & cardio) prescribed by PT/OT following the end of the program to sustain & progress functional improvements. Progressing 03/07/25   Perform selected resisted training w/ minimal to no cuing in therapy session to be independent w/ resisted strengthening. Progressing 03/07/25   Demonstrate improved symptom self-management w/ posture/positioning and mechanical movements and/or implementation of appropriate modalities throughout a typical day.  Progressing 03/07/25   Demonstrate independence w/ home stretch routine to improve AROM, help decrease pain & improve mobility. Progressing 03/07/25         Long Term: In 12 weeks, pt will:     Perform selected cardio & resisted training independently to continue safe regular performance of daily exercise. " Progressing 03/07/25   Improve  6 MWT to 395 meters or greater to improve gait speed and mobility. Progressing 03/07/25   Perform LLE single leg stance 20 seconds or greater bilaterally to improve safety and balance in ADLs. Progressing 03/07/25   Demonstrate Timed Up & Go (with appropriate assistive device, if necessary) of 8 seconds or less to decrease fall risk and improve functional mobility. Progressing 03/07/25   Score 70% or more on ANKLE FOTO to indicate significant functional improvements in impaired functions secondary to pain. Progressing 03/07/25                PLAN     Continue PT per POC     Yelena Forerst, PTA

## 2025-03-17 NOTE — PROGRESS NOTES
"[facility]  Functional Restoration Program  Occupational Therapy Treatment Note  [facility] Day 13    Name: Stephan Wallace  MRN:[id number]  Encounter Date: 3/17/2025    Diagnosis:   Encounter Diagnosis   Name Primary?    Functional movement disorder Yes     Referring provider: Aleksandra Rodriguez NP    Physician Orders: eval and treat; [facility]  Medical Diagnosis:   F44.4 (ICD-10-CM) - Functional neurological symptom disorder with abnormal movement   R53.1,Z74.09,R68.89 (ICD-10-CM) - Decreased strength, endurance, and mobility  R53.83 (ICD-10-CM) - Fatigue, unspecified type  Evaluation Date: 1/10/2025  Insurance Authorization Period Expiration: 12/31/2025  Plan of Care Certification Period: 4/21/2025  Visit # / Visits authorized: 13/20 (plus eval)  FOTO completed: 1/3      Precautions:  Standard and Fall    Time In: 13:35, 15:35  Time Out: 14:30, 15:00  Total Billable Time: 80 minutes    SUBJECTIVE     She stated: "If I don't push through this pain I otherwise am not able to get anything finished".    Stephan was compliant with parts pf home exercise program given previously.     Response to previous treatment: Ms. Wallace noted: fatigued.    Functional change: increased awareness, working on breathing and safety in me messaging.     Pain:     Currently rating pain as 3/10 at start of session.  Functional Pain Scale Rating 0-10: within the last 24 hours  Date 1/10/2025 2/17/2025 3/6/2025   Average 5/10 5/10 7/10   Worst 7/10 8/10 9/10   Best 1/10 3/10 3/10   Composite score 4.33/10 5.33/10 6.33/10   [JESUS  is 1 point or 15-20% change in interference composite score (Aleshiain et al. 2008)]     Location: head/face, mouth, B hand    Description: fatigue, exhaustion  Aggravating Factors: anxiety, depression, stress    Easing Factors: anxiety meds     Patient Specific Activities based on Personal goals:  Activity  2/17/2025 3/14/2025     Performance (P) Satisfaction (S) P S   Driving 5 2 8 10   2.  Engaging in Taoist " activities 5 4 5 7   3.  Engaging in activities outside of work 5 4 6.5 4   4.  Walking (grocery store, in the park/festival 7 3 7 8   5.  Chores 6 2 4 4             Total Score  5.6 5  6.1 6.6   Ratin-10; unable/unsatisfied - Able/Satisfied     Objective     Objective Measures updated at progress report unless specified.    COGNITIVE Exam  Behaviors: pleasant, cooperative. Movements/tics throughout session, in face, UE. Reports difficulties initiation.  Memory: brain fog. Difficulty multitasking. Not tested during eval.  Safety awareness/insight to disability: impaired judgment and impaired insight; pushes through at work, unable to do this outside work.   Coping skills/emotional control: listening to music. During session anxious, but participating.     Visual/Perceptual:  Tracking: attempted to test; difficult tracking due to movements affecting maintaining eye on target.  Saccades: not tested  Acuity: wears glasses  Convergence: impaired, with decreased ability to accommodate.     Dominant hand: right     Active Range of Motion  Upper Extremity 1/10/2025    RUE LUE   Hands WFL WFL   Wrist WFL WFL   Elbows WFL WFL   Shoulders WFL WFL      Upper Extremity Strength - Manual Muscle Testing  Motion Tested 1/10/2025    Right Left    Shoulder Flexion 4+/5 5/5   Shoulder Extension 5/5 5/5   Shoulder Abduction 5/5 5/5   Shoulder IR 5/5 5/5   Shoulder ER 4/5 4+/5   Elbow Flexion 5/5 5/5   Elbow Extension 5/5 5/5       Strength (in pounds, rung III, average of three trials)   2025 3/7/2025     Left 40.67 lbs 55.33 lbs   Right 36.33 lbs 62.33 lbs    [norms for women aged 35-39: R=74.1 +/-10.8; L=66.3 +/-11.7 (Ed et al, 1985)]     Functional Strength  Pile Testing: Lifting    1/10/2025 (lbs/HR/CHIOMA) 2025  (lbs/HR/CHIOMA) 3/7/2025   (lbs/HR/CHIOMA)    Repetitive Floor to Waist NT due to time /   Repetitive Waist to Shoulder  /4  13/107/4    1- Time Maximum   /5 NT   Carry-2  Handed (40ft)   NT NT   Work demand Level   TBD Light (#20 max, frequent lifting/carrying #10, 2.5 METS      Reason for stop point   Body mechanics, time needed for reps Body mechanics, time needed for reps    comment Increased frustration reported after instruction given. Educated in markie. -  Shoulder elevation noted.   The work demand level listed above is based on the physical performance screening test performed. More comprehensive physical testing integrated with clinical findings would be required to derived an accurate work capacity.      Balance  5 times sit-stand (adults 18-63 y/o) 1/10/2025 3/6/2025    >12 sec= fall risk for general elderly  >16 sec= fall risk for Parkinson's disease  >10 sec= balance/vestibular dysfunction (<59 y/o)  >14.2 sec= balance/vestibular dysfunction (>59 y/o)  >12 sec= fall risk for CVA 13.47 seconds     (without UE used to push up from chair) 17.9 seconds     (With UE on thighs)            Endurance Testing: Modified Ramp Treadmill Test    1/10/2025 3/7/2025    Minutes Completed  3 minutes 5 seconds  3 minutes   % Grades  10 %  10%   Speed (mph)  2.1 mph attempted  1.7 mph   METS 4  4    bpm  122 bpm   Panchito Rating Perceived Exertion Scale   5  4   Reason for stop point Fatigue, Decline in maintaining pace safely , Increased discomfort  Increased discomfort   Comments Reports achilles pain left  -          Limitation/Restriction for FOTO NOC Survey     Therapist reviewed FOTO scores for Stephan D Encalade on 2/17/2025.  FOTO documents entered into [employer] - see Media section.     Limitation Score: 40%                 Treatment     Stephan received the treatments listed below:     Neuromuscular re-education activities to improve Coordination, Kinesthetic, Sense, Proprioception, and Posture for 25 minutes. The following activities were included:     In seated, review of PNS activating task performed at home, discussion of impact of pacing and also prioritizing rather than  shutting down or ignoring. Energy conservation principles reviewed.  Continued discussion of education re: mind body connection and importance of pacing with CHIOMA scale through awareness and connection vs shutting down. Pt engaged well and described education as sort of hard, but applicable.    Therapeutic exercises to develop strength, endurance, ROM, flexibility, posture, and core stabilization for 57 minutes, including:    Stephan participated in endurance activity using treadmill for 23 minutes, starting at level 1 to 1.9 to improve functional endurance and progress towards increased MET level for improved community mobility and participation, rated as Moderate (3/10) exertion, Stephan re-educated on how to add mindfulness component to activity and how to pace appropriately by completed self check ins and grading activity as needed, with goal to reach moderate to sort of hard by end of activity.     In supine on mat, shoulder stretching with and without use of 2lbs weighted dowel, for flexion, abduction and ER, followed by scapular mobility facilitated by trunk rotations side to side, with shoulder placed in internal rotation/flexion.    In seated EOM, D1/D2 flexion/extension patterns with unilateral body through UE weight bearing, for scapular retractions.    In seated EOM, reverse quadriped position with attempt to weight shift side to side alternating hip elevation of the mat.    No environmental, cultural, spiritual, developmental or education needs expressed or noted.    Patient Education and Home Exercises   Education provided:   - Progress towards goals   - importance of completion of exercises and activities outside of session to attain goals.   - proper posture and body mechanics.  - anticipated muscular soreness following lift testing and strategies for management including stretching, using ice, scheduled rest.  - Functional pain scale  - CHIOMA rate of perceived exertion scale.   - pain cycle  - pursed  "lip and diaphragmatic breathing techniques  - details of the Functional Restoration Program  - palming  - options for PNS activation: humming, singing out loud.   - hurt vs harm  - safety in me messaging vs danger in me  - brain/body connected  - importance of trunk stability for UE functions.  - overhead shoulder stretches using dowel    Written Home Exercises Provided: Patient instructed to cont prior HEP.  Stephan demonstrated good  understanding of the education provided.     See EMR under Patient Instructions for exercises provided during therapy sessions. Patient education also located in FRP binder provided on day 1 of the cohort.     Assessment     Ms. Wallace tolerated session well, although with discomfort in UE with movement overhead due to tendency to elevate shoulders, and decreased scapular mobility due to hunched posture, admitting to tendency to push through this pain. Throughout session today, good eye contact, and minimal onset of abnormal movements in UE and face noted.     Able to note benefit of pacing with application to returning to music, though noted that she was still experiencing "pushing" herself despite it being hard. Of note, Stephan named that this type of pushing felt different because it involved ultimately releasing tension rather than exacerbating it. Also engaged well in education re: pain science and mind-body connection, noting a tendency to favor mind over body. Engaged well in discussion of finding balance through use of CHIOMA scale.      Stephan is progressing towards her goals and status of goals can be seen below. Stephan's prognosis is fair, pending ability to  receiving and understanding education and participating in therapeutic intervention.     Stephan would continue to benefit from skilled outpatient occupational therapy to address the deficits listed in the problem list on initial evaluation, provide patient education, and to maximize patient's level of independence " in the home and community environment.     Anticipated barriers to occupational therapy: level of frustrations, h/o PTSD.    Goals:     SHORT Term goals: In 3 weeks, patient will:  Status of goal, reviewed 3/7/2025:   Implements correct use of breathing techniques during functional lifting tasks, with minimal cues needed. Progressing   Utilizes CHIOMA rate of exertion scale to pace performance with functional lifting tasks, and implements self-care strategies during activity participation to manage pain. NOT MET   Verbalize understanding of energy conservation and pacing strategies during daily habits, roles, and routines in order to improve tolerance for work and home demands.  NOT MET   Completes 5x sit to stand test in 12 seconds or less to improve ability to participate in community mobility.   NOT MET   Demonstrate increased positional tolerance to the level needed for work, home, and community activities (standing in cue at social event, managing supplies for outing, streneous IADL), as evidenced by having a METS level of 5. NOT MET   Demonstrate proper body mechanics with functional lifting tasks (floor to waist, waist to shoulder, maximum lift, and box carry), via teach back method with minimal cues needed. NOT MET   Demonstrate proper sequencing  when lifting waist to shoulder for management of (I)ADL, including dressing and grooming, placing items in kitchen cabinets, folding towels, and/or managing suitcase when traveling.   Progressing   Demonstrate proper sequencing when lifting floor to waist to meet demand of work/home routine, including lifting groceries, managing laundry, car parts, and/or managing suitcase when traveling.   NOT MET   Tolerate Home Activity Plan (HAP)/ Home Exercise Program (HEP) to promote safety and independence with ADL, with report of completion of at least 2 out of 4 days outside of program participation.  Progressing   Show improved perception of own abilities as evidenced by  increase of FOTO scores to established MDC value and perception of performance ratings regarding personal long term goals.  Progressing         Long Term goals: In 9 weeks, patient will: Status of goal, reviewed 3/7/2025:   Report implementation of application of mindfulness, stretching, and other coping strategies for improved participation in daily routine. Progressing   Verbalize functional application of lifting techniques in daily life (golfers lift, floor to waist, waist to shoulder). Progressing   Completes 5x sit to stand test in 9 seconds or less to improve ability to participate in community mobility.  Progressing   Applies functional application of lifting techniques (golfer's lift, floor to waist, waist to shoulder) in activities of daily life, per patient report.  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 Progressing   Have an improvement of 3 points in 2/5 personal goals in rating of  performance.  Progressing   Show improved perception of own abilities as evidenced by increase of FOTO scores to established MC II value and perception of performance ratings regarding personal long term goals.  Progressing      Patient Specific Goals; to be met after 2 month of (I) application of tools/techniques learned.  Vocational:  at work   Recreational: engaging in hobby, playing guitar, [employer] activities   Daily Living Activities: driving, chores   Measured by patient's self-reported performance and satisfaction of personal FRP goals, listed above in subjective section.   Total Score = sum of the activity performance scores / number of activities  Minimum detectable change (90%CI) for average score = 2 points  Minimum detectable change (90%CI) for single activity score  = 3 points    Plan      Continue per plan of care.     Updates/Grading for next session: progress as tolerated, FOTO, f/u energy conservation and pacing.    DAYO Sheehan, OTR/L, CEAS-I  3/17/2025

## 2025-03-17 NOTE — PROGRESS NOTES
Group Psychotherapy    Site: [facility]    Clinical status of patient: Outpatient    3/17/2025    Length of service:[id number]    Referred by: Functional Restoration Program     Number of patients in attendance: 3    Target symptoms: Chronic Pain Management     Patient's response to intervention:  The patient's response to intervention is active listening, self-disclosure.    Progress toward goals and other mental status changes:  The patient's progress toward goals is good.    Plan: group psychotherapy    Topics Covered: Pt attended CBT for Chronic Pain as part of their participation in Functional Restoration. Topics discussed today included Resiliency, Setbacks, Relapses and how they relate to chronic pain, depression, anxiety, and other mood disorders.

## 2025-03-18 NOTE — PROGRESS NOTES
I have met with the above student and agree with the assessment and note written. I was present for this encounter. Any changes have been made and authorized by me.  Zeynep Reyes, [facility] 03/18/2025 11:15 AM

## 2025-03-18 NOTE — PROGRESS NOTES
I have met with the above student and agree with the assessment and note written. I was present for this encounter. Any changes have been made and authorized by me.  Zeynep Reyes, [facility] 03/18/2025 10:49 AM

## 2025-03-18 NOTE — PATIENT INSTRUCTIONS
Flexors Stick Stretch, Supine        Lie on back, stick in both hands above chest. Extend both arms over head as far as possible. Hold 5 seconds.  Repeat 5 times per session. Do 2 sessions per day.    Copyright © I. All rights reserved

## 2025-03-19 ENCOUNTER — CLINICAL SUPPORT (OUTPATIENT)
Dept: REHABILITATION | Facility: OTHER | Age: 35
End: 2025-03-19
Payer: COMMERCIAL

## 2025-03-19 ENCOUNTER — CLINICAL SUPPORT (OUTPATIENT)
Dept: PSYCHIATRY | Facility: OTHER | Age: 35
End: 2025-03-19
Payer: COMMERCIAL

## 2025-03-19 DIAGNOSIS — F41.0 GENERALIZED ANXIETY DISORDER WITH PANIC ATTACKS: Primary | ICD-10-CM

## 2025-03-19 DIAGNOSIS — R68.89 DECREASED FUNCTIONAL ACTIVITY TOLERANCE: Primary | ICD-10-CM

## 2025-03-19 DIAGNOSIS — F41.1 GENERALIZED ANXIETY DISORDER WITH PANIC ATTACKS: Primary | ICD-10-CM

## 2025-03-19 DIAGNOSIS — G25.9 FUNCTIONAL MOVEMENT DISORDER: Primary | ICD-10-CM

## 2025-03-19 DIAGNOSIS — F33.1 MODERATE EPISODE OF RECURRENT MAJOR DEPRESSIVE DISORDER: ICD-10-CM

## 2025-03-19 PROCEDURE — 97112 NEUROMUSCULAR REEDUCATION: CPT | Mod: CQ

## 2025-03-19 PROCEDURE — 90791 PSYCH DIAGNOSTIC EVALUATION: CPT | Mod: ,,, | Performed by: SOCIAL WORKER

## 2025-03-19 PROCEDURE — 97110 THERAPEUTIC EXERCISES: CPT | Mod: CQ

## 2025-03-19 PROCEDURE — 97530 THERAPEUTIC ACTIVITIES: CPT | Mod: CQ

## 2025-03-19 PROCEDURE — 97530 THERAPEUTIC ACTIVITIES: CPT

## 2025-03-19 NOTE — PROGRESS NOTES
"[facility]  Functional Restoration Program  Occupational Therapy Treatment Note  [facility] Day 14    Name: Stephan Wallace  MRN:[id number]  Encounter Date: 3/19/2025    Diagnosis:   Encounter Diagnosis   Name Primary?    Functional movement disorder Yes       Referring provider: Aleksandra Rodriguez NP    Physician Orders: eval and treat; [facility]  Medical Diagnosis:   F44.4 (ICD-10-CM) - Functional neurological symptom disorder with abnormal movement   R53.1,Z74.09,R68.89 (ICD-10-CM) - Decreased strength, endurance, and mobility  R53.83 (ICD-10-CM) - Fatigue, unspecified type  Evaluation Date: 1/10/2025  Insurance Authorization Period Expiration: 12/31/2025  Plan of Care Certification Period: 4/21/2025  Visit # / Visits authorized: 14/20 (plus eval)  FOTO completed: 2/3      Precautions:  Standard and Fall    Time In: 1330  Time Out: 1430  Total Billable Time: 60 minutes    SUBJECTIVE     She stated: "I may not get to the point where I feel completely safe in my body. But a little bit of trust goes a long way" "I dread coming here everyday, but that's changed. The worry isn't there about how that will make y'all feel"     Stephan was compliant with parts pf home exercise program given previously.     Response to previous treatment: Ms. Wallace noted: fatigued.    Functional change: increased awareness, working on breathing and safety in me messaging.     Pain:     Currently rating pain as 2/10 at start of session.  Functional Pain Scale Rating 0-10: within the last 24 hours  Date 1/10/2025 2/17/2025 3/6/2025   Average 5/10 5/10 7/10   Worst 7/10 8/10 9/10   Best 1/10 3/10 3/10   Composite score 4.33/10 5.33/10 6.33/10   [JESUS  is 1 point or 15-20% change in interference composite score (Cleo et al. 2008)]     Location: head/face, mouth, B hand    Description: fatigue, exhaustion  Aggravating Factors: anxiety, depression, stress    Easing Factors: anxiety meds     Patient Specific Activities based on Personal " goals:  Activity  2025 3/14/2025     Performance (P) Satisfaction (S) P S   Driving 5 2 8 10   2.  Engaging in Taoist activities 5 4 5 7   3.  Engaging in activities outside of work 5 4 6.5 4   4.  Walking (grocery store, in the park/festival 7 3 7 8   5.  Chores 6 2 4 4             Total Score  5.6 5  6.1 6.6   Ratin-10; unable/unsatisfied - Able/Satisfied     Objective     Objective Measures updated at progress report unless specified.    COGNITIVE Exam  Behaviors: pleasant, cooperative. Movements/tics throughout session, in face, UE. Reports difficulties initiation.  Memory: brain fog. Difficulty multitasking. Not tested during eval.  Safety awareness/insight to disability: impaired judgment and impaired insight; pushes through at work, unable to do this outside work.   Coping skills/emotional control: listening to music. During session anxious, but participating.     Visual/Perceptual:  Tracking: attempted to test; difficult tracking due to movements affecting maintaining eye on target.  Saccades: not tested  Acuity: wears glasses  Convergence: impaired, with decreased ability to accommodate.     Dominant hand: right     Active Range of Motion  Upper Extremity 1/10/2025    RUE LUE   Hands WFL WFL   Wrist WFL WFL   Elbows WFL WFL   Shoulders WFL WFL      Upper Extremity Strength - Manual Muscle Testing  Motion Tested 1/10/2025    Right Left    Shoulder Flexion 4+/5 5/5   Shoulder Extension 5/5 5/5   Shoulder Abduction 5/5 5/5   Shoulder IR 5/5 5/5   Shoulder ER 4/5 4+/5   Elbow Flexion 5/5 5/5   Elbow Extension 5/5 5/5       Strength (in pounds, rung III, average of three trials)   2025 3/7/2025     Left 40.67 lbs 55.33 lbs   Right 36.33 lbs 62.33 lbs    [norms for women aged 35-39: R=74.1 +/-10.8; L=66.3 +/-11.7 (Ed et al, 1985)]     Functional Strength  Pile Testing: Lifting    1/10/2025 (lbs/HR/CHIOMA) 2025  (lbs/HR/CHIOMA) 3/7/2025   (lbs/HR/CHIOMA)    Repetitive Floor to Waist NT due  to time 8/nt/5 8/109/5   Repetitive Waist to Shoulder  18/101/4  13/107/4    1- Time Maximum   24/97/5 NT   Carry-2 Handed (40ft)   NT NT   Work demand Level   TBD Light (#20 max, frequent lifting/carrying #10, 2.5 METS      Reason for stop point   Body mechanics, time needed for reps Body mechanics, time needed for reps    comment Increased frustration reported after instruction given. Educated in [facility]. -  Shoulder elevation noted.   The work demand level listed above is based on the physical performance screening test performed. More comprehensive physical testing integrated with clinical findings would be required to derived an accurate work capacity.      Balance  5 times sit-stand (adults 18-63 y/o) 1/10/2025 3/6/2025    >12 sec= fall risk for general elderly  >16 sec= fall risk for Parkinson's disease  >10 sec= balance/vestibular dysfunction (<61 y/o)  >14.2 sec= balance/vestibular dysfunction (>61 y/o)  >12 sec= fall risk for CVA 13.47 seconds     (without UE used to push up from chair) 17.9 seconds     (With UE on thighs)            Endurance Testing: Modified Ramp Treadmill Test    1/10/2025 3/7/2025    Minutes Completed  3 minutes 5 seconds  3 minutes   % Grades  10 %  10%   Speed (mph)  2.1 mph attempted  1.7 mph   METS 4  4    bpm  122 bpm   Panchito Rating Perceived Exertion Scale   5  4   Reason for stop point Fatigue, Decline in maintaining pace safely , Increased discomfort  Increased discomfort   Comments Reports achilles pain left  -          Limitation/Restriction for FOTO NOC Survey     Therapist reviewed FOTO scores for Stephan VILMA Encalade on 3/19/2025.  FOTO documents entered into [employer] - see Media section.     Limitation Score: 32%                 Treatment     Stephan received the treatments listed below:        Therapeutic Activities  to develop strength, endurance, ROM, flexibility, posture, and core stabilization for ADL performance for 60 minutes, including:    Stephan participated in  endurance activity using Nustep for 15 minutes, starting at level 1.0 to 3.3 to improve functional endurance and progress towards increased MET level for improved community mobility and participation, rated as Moderate (3/10) exertion, Stephan re-educated on how to add mindfulness component to activity and how to pace appropriately by completed self check ins and grading activity as needed, with goal to reach moderate to sort of hard by end of activity. Completed 1353 steps; resistance at 3.3.   Of note, when cued to include UE in exercise, rated activity as sort of hard 4/10.     Functional reaching activity, education on activation of scapula to assist with lift. Cues to slow pace, noted to favor rushing. Rated as sort of hard 4/10.     Standing dowel exercises to facilitate activation of shoulder and scapula. External cues for pacing required. Overhead, PN1 & 2, and resting dowel on upper trap behind head while performing cervical stretches. Rated as sort of hard 4/10, but noted to yawn and demonstrate other patterns consistent with PNS activation.    Stretches reviewed for application to daily function, standing sway with flexion/extension patterning at UE, standing foldover stretch on countertop. Rated as moderate 3/10.       No environmental, cultural, spiritual, developmental or education needs expressed or noted.    Patient Education and Home Exercises   Education provided:   - Progress towards goals   - importance of completion of exercises and activities outside of session to attain goals.   - proper posture and body mechanics.  - anticipated muscular soreness following lift testing and strategies for management including stretching, using ice, scheduled rest.  - Functional pain scale  - CHIOMA rate of perceived exertion scale.   - pain cycle  - pursed lip and diaphragmatic breathing techniques  - details of the Functional Restoration Program  - ambrosio  - options for PNS activation: humming, singing out  loud.   - hurt vs harm  - safety in me messaging vs danger in me  - brain/body connected  - importance of trunk stability for UE functions.  - overhead shoulder stretches using dowel    Written Home Exercises Provided: Patient instructed to cont prior HEP.  Stephan demonstrated good  understanding of the education provided.     See EMR under Patient Instructions for exercises provided during therapy sessions. Patient education also located in [facility] binder provided on day 1 of the cohort.     Assessment     Ms. Wallace engaged well in today's session, verbalizing application of HEP and education outside of clinic with benefit, endorsed awareness of sense of safety and security at [facility] to be linked to decreased physical tension.  Good response ot use of feeling words and also to use movements that activated PNS, with ability to note decreased tension, pain and uncontrolled movement appreciated.     Stephan is progressing towards her goals and status of goals can be seen below. Stephan's prognosis is fair, pending ability to  receiving and understanding education and participating in therapeutic intervention.     Stephan would continue to benefit from skilled outpatient occupational therapy to address the deficits listed in the problem list on initial evaluation, provide patient education, and to maximize patient's level of independence in the home and community environment.     Anticipated barriers to occupational therapy: level of frustrations, h/o PTSD.    Goals:     SHORT Term goals: In 3 weeks, patient will:  Status of goal, reviewed 3/7/2025:   Implements correct use of breathing techniques during functional lifting tasks, with minimal cues needed. Progressing   Utilizes CHIOMA rate of exertion scale to pace performance with functional lifting tasks, and implements self-care strategies during activity participation to manage pain. NOT MET   Verbalize understanding of energy conservation and pacing strategies during  daily habits, roles, and routines in order to improve tolerance for work and home demands.  NOT MET   Completes 5x sit to stand test in 12 seconds or less to improve ability to participate in community mobility.   NOT MET   Demonstrate increased positional tolerance to the level needed for work, home, and community activities (standing in cue at social event, managing supplies for outing, streneous IADL), as evidenced by having a METS level of 5. NOT MET   Demonstrate proper body mechanics with functional lifting tasks (floor to waist, waist to shoulder, maximum lift, and box carry), via teach back method with minimal cues needed. NOT MET   Demonstrate proper sequencing  when lifting waist to shoulder for management of (I)ADL, including dressing and grooming, placing items in kitchen cabinets, folding towels, and/or managing suitcase when traveling.   Progressing   Demonstrate proper sequencing when lifting floor to waist to meet demand of work/home routine, including lifting groceries, managing laundry, car parts, and/or managing suitcase when traveling.   NOT MET   Tolerate Home Activity Plan (HAP)/ Home Exercise Program (HEP) to promote safety and independence with ADL, with report of completion of at least 2 out of 4 days outside of program participation.  Progressing   Show improved perception of own abilities as evidenced by increase of FOTO scores to established MDC value and perception of performance ratings regarding personal long term goals.  Progressing         Long Term goals: In 9 weeks, patient will: Status of goal, reviewed 3/7/2025:   Report implementation of application of mindfulness, stretching, and other coping strategies for improved participation in daily routine. Progressing   Verbalize functional application of lifting techniques in daily life (golfers lift, floor to waist, waist to shoulder). Progressing   Completes 5x sit to stand test in 9 seconds or less to improve ability to participate  in community mobility.  Progressing   Applies functional application of lifting techniques (golfer's lift, floor to waist, waist to shoulder) in activities of daily life, per patient report.  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 Progressing   Have an improvement of 3 points in 2/5 personal goals in rating of  performance.  Progressing   Show improved perception of own abilities as evidenced by increase of FOTO scores to established MC II value and perception of performance ratings regarding personal long term goals.  Progressing      Patient Specific Goals; to be met after 2 month of (I) application of tools/techniques learned.  Vocational:  at work   Recreational: engaging in hobby, playing guitar, Spiritism activities   Daily Living Activities: driving, chores   Measured by patient's self-reported performance and satisfaction of personal FRP goals, listed above in subjective section.   Total Score = sum of the activity performance scores / number of activities  Minimum detectable change (90%CI) for average score = 2 points  Minimum detectable change (90%CI) for single activity score  = 3 points    Plan     Continue per plan of care.     Updates/Grading for next session: progress as tolerated, energy conservation and pacing      MAYRA Mccain   3/19/2025

## 2025-03-20 NOTE — PROGRESS NOTES
"[facility]    Functional Restoration Program  Physical Therapy Treatment Note  [facility]P Day 14    Name: Stephan Wallace  MRN:[id number]      Therapy Diagnosis:   Encounter Diagnosis   Name Primary?    Decreased functional activity tolerance Yes         Physician: Aleksandra Rodriguez NP    Date: 3/20/2025    Physician Orders: PT Eval and Treat   Medical Diagnosis from Referral:   F44.4 (ICD-10-CM) - Functional neurological symptom disorder with abnormal movement   R53.1,Z74.09,R68.89 (ICD-10-CM) - Decreased strength, endurance, and mobility   R53.83 (ICD-10-CM) - Fatigue, unspecified type      Evaluation Date: 1/10/2025  Authorization Period Expiration: 12/23/25  Plan of Care Expiration: 04/04/25  Visit # / Visits authorized: 14/15   FOTO: 02/ 03       Precautions: Standard     Time In: 2:30 pm  Time Out: 3:30 pm  Total Billable Time: 40 mins      SUBJECTIVE     Stephan reports less discomfort this afternoon than she has had since starting the program. Pt states she has been performing the lumbar stretches, breathing activity and walking at home on weekends. When she gets home from work she is exhausted. Pt states she had some "me time" over the weekend. Pt states she still has not looked at the resisted equipment they have at home. Pt c/o R knee pain which was worse yesterday but improved tooday. Yesterday it almost gave out.      Patient's FRP goals:  "go back to normalcy" dec random movements, improved speech pattern     From past sessions:   FRP seems to bring on more frustration.   Work is very stressful.  Self motivation is difficult      Current 2/10  Location(s): facial  and UE symptoms, effected speech    OBJECTIVE     Objective Measures updated at progress report unless specified.       Limitation/Restriction for FOTO ANKLE Survey     Therapist reviewed FOTO scores for Stephan Wallace on 1/10/2025.   FOTO documents entered into "[employer]" - see Media section.     INTAKE Score 1/10/2025: " 63% (53/80 LEFS)   03/07/25: 65% (58/80 LEFS)      Predicted Score: 70%     MDC = 9 points             Treatment       Stephan received the Individualized Treatments listed below:     Stephan participated in dynamic functional therapeutic activities to improve functional performance for 15 minutes, including:     Mindfulness: Body scan, Progressive Muscle Relaxation  Mindfulness-based activity involving deep breathing, mindful awareness of the body and headspace  Used to activate parasympathetic nervous system to decrease autonomic threat perception and thus reduce central sensitivity to pain  Patient demonstrate safe performance     Somatic Tracking:  Mindfulness-based, guided imagery to reduce fear-avoidance and catastrophizing around physical symptoms in body  Guided patient through attending to negative (painful), neutral, and positive sensations in the body  Used imagery and metaphors to reduce fear and hyperfixation on symptoms  Used tennis ball in hands and breathing as grounding sensations to reduce fear upon examining sx in body    Functional Game Activity  Used Ball Toss activity to improve reaching, stepping, reaction to external stimuli, and to train dynamic balance  Pt required to bend, reach,  objects from floor, and throw  Improved accuracy and functional mechanics over course of activity  Cues provided for postural awareness    NP:   Functional Activity Endurance   DATE ACTIVITY DURATION DETAILS OTHER/PRE   2/21/25 SciFit 12 min Level 1 3/10   02/26/25 SciFit 19 min Level 1 4/10   02/28/25 SciFit 10:30 min Level 1  3/10   02/28/25 Hallway ambulation 5 min  4/10   3/5/25 Sci Fit 20 min Level 1, no UE's 4/10   3/12/25 TM 12 min  5/10   3/14/25 Stationary bike 12 min Level 2 5/10 (knee pain)   3/18/25 Stationary bike 8 min Level 1 4/10   3/18/2025 Up/down   6 flight of stairs 4/10       Stephan participated in neuromuscular re-education activities to improve: Balance, Coordination, Kinesthetic,  Sense, Proprioception, and Posture for 30 minutes. The following activities were included:    Peripheral muscle strengthening which included 1-2 sets of 10-20 repetitions at a slow, controlled 5-7 second per rep pace focused on strengthening supporting musculature for improved body mechanics & functional mobility.  Patient & therapist focused on proper form & speed during treatment to ensure optimal strengthening of each targeted muscle group & neuromuscular re-education. Patients are instructed to keep sets/reps with proper load to stay at 3-5 out of 10 on the provided modified Panchito exertion scale.    [employer] Machine Exercises Weight (lbs) Sets Repetitions Panchito Exertion Scale Rating   Leg Extension        Hamstring Curls       Chest Press 22.5 1 20 4   Pec Fly 20 1 15 4   Lat Pull Down       Mid Row       Bicep Bar Curls       Standing Tricep Extension       Single Leg Press  R/L 30 1 20        Balance:NP  Narrow stance on Airex pad  2x20 tosses with beach ball  Challenged dynamic stability and provided mental distraction to reduce functional neurological sx      Fitter Board: 20 x2 taps             PRE 4/10   Lat - single HH, uneven taps, cues for decrease lean     Ant/post - B HH, cues for decrease for/back trunk movement, increased discomfort in achilles    Chazmin received therapeutic exercises to develop strength, endurance, ROM, flexibility, posture, and core stabilization for 15 minutes including:    Prone knee stretch c/ strap 30 sec x3  DARLING x1 min  Prone press ups x10   Supine SAQ L over bolster x15  (PRE 3/10)      Patient Education and Home Exercises     Home Exercises Provided and Patient Education Provided     Education provided:   - FRP Educational Topics: Physical & Psychological Pain Cycles, Diaphragmatic Breathing, Activity Pacing for Pain & Debility , Resisted Exercise Basics, Mindfulness Resources, and Posture & Body Mechanics      Written Home Exercises Provided: Patient instructed to cont prior  HEP. Exercises were reviewed and Stephan was able to demonstrate them prior to the end of the session.  Stephan demonstrated good  understanding of the education provided. See EMR under Patient Instructions for information provided during therapy sessions    ASSESSMENT     Stephan participated well in PT session despite fire alarm interruption. Pt was able to perform stair descending and ascending of 6 flights with minor c/o knee pain. Pt demonstrating improved self efficacy with performance of lumbar stretches daily to assist with LBP management. Pt also recognizing stressors and being mindful to calm the NS to assist with adverse reaction.  Pt has resisted equipment and stationary bike at home but admits self motivation is an issue to performing them. She may use free weights and resisted band from previous workouts.  Cont to educate on psychosocial relationship between stress and pain/symptoms and calming the NS and decreasing self judgement is how to manage.         Stephan Is progressing poor to fair towards her goals.   Pt prognosis is Good.     Pt will continue to benefit from skilled outpatient physical therapy to address the deficits listed in the problem list box on initial evaluation, provide pt/family education and to maximize pt's level of independence in the home and community environment.     Pt's spiritual, cultural and educational needs considered and pt agreeable to plan of care and goals.     Anticipated Barriers for therapy: chronic nature of issues, psychosocial factors, central sensitization    GOALS: Pt is in agreement with the following goals:  Goal Progress Towards Goal   SHORT Term: In 3 weeks, pt will:     Verbalize & demonstrate good understanding of resisted training with 3-1-3 second rep for [id number] contraction to encourage full muscle recruitment for strength & endurance. Progressing 03/07/25   Verbalize & demonstrate good understanding of home stretch routine to  "improve AROM, help decrease pain & improve mobility. Progressing 03/07/25   Demonstrate proper supine or seated diaphragmatic breathing with decreased chest excursion & emphasis on full abdominal excursion & increased expiration time to promote muscle relaxation & increased parasympathetic activity to improve patient tolerance to activities. Progressing 03/07/25   Verbalize good understanding of importance of proper posture in resisted exercise, functional activities & ADL's in order to help reduce postural strain, promote proper breathing. Progressing 03/07/25   Demonstrate good understanding of full body resisted exercises w/ use of pictures &/or verbal cues to promote independence w/ exercise at the end of the program. Progressing 03/07/25   Verbalize plan for continuing resisted exercises w/ specific location (i.e. commercial gym, home, community fitness center)  to incorporate all major muscle groups to maintain & progress therapeutic functional improvements. Progressing 03/07/25   Verbalize difference between  "hurt vs harm"  to demonstrate understanding of fear avoidance in pain cycle.  Progressing 03/07/25         Midterm: In 8 weeks, pt will:     Verbalize good understanding of activities planning/scheduling (stretching, mindfulness, resisted training, & cardio) prescribed by PT/OT following the end of the program to sustain & progress functional improvements. Progressing 03/07/25   Perform selected resisted training w/ minimal to no cuing in therapy session to be independent w/ resisted strengthening. Progressing 03/07/25   Demonstrate improved symptom self-management w/ posture/positioning and mechanical movements and/or implementation of appropriate modalities throughout a typical day.  Progressing 03/07/25   Demonstrate independence w/ home stretch routine to improve AROM, help decrease pain & improve mobility. Progressing 03/07/25         Long Term: In 12 weeks, pt will:     Perform selected cardio & " resisted training independently to continue safe regular performance of daily exercise. Progressing 03/07/25   Improve  6 MWT to 395 meters or greater to improve gait speed and mobility. Progressing 03/07/25   Perform LLE single leg stance 20 seconds or greater bilaterally to improve safety and balance in ADLs. Progressing 03/07/25   Demonstrate Timed Up & Go (with appropriate assistive device, if necessary) of 8 seconds or less to decrease fall risk and improve functional mobility. Progressing 03/07/25   Score 70% or more on ANKLE FOTO to indicate significant functional improvements in impaired functions secondary to pain. Progressing 03/07/25                PLAN     Continue PT per POC     Yelena Forrest, PTA

## 2025-03-21 ENCOUNTER — CLINICAL SUPPORT (OUTPATIENT)
Dept: REHABILITATION | Facility: OTHER | Age: 35
End: 2025-03-21
Payer: COMMERCIAL

## 2025-03-21 ENCOUNTER — CLINICAL SUPPORT (OUTPATIENT)
Dept: PSYCHIATRY | Facility: OTHER | Age: 35
End: 2025-03-21
Payer: COMMERCIAL

## 2025-03-21 DIAGNOSIS — G25.9 FUNCTIONAL MOVEMENT DISORDER: Primary | ICD-10-CM

## 2025-03-21 DIAGNOSIS — F41.0 GENERALIZED ANXIETY DISORDER WITH PANIC ATTACKS: Primary | ICD-10-CM

## 2025-03-21 DIAGNOSIS — R68.89 DECREASED FUNCTIONAL ACTIVITY TOLERANCE: Primary | ICD-10-CM

## 2025-03-21 DIAGNOSIS — F41.1 GENERALIZED ANXIETY DISORDER WITH PANIC ATTACKS: Primary | ICD-10-CM

## 2025-03-21 PROCEDURE — 97530 THERAPEUTIC ACTIVITIES: CPT

## 2025-03-21 PROCEDURE — 97112 NEUROMUSCULAR REEDUCATION: CPT

## 2025-03-21 PROCEDURE — 90832 PSYTX W PT 30 MINUTES: CPT | Mod: ,,, | Performed by: SOCIAL WORKER

## 2025-03-21 NOTE — PROGRESS NOTES
The team met with Stephan Wallace for 30 minutes regarding pt's progress in the program and the plans moving forward. Please refer to individual notes for plans of care.  All questions answered.    Important milestones achieved during the program:  You had a 8% improvement in your fears that activity will cause pain.  Your anxiety about your pain improved by 8%  Your overall sense that pain is keeping you from doing the things you want to do decreased by 46%  When you look specifically at your neck and back pains, your sense that your neck pain is keeping you from doing the things you want to do decreased by n/a% and your back pain by 12%  Your mood improved by 13%  Your sleep improved by 34%    This is remarkable!  Keep up the good work!

## 2025-03-21 NOTE — PROGRESS NOTES
"[facility]  Functional Restoration Program  Occupational Therapy Treatment Note  [facility] Day 15    Name: Stephan Wallace  MRN:[id number]  Encounter Date: 3/21/2025    Diagnosis:   Encounter Diagnosis   Name Primary?    Functional movement disorder Yes     Referring provider: Aleksandra Rodriguez NP    Physician Orders: eval and treat; [facility]  Medical Diagnosis:   F44.4 (ICD-10-CM) - Functional neurological symptom disorder with abnormal movement   R53.1,Z74.09,R68.89 (ICD-10-CM) - Decreased strength, endurance, and mobility  R53.83 (ICD-10-CM) - Fatigue, unspecified type  Evaluation Date: 1/10/2025  Insurance Authorization Period Expiration: 12/31/2025  Plan of Care Certification Period: 4/21/2025  Visit # / Visits authorized: 14/20 (plus eval)  FOTO completed: 2/3      Precautions:  Standard and Fall    Time In: 14:34  Time Out: 15:27  Total Billable Time: 53 minutes    SUBJECTIVE     She stated: "I don't know if I can keep up with the changes if I am not coming". "My family is telling me to apply for disability. I like my job, but I have been calling in because of the migraines, so I don't know if I am going to be financially stable to move on my own". "I might look better on the outside, but I don't feel better on the inside".    Stephan was compliant with parts of home exercise program given previously.     Response to previous treatment: Ms. Wallace noted: no concerns reported.    Functional change: increased awareness, working on breathing and safety in me messaging.     Pain:     Currently rating pain as 5/10 at start of session.  Functional Pain Scale Rating 0-10: within the last 24 hours  Date 1/10/2025 2/17/2025 3/6/2025   Average 5/10 5/10 7/10   Worst 7/10 8/10 9/10   Best 1/10 3/10 3/10   Composite score 4.33/10 5.33/10 6.33/10   [JESUS  is 1 point or 15-20% change in interference composite score (Cleo et al. 2008)]     Location: head/face, mouth, B hand    Description: fatigue, " exhaustion  Aggravating Factors: anxiety, depression, stress    Easing Factors: anxiety meds     Patient Specific Activities based on Personal goals:  Activity  2025 3/14/2025     Performance (P) Satisfaction (S) P S   Driving 5 2 8 10   2.  Engaging in [employer] activities 5 4 5 7   3.  Engaging in activities outside of work 5 4 6.5 4   4.  Walking (grocery store, in the park/festival 7 3 7 8   5.  Chores 6 2 4 4             Total Score  5.6 5  6.1 6.6   Ratin-10; unable/unsatisfied - Able/Satisfied     Objective     Objective Measures updated at progress report unless specified.    COGNITIVE Exam  Behaviors: pleasant, cooperative. Movements/tics throughout session, in face, UE. Reports difficulties initiation.  Memory: brain fog. Difficulty multitasking. Not tested during eval.  Safety awareness/insight to disability: impaired judgment and impaired insight; pushes through at work, unable to do this outside work.   Coping skills/emotional control: listening to music. During session anxious, but participating.     Visual/Perceptual:  Tracking: attempted to test; difficult tracking due to movements affecting maintaining eye on target.  Saccades: not tested  Acuity: wears glasses  Convergence: impaired, with decreased ability to accommodate.     Dominant hand: right     Active Range of Motion  Upper Extremity 1/10/2025    RUE LUE   Hands WFL WFL   Wrist WFL WFL   Elbows WFL WFL   Shoulders WFL WFL      Upper Extremity Strength - Manual Muscle Testing  Motion Tested 1/10/2025    Right Left    Shoulder Flexion 4+/5 5/5   Shoulder Extension 5/5 5/5   Shoulder Abduction 5/5 5/5   Shoulder IR 5/5 5/5   Shoulder ER 4/5 4+/5   Elbow Flexion 5/5 5/5   Elbow Extension 5/5 5/5       Strength (in pounds, rung III, average of three trials)   2025 3/7/2025     Left 40.67 lbs 55.33 lbs   Right 36.33 lbs 62.33 lbs    [norms for women aged 35-39: R=74.1 +/-10.8; L=66.3 +/-11.7 (Ed et al, 1985)]     Functional  Strength  Pile Testing: Lifting    1/10/2025 (lbs/HR/PANCHITO) 2/26/2025  (lbs/HR/PANCHITO) 3/7/2025   (lbs/HR/PANCHITO)    Repetitive Floor to Waist NT due to time 8/nt/5 8/109/5   Repetitive Waist to Shoulder  18/101/4 13/107/4    1- Time Maximum   24/97/5 NT   Carry-2 Handed (40ft)   NT NT   Work demand Level   TBD Light (#20 max, frequent lifting/carrying #10, 2.5 METS      Reason for stop point   Body mechanics, time needed for reps Body mechanics, time needed for reps    comment Increased frustration reported after instruction given. Educated in [employer]. -  Shoulder elevation noted.   The work demand level listed above is based on the physical performance screening test performed. More comprehensive physical testing integrated with clinical findings would be required to derived an accurate work capacity.      Balance  5 times sit-stand (adults 18-65 y/o) 1/10/2025 3/6/2025    >12 sec= fall risk for general elderly  >16 sec= fall risk for Parkinson's disease  >10 sec= balance/vestibular dysfunction (<61 y/o)  >14.2 sec= balance/vestibular dysfunction (>61 y/o)  >12 sec= fall risk for CVA 13.47 seconds     (without UE used to push up from chair) 17.9 seconds     (With UE on thighs)            Endurance Testing: Modified Ramp Treadmill Test    1/10/2025 3/7/2025    Minutes Completed  3 minutes 5 seconds  3 minutes   % Grades  10 %  10%   Speed (mph)  2.1 mph attempted  1.7 mph   METS 4  4    bpm  122 bpm   Panchito Rating Perceived Exertion Scale   5  4   Reason for stop point Fatigue, Decline in maintaining pace safely , Increased discomfort  Increased discomfort   Comments Reports achilles pain left  -          Limitation/Restriction for FOTO NOC Survey     Therapist reviewed FOTO scores for Stephan D Encalade on 3/19/2025.  FOTO documents entered into [employer] - see Media section.     Limitation Score: 32%                 Treatment   Stephan received the treatments listed below:     Therapeutic activities and functional  lifting activities in order to increase participation in, endurance for, and performance with vocational, selfcare ADLs, and leisure activities in order to improve quality of life, for 53 minutes, including:    Ball toss for changing focus, as with increased symptoms at start of session.     Functional mobility, and shifting focus on plant life, x 2 blocks. Rounded shoulders with keeping hands in pockets, further facilitation hunched posture. Spoke about lack of confidence in being able to make changes without attending FR cohort. Discussed using tracking of chores/priority list to help keep accountability.    Stephan participated in functional lift floor to waist, 5 reps x 7 lbs with a rating of Hard (5/10) exertion. Stephan educated on standing posture, core awareness, keeping shoulders depressed and retracted, stepping to place > reaching to place, keeping weight close to center of gravity and pacing as needed. Use of chair to review sequence, as tendency to over extend knee when shifting weight back. Use of physio ball walk outs.     No environmental, cultural, spiritual, developmental or education needs expressed or noted.    Patient Education and Home Exercises   Education provided:   - Progress towards goals   - importance of completion of exercises and activities outside of session to attain goals.   - proper posture and body mechanics.  - anticipated muscular soreness following lift testing and strategies for management including stretching, using ice, scheduled rest.  - Functional pain scale  - CHIOMA rate of perceived exertion scale.   - pain cycle  - pursed lip and diaphragmatic breathing techniques  - details of the Functional Restoration Program  - palming  - options for PNS activation: humming, singing out loud.   - hurt vs harm  - safety in me messaging vs danger in me  - brain/body connected  - importance of trunk stability for UE functions.  - overhead shoulder stretches using dowel    Written Home  Exercises Provided: Patient instructed to cont prior HEP.  Stephan demonstrated good  understanding of the education provided.     See EMR under Patient Instructions for exercises provided during therapy sessions. Patient education also located in FRP binder provided on day 1 of the cohort.     Assessment     Ms. Wallace teary-eyed during session, verbalizing lack of confidence in ability to follow through with FRP tools and techniques, not falling back into old habits, and concerns about being able to stay with current employer. During session, with continued nonfunctional movements, especially in face.     Stehpan is progressing towards her goals and status of goals can be seen below. Stephan's prognosis is fair, pending ability to  receiving and understanding education and participating in therapeutic intervention.     Stephan would continue to benefit from applying tools and techniques learned to address the deficits listed in the problem list on initial evaluation, provide patient education, and to maximize patient's level of independence in the home and community environment.     Anticipated barriers to occupational therapy: level of frustrations, h/o PTSD.    Goals:     SHORT Term goals: In 3 weeks, patient will:  Status of goal, reviewed 3/7/2025:   Implements correct use of breathing techniques during functional lifting tasks, with minimal cues needed. Progressing   Utilizes CHIOMA rate of exertion scale to pace performance with functional lifting tasks, and implements self-care strategies during activity participation to manage pain. NOT MET   Verbalize understanding of energy conservation and pacing strategies during daily habits, roles, and routines in order to improve tolerance for work and home demands.  NOT MET   Completes 5x sit to stand test in 12 seconds or less to improve ability to participate in community mobility.   NOT MET   Demonstrate increased positional tolerance to the level needed for  work, home, and community activities (standing in cue at social event, managing supplies for outing, streneous IADL), as evidenced by having a METS level of 5. NOT MET   Demonstrate proper body mechanics with functional lifting tasks (floor to waist, waist to shoulder, maximum lift, and box carry), via teach back method with minimal cues needed. NOT MET   Demonstrate proper sequencing  when lifting waist to shoulder for management of (I)ADL, including dressing and grooming, placing items in kitchen cabinets, folding towels, and/or managing suitcase when traveling.   Progressing   Demonstrate proper sequencing when lifting floor to waist to meet demand of work/home routine, including lifting groceries, managing laundry, car parts, and/or managing suitcase when traveling.   NOT MET   Tolerate Home Activity Plan (HAP)/ Home Exercise Program (HEP) to promote safety and independence with ADL, with report of completion of at least 2 out of 4 days outside of program participation.  Progressing   Show improved perception of own abilities as evidenced by increase of FOTO scores to established MDC value and perception of performance ratings regarding personal long term goals.  Progressing         Long Term goals: In 9 weeks, patient will: Status of goal, reviewed 3/7/2025:   Report implementation of application of mindfulness, stretching, and other coping strategies for improved participation in daily routine. Progressing   Verbalize functional application of lifting techniques in daily life (golfers lift, floor to waist, waist to shoulder). Progressing   Completes 5x sit to stand test in 9 seconds or less to improve ability to participate in community mobility.  Progressing   Applies functional application of lifting techniques (golfer's lift, floor to waist, waist to shoulder) in activities of daily life, per patient report.  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 Progressing   Have an improvement of 3 points in 2/5 personal goals in rating of  performance.  Progressing   Show improved perception of own abilities as evidenced by increase of FOTO scores to established MC II value and perception of performance ratings regarding personal long term goals.  Progressing      Patient Specific Goals; to be met after 2 month of (I) application of tools/techniques learned.  Vocational:  at work   Recreational: engaging in hobby, playing [employer], [employer] activities   Daily Living Activities: driving, chores   Measured by patient's self-reported performance and satisfaction of personal FRP goals, listed above in subjective section.   Total Score = sum of the activity performance scores / number of activities  Minimum detectable change (90%CI) for average score = 2 points  Minimum detectable change (90%CI) for single activity score  = 3 points    Plan     Continue per plan of care; return in 2 months after independence application of tools and techniques learned, to take measures and review plan of care.     DAYO Sheehan, OTR/L, CEAS-I  3/21/2025
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
## 2025-03-21 NOTE — PROGRESS NOTES
"[facility]    Functional Restoration Program  Physical Therapy Treatment Note  FRP Day 15    Name: Stephan Wallace  MRN:[id number]      Therapy Diagnosis:   Encounter Diagnosis   Name Primary?    Decreased functional activity tolerance Yes     Physician: Aleksandra Rodriguez NP    Date: 3/21/2025    Physician Orders: PT Eval and Treat   Medical Diagnosis from Referral:   F44.4 (ICD-10-CM) - Functional neurological symptom disorder with abnormal movement   R53.1,Z74.09,R68.89 (ICD-10-CM) - Decreased strength, endurance, and mobility   R53.83 (ICD-10-CM) - Fatigue, unspecified type      Evaluation Date: 1/10/2025  Authorization Period Expiration: 12/23/25  Plan of Care Expiration: 04/04/25  Visit # / Visits authorized: 15/15   FOTO: 02/ 03       Precautions: Standard     Time In: 1:35 pm  Time Out: 2:30 pm  Total Billable Time: 55 mins    SUBJECTIVE     Stephan reports continue R knee discomfort noting sx /c stepping.    Patient plans not having to stay at work so late but unable to discuss what she plans for that timeframe.    Patient report during longer drive home returned to listening to music and podcast about shows she watches.    Patient report not having accessed the resistance machines at her home.         Patient's FRP goals:  "go back to normalcy" dec random movements, improved speech pattern     From past sessions:   FRP seems to bring on more frustration.   Work is very stressful.  Self motivation is difficult      Current 2/10  Location(s): facial  and UE symptoms, effected speech    OBJECTIVE     Objective Measures updated at progress report unless specified.       Limitation/Restriction for FOTO ANKLE Survey     Therapist reviewed FOTO scores for Stephan Wallace on 1/10/2025.   FOTO documents entered into [facility] - see Media section.     INTAKE Score 1/10/2025: 63% (53/80 LEFS)   03/07/25: 65% (58/80 LEFS)      Predicted Score: 70%     MDC = 9 points             Treatment "       Stephan received the Individualized Treatments listed below:     Stephan participated in dynamic functional therapeutic activities to improve functional performance for 30 minutes, including:     Self Prescribed Stretches   LTR x 10  Open Books x 10 each side   Prone press ups x 10     (After Fitter Board) Tailgators  2# ankle weight for sx self management    (After patient defer LE exercises on BATCA)  Standing calf heel raises x 20, heel eccentrics from side of TM  Standing alternating hip ext, hip AB x 15 each side  no stopping between directions        NP:   Functional Activity Endurance   DATE ACTIVITY DURATION DETAILS OTHER/PRE   2/21/25 SciFit 12 min Level 1 3/10   02/26/25 SciFit 19 min Level 1 4/10   02/28/25 SciFit 10:30 min Level 1  3/10   02/28/25 Hallway ambulation 5 min  4/10   3/5/25 Sci Fit 20 min Level 1, no UE's 4/10   3/12/25 TM 12 min  5/10   3/14/25 Stationary bike 12 min Level 2 5/10 (knee pain)   3/18/25 Stationary bike 8 min Level 1 4/10   3/18/2025 Up/down   6 flight of stairs 4/10   TUG and 6MWT                  Mindfulness: Body scan, Progressive Muscle Relaxation  Mindfulness-based activity involving deep breathing, mindful awareness of the body and headspace  Used to activate parasympathetic nervous system to decrease autonomic threat perception and thus reduce central sensitivity to pain  Patient demonstrate safe performance     Somatic Tracking:  Mindfulness-based, guided imagery to reduce fear-avoidance and catastrophizing around physical symptoms in body  Guided patient through attending to negative (painful), neutral, and positive sensations in the body  Used imagery and metaphors to reduce fear and hyperfixation on symptoms  Used tennis ball in hands and breathing as grounding sensations to reduce fear upon examining sx in body    Functional Game Activity  Used Ball Toss activity to improve reaching, stepping, reaction to external stimuli, and to train dynamic balance  Pt  required to bend, reach,  objects from floor, and throw  Improved accuracy and functional mechanics over course of activity  Cues provided for postural awareness          Stephan participated in neuromuscular re-education activities to improve: Balance, Coordination, Kinesthetic, Sense, Proprioception, and Posture for 30 minutes. The following activities were included:      Fitter board - high setting, 20 tilt bouts   Fwd/Bwd    Trial 1-  2 hands on fade to finger tip, EO, mod timing /c 30 bpm     Trial 2 - 2 hands on, EC, 30 bpm    Lat    Trial 1- 1 hand on, poor control 30 bpm, patient report inc L knee discomfort        Peripheral muscle strengthening which included 1-2 sets of 10-20 repetitions at a slow, controlled 5-7 second per rep pace focused on strengthening supporting musculature for improved body mechanics & functional mobility.  Patient & therapist focused on proper form & speed during treatment to ensure optimal strengthening of each targeted muscle group & neuromuscular re-education. Patients are instructed to keep sets/reps with proper load to stay at 3-5 out of 10 on the provided modified Panchito exertion scale.    BATCA Machine Exercises Weight (lbs) Sets Repetitions Panchito Exertion Scale Rating   Leg Extension        Hamstring Curls       Chest Press 22.5  20 4   Pec Fly 20  15 4   Lat Pull Down 25 1 20 4   Mid Row 35 1 20 4   Bicep Bar Curls       Standing Tricep Extension       Single Leg Press  R/L 30 Patient defer performance 20      NP:   Balance:  Narrow stance on Airex pad  2x20 tosses with beach ball  Challenged dynamic stability and provided mental distraction to reduce functional neurological sx      Stephan received therapeutic exercises to develop strength, endurance, ROM, flexibility, posture, and core stabilization for 00 minutes including:    Prone knee stretch c/ strap 30 sec x3  DARLING x1 min  Prone press ups x10   Supine SAQ L over bolster x15  (PRE 3/10)      Patient Education  "and Home Exercises     Home Exercises Provided and Patient Education Provided     Education provided:   - FRP Educational Topics: Physical & Psychological Pain Cycles, Diaphragmatic Breathing, Activity Pacing for Pain & Debility , Resisted Exercise Basics, Mindfulness Resources, and Posture & Body Mechanics      Written Home Exercises Provided: Patient instructed to cont prior HEP. Exercises were reviewed and Stephan was able to demonstrate them prior to the end of the session.  Stephan demonstrated good  understanding of the education provided. See EMR under Patient Instructions for information provided during therapy sessions    ASSESSMENT     Stephan participated fair in PT session today.  Although patient demonstrate Ind /c initial stretching, she maintains preference to not join group for full stretch indicating continue dec social outcome forming.   Also, when patient presented /c 2 choices of next exercise to perform, patient consistently respond "whatever you want" indicating min carry over of empowered activities and dec Ind /c exercise programming in general.  However, patient able to demonstrate appropriate rep pacing /c UE resistance exercise performance today thereby meeting initial associated goal.  During resistance training patient decline to perform LE resistance exercises noting concern for knee and thereby demonstrating dec insight into abilities and continued self limiting behavior.  Considering patient ascend/descend multi flight stairs last visit, patient educated on use of Panchito Scale to prevent "boom/bust".  Also, to demonstrate "adjust not avoid" tx transition to joint above and below.  Recommend 1 month f/u assess carry over of these FRP tenets to lead toward exercise maintenance.  However, patient subjective report indicate minimal preparation for time after cohort including not accessing at home gym.  Patient encouraged to consider using time not coming to FRP cohort for continued exercise " performance in any available setting.   Patient to go on hold for minimum of 3 weeks to attempt independence w/ HEP & PT activities, then return for reassessment.    Stephan Is progressing poor to fair towards her goals.   Pt prognosis is Good.     Pt will continue to benefit from skilled outpatient physical therapy to address the deficits listed in the problem list box on initial evaluation, provide pt/family education and to maximize pt's level of independence in the home and community environment.     Pt's spiritual, cultural and educational needs considered and pt agreeable to plan of care and goals.     Anticipated Barriers for therapy: chronic nature of issues, psychosocial factors, central sensitization    GOALS: Pt is in agreement with the following goals:  Goal Progress Towards Goal   SHORT Term: In 3 weeks, pt will:     Verbalize & demonstrate good understanding of resisted training with 3-1-3 second rep for [id number] contraction to encourage full muscle recruitment for strength & endurance. MET 03/21/25   Verbalize & demonstrate good understanding of home stretch routine to improve AROM, help decrease pain & improve mobility. Progressing 03/07/25   Demonstrate proper supine or seated diaphragmatic breathing with decreased chest excursion & emphasis on full abdominal excursion & increased expiration time to promote muscle relaxation & increased parasympathetic activity to improve patient tolerance to activities. Progressing 03/07/25   Verbalize good understanding of importance of proper posture in resisted exercise, functional activities & ADL's in order to help reduce postural strain, promote proper breathing. Progressing 03/07/25   Demonstrate good understanding of full body resisted exercises w/ use of pictures &/or verbal cues to promote independence w/ exercise at the end of the program. Progressing 03/07/25   Verbalize plan for continuing resisted exercises w/ specific location  "(i.e. commercial gym, home, community fitness center)  to incorporate all major muscle groups to maintain & progress therapeutic functional improvements. Progressing 03/07/25   Verbalize difference between  "hurt vs harm"  to demonstrate understanding of fear avoidance in pain cycle.  Progressing 03/07/25         Midterm: In 8 weeks, pt will:     Verbalize good understanding of activities planning/scheduling (stretching, mindfulness, resisted training, & cardio) prescribed by PT/OT following the end of the program to sustain & progress functional improvements. Progressing 03/07/25   Perform selected resisted training w/ minimal to no cuing in therapy session to be independent w/ resisted strengthening. Progressing 03/07/25   Demonstrate improved symptom self-management w/ posture/positioning and mechanical movements and/or implementation of appropriate modalities throughout a typical day.  Progressing 03/07/25   Demonstrate independence w/ home stretch routine to improve AROM, help decrease pain & improve mobility. Progressing 03/07/25         Long Term: In 12 weeks, pt will:     Perform selected cardio & resisted training independently to continue safe regular performance of daily exercise. Progressing 03/07/25   Improve  6 MWT to 395 meters or greater to improve gait speed and mobility. Progressing 03/07/25   Perform LLE single leg stance 20 seconds or greater bilaterally to improve safety and balance in ADLs. Progressing 03/07/25   Demonstrate Timed Up & Go (with appropriate assistive device, if necessary) of 8 seconds or less to decrease fall risk and improve functional mobility. Progressing 03/07/25   Score 70% or more on ANKLE FOTO to indicate significant functional improvements in impaired functions secondary to pain. Progressing 03/07/25                PLAN     Patient to go on hold for minimum of 3 weeks to attempt independence w/ HEP & PT activities, then return for reassessment.          Bebo Slaughter, " PT

## 2025-03-22 ENCOUNTER — PATIENT MESSAGE (OUTPATIENT)
Dept: SPORTS MEDICINE | Facility: CLINIC | Age: 35
End: 2025-03-22
Payer: COMMERCIAL

## 2025-03-27 NOTE — PROGRESS NOTES
I have met with the above student and agree with the assessment and note written. I was present for this encounter. Any changes have been made and authorized by me. Please see below for outcome measures:    Stephan Wallace Cohort 85 Day 1 Day 16 % change   LINDA Depression-17  Anxiety-9  Stress-13 Depression- 15  Anxiety- 9  Stress- 12 13% improvement    8% improvement   VAS  Pain today-3  Pain in the past week-8 Pain today- 3  Pain in the past week- 5   46% improvement   Pain Catastrophizing Scale 44 36 20% decrease    Sleep Quality Instrument  17 12 34% improvement    Pain Disability Index 20 34    Fear Avoidance Beliefs Fear of Physical pain -13  Fear of Pain caused by work/chores- 17  Total fear of pain- 33 Fear of Physical pain- 12  Fear of Pain caused by work/chores- 22  Total fear of pain- 40 8% decrease     Indira Pain questionnaire 18 14 25% decrease   Low Back Disability  18% 16% 12% decrease   Central Sensitivity Index 72 61 17% decrease   ACE score 2 N/A      Zeynep Reyes, [facility] 03/27/2025 3:52 PM

## 2025-04-01 ENCOUNTER — PATIENT MESSAGE (OUTPATIENT)
Dept: NEUROLOGY | Facility: CLINIC | Age: 35
End: 2025-04-01
Payer: COMMERCIAL

## 2025-04-03 NOTE — PROGRESS NOTES
Established Patient     The patient location is: [place]  The chief complaint leading to consultation is: ha f/up    Visit type: audiovisual    Face to Face time with patient: 17 min  23 minutes of total time spent on the encounter, which includes face to face time and non-face to face time preparing to see the patient (eg, review of tests), Obtaining and/or reviewing separately obtained history, Documenting clinical information in the electronic or other health record, Independently interpreting results (not separately reported) and communicating results to the patient/family/caregiver, or Care coordination (not separately reported).         Each patient to whom he or she provides medical services by telemedicine is:  (1) informed of the relationship between the physician and patient and the respective role of any other health care provider with respect to management of the patient; and (2) notified that he or she may decline to receive medical services by telemedicine and may withdraw from such care at any time.    Notes:     SUBJECTIVE:  Patient ID: Stephan Wallace   Chief Complaint: Headache    History of Present Illness:  Stephan Wallace is a 34 y.o. female with migraines, depression, anxiety, functional neurological movements, who presents to [facility] for follow-up of headaches.       04/04/2025 - Interval History:  Ha's have worsened in the last month gradually ramping up to approximately 4-6 ha days per week. She has been taking imitrex which helps, but only receives so many tablets per month. She was unable to receive ubrelvy 50mg prn from pharamcy and is unsure why. Advised pt to reach out to pharmacy to inquire further. She was able to start quilipta and found it helpful, but had to d/c sometime after last visit due to it no longer being covered by insurance. She is amenable to switching back to emgality as thi shelped in the past. She has also been suffering w/ an intractable HA since Sunday. Will send  medrol dose pack to break current cycle. Discussed disability paperwork and advised fmla/intermittent leave for ha's.   Plan: medrol dose pack to break current cycle, start emgality, continue limited imitrex prn, try ubrelvy 50mg prn if approved continue to track ha's, rtc 6 mo or sooner if needed    11/18/2024 - Interval History:  Ha's have improved. Per HA diary, are now occurring 8/30 days per month. Has been unable to obtain ubrelvy 2/2 to [place]. Is taking imitrex as needed.   Plan: continue quilipta 60mg/d, try ubrelvy 50mg prn, continue imitrex tabs prn, continue to track ha's, rtc 6-12 mo or sooner if needed    08/02/2024 - Interval History:  Ha's have improved since starting quilipta, per HA diary they are occurring 13-16/30 days per month. Insurance requires scripts sent to [facility]. Was unable to receive the Imitrex NS.   Ubrelvy 50mg - cannot recall trying it  Imitrex 100mg- helps  Quilipta 30mg- helps  Plan: increase quilipta 60mg/d, try ubrelvy 50mg prn, continue imitrex tabs prn, continue to track ha's, rtc 2-3 mo or sooner if needed    4/12/24 Telephone Encounter:   Aimovig is complete plan/benefit exclusion. Qulipta is alternative.   Called pt and informed her, she is willing to try qulipta.   Started quilipta 30mg.     4/10/24 Telephone Encounter:   Called pt.   Insurance will not cover nurtec unless she has tried aimovig and ubrelvy.   Insurance will not cover ubrelvy unless she has tried 2 triptans. Has tried imitrex thus far.   Pt has been informed. She would like to try aimovig at this time.     04/05/2024 - Interval History:  Pt not currently tracking. Is unsure of frequency. Did have 2 HA's last week lasting 2 days each. Had 6 HA's in the last month lasting days each. Discussed options w/ pt. Including preventatives and abortives and previous insurance responses (nurtec not approved until tried aimovig and ubrelvy first). Pt would like to try ubrelvy and track ha's.   Plan: try ubrelvy 50mg prn,  continue imitrex tabs prn, track ha's, rtc 2-3 mo or sooner if needed    12/11/2023 - Interval History:  Pt has not been taking emgality for many months. Is unsure when she stopped taking it. Of note, they were very painful for her, she would like to avoid injectable options if possible. Ha's are occurring approximately 8/30 days per month. Pt is tracking, will send diary via [employer].   Nurtec - hasn't tried yet  Imitrex NS - hasn't tried yet  Imitrex tabs - help better than anything else taken so far  Plan: try nurtec preventatively, continue imitrex tabs prn, track ha's, rtc 2-3 mo or sooner if needed    11/13/2023 - Interval History:  Pt's ha's worsened in the last month, denies any changes at the time. Ha's occurring 17/30 days this month. Imitrex is helping, but is temporary.   Discussed otpions. Would like to try nurtec instead of emgality 2/2 painful when injecting.   Plan: try nurtec preventatively (if insurance denies, is amenable to injectable cgrp inhibitor), continue imitrex tabs prn, will try imitrex NS as 2nd line, rtc 2-3 mo or sooner if needed    06/20/2023 - Interval History:  Pt completed 3 rounds of emgality after last visit which helped greatly. She stopped taking it approximately 3 months ago. Ha's have been well controlled since last visit, occurring 1-2 times a month. Resolvign w/ imitrex.   Continues to follow w/ neurology regarding abnormal movements.   Plan: continue imitrex prn, continue neuro recs, rtc prn    Recommendations made at last Office Visit on 1/25/23:  - Discussed symptoms appear to be consistent with migraines, discussed treatment options and patient agreed with the following plan:  - ppx - start emgality  - abortive - continue imitrex 100mg tab prn  - anxiety/depression - continue cymbalta 60mg/d, mgmt per psychiatrist  - risks, benefits, and potential side effects of emgality, imitrex discussed   - alternative treatment options offered   - importance of healthy diet, regular  exercise and sleep hygiene in the treatment of headaches    - Start tracking headaches via Migraine Buddy ifrah on phone   - RTC in 4 mo or sooner if needed     Treatments Tried:  Emgality - completed 3 rounds, helped,  painful  Tpx - face tingling  Cymbalta 60mg/d - helps depression/anxiety  Quilipta 60mg - helps, no longer covered by insurance  Imitrex 100mg - helps  Ubrelvy 50mg - trouble w/ insurance coverage  Excedrin  Advil     Current Medications:    Current Outpatient Medications:     AUVELITY  mg TbIE, Take  mg by mouth 2 (two) times a day., Disp: , Rfl:     diazePAM (VALIUM) 5 MG tablet, Take 5 mg by mouth every 6 (six) hours as needed for Anxiety., Disp: , Rfl:     drospirenone-ethinyl estradioL (FRANCESCA) 3-0.03 mg per tablet, Take 1 tablet by mouth once daily., Disp: 30 tablet, Rfl: 11    galcanezumab-gnlm 120 mg/mL PnIj, Inject 1 mL (120 mg total) into the skin every 28 days. Begin 28 days after loading dose., Disp: 1 mL, Rfl: 11    galcanezumab-gnlm 120 mg/mL PnIj, Inject 2 mLs (240 mg total) into the skin once. for 1 dose, Disp: 2 mL, Rfl: 0    methylPREDNISolone (MEDROL DOSEPACK) 4 mg tablet, use as directed, Disp: 1 each, Rfl: 0    naloxone (NARCAN) 4 mg/actuation Spry, 4mg by nasal route as needed for opioid overdose; may repeat every 2-3 minutes in alternating nostrils until medical help arrives. Call 911, Disp: 1 each, Rfl: 11    sumatriptan (IMITREX) 100 MG tablet, Take 1 tab PO at onset of migraine, can repeat in 2 hrs if needed.  No more than twice per day or 3 days/wk., Disp: 18 tablet, Rfl: 5    ubrogepant (UBRELVY) 50 mg tablet, Take 1 tablet by mouth at the onset of a headache. May repeat based on response and tolerability after more than 2 hours if needed. Do not take more than 200mg in a 24 hour span., Disp: 16 tablet, Rfl: 5    Review of Systems - as per HPI, otherwise a balanced 10 systems review is negative.    OBJECTIVE:  Vitals:  There were no vitals taken for this visit.      Physical Exam:  Constitutional: she appears well-developed and well-nourished. she is well groomed. NAD   HENT:    Head: Normocephalic and atraumatic  Eyes: Conjunctivae and EOM are normal  Musculoskeletal: Normal range of motion. No joint stiffness.   Skin: Skin is warm and dry.  Psychiatric: Mood and affect are normal    Neuro: Patient is alert and oriented to person, place, and time. Language is intact and fluent. Speech is clear and fluent. Recent and remote memory are intact.  Normal attention and concentration.  Facial movement is symmetric. Moves all 4 extremities against gravity. Gait and station normal.  Cranial Nerves II through XII without focal deficit.     Review of Data:   Notes from neuro reviewed   Labs:  No visits with results within 3 Month(s) from this visit.   Latest known visit with results is:   Admission on 11/30/2024, Discharged on 11/30/2024   Component Date Value Ref Range Status    POC Preg Test, Ur 11/30/2024 Negative  Negative Final     Acceptable 11/30/2024 Yes   Final     Imaging:  Results for orders placed or performed during the hospital encounter of 07/06/22   CT Head Without Contrast    Narrative    EXAMINATION:  CT HEAD WITHOUT CONTRAST    CLINICAL HISTORY:  Headache, sudden, severe;    TECHNIQUE:  Low dose axial images were obtained through the head.  Coronal and sagittal reformations were also performed. Contrast was not administered.    COMPARISON:  None.    FINDINGS:  The subcutaneous tissues are unremarkable.  The bony calvarium is intact.  The paranasal sinuses are unremarkable.  The mastoid air cells are clear.  The orbits and intraorbital contents are unremarkable.    The craniocervical junction is intact.  The midline structures are unremarkable.  There are no extra-axial fluid collections.  There is no evidence of intracranial hemorrhage.  The ventricles and sulci are within normal limits.  The cisterns are unremarkable.  The gray-white differentiation  is maintained.  There is no dense vessel sign.  There is no evidence of mass effect.      Impression    No acute process.  Follow-up, as clinically warranted.      Electronically signed by: Adam De MD  Date:    07/06/2022  Time:    18:28     Note: I have independently reviewed any/all imaging/labs/tests and agree with the report (s) as documented.  Any discrepancies will be as noted/demarcated by free text.  JOSUÉ RODRIGUEZ 4/4/2025    ASSESSMENT:  1. Intractable migraine with aura with status migrainosus        PLAN:  - Discussed symptoms appear to be consistent with migraines, discussed treatment options and patient agreed with the following plan:  - medrol dose pack to break current cycle  - ppx - restart emgality  - abortive - continue imitrex 100mg tab prn, try ubrelvy 50mg prn  - anxiety/depression - mgmt per psychiatrist  - abnormal movements - mgmt per neuro  - continue to track ha's  - Discussed goals of therapy are to decrease the frequency, intensity, and duration of headaches  - RTC 6 mo     Orders Placed This Encounter    galcanezumab-gnlm 120 mg/mL PnIj    galcanezumab-gnlm 120 mg/mL PnIj    methylPREDNISolone (MEDROL DOSEPACK) 4 mg tablet    sumatriptan (IMITREX) 100 MG tablet       Questions and concerns were sought and answered to the patient's stated verbal satisfaction.  The patient verbalizes understanding and agreement with the above stated treatment plan.     CC: Teena Kothari MD Sarena Patel, PA-C  [facility] [place]   [phone]    Dr. Doyle was available during today's encounter.       Visit today included increased complexity associated with the care of the episodic problem migraines addressed and managing the longitudinal care of the patient due to the serious and/or complex managed problem(s) migraines.

## 2025-04-04 ENCOUNTER — OFFICE VISIT (OUTPATIENT)
Facility: CLINIC | Age: 35
End: 2025-04-04
Payer: COMMERCIAL

## 2025-04-04 DIAGNOSIS — G43.111 INTRACTABLE MIGRAINE WITH AURA WITH STATUS MIGRAINOSUS: Primary | ICD-10-CM

## 2025-04-04 RX ORDER — METHYLPREDNISOLONE 4 MG/1
TABLET ORAL
Qty: 1 EACH | Refills: 0 | Status: SHIPPED | OUTPATIENT
Start: 2025-04-04

## 2025-04-04 RX ORDER — SUMATRIPTAN SUCCINATE 100 MG/1
TABLET ORAL
Qty: 18 TABLET | Refills: 5 | Status: SHIPPED | OUTPATIENT
Start: 2025-04-04 | End: 2026-04-04

## 2025-04-05 ENCOUNTER — PATIENT MESSAGE (OUTPATIENT)
Facility: CLINIC | Age: 35
End: 2025-04-05
Payer: COMMERCIAL

## 2025-04-07 ENCOUNTER — OFFICE VISIT (OUTPATIENT)
Facility: CLINIC | Age: 35
End: 2025-04-07
Payer: COMMERCIAL

## 2025-04-07 ENCOUNTER — HOSPITAL ENCOUNTER (OUTPATIENT)
Dept: RADIOLOGY | Facility: HOSPITAL | Age: 35
Discharge: HOME | End: 2025-04-07
Attending: ORTHOPAEDIC SURGERY
Payer: COMMERCIAL

## 2025-04-07 ENCOUNTER — PATIENT MESSAGE (OUTPATIENT)
Facility: CLINIC | Age: 35
End: 2025-04-07

## 2025-04-07 ENCOUNTER — OFFICE VISIT (OUTPATIENT)
Dept: ORTHOPEDICS | Facility: CLINIC | Age: 35
End: 2025-04-07
Payer: COMMERCIAL

## 2025-04-07 VITALS
WEIGHT: 282.75 LBS | BODY MASS INDEX: 45.44 KG/M2 | HEIGHT: 66 IN | DIASTOLIC BLOOD PRESSURE: 86 MMHG | SYSTOLIC BLOOD PRESSURE: 123 MMHG | HEART RATE: 88 BPM

## 2025-04-07 VITALS — HEIGHT: 66 IN | WEIGHT: 282.88 LBS | BODY MASS INDEX: 45.46 KG/M2

## 2025-04-07 DIAGNOSIS — R52 PAIN: ICD-10-CM

## 2025-04-07 DIAGNOSIS — M76.62 LEFT ACHILLES TENDINITIS: Primary | ICD-10-CM

## 2025-04-07 DIAGNOSIS — G25.9 FUNCTIONAL MOVEMENT DISORDER: Primary | ICD-10-CM

## 2025-04-07 DIAGNOSIS — M21.42 PES PLANUS OF LEFT FOOT: ICD-10-CM

## 2025-04-07 PROCEDURE — 99215 OFFICE O/P EST HI 40 MIN: CPT | Mod: S$GLB,,, | Performed by: PSYCHIATRY & NEUROLOGY

## 2025-04-07 PROCEDURE — 99203 OFFICE O/P NEW LOW 30 MIN: CPT | Mod: S$GLB,,, | Performed by: ORTHOPAEDIC SURGERY

## 2025-04-07 PROCEDURE — 99999 PR PBB SHADOW E&M-EST. PATIENT-LVL III: CPT | Mod: PBBFAC,,, | Performed by: PSYCHIATRY & NEUROLOGY

## 2025-04-07 PROCEDURE — 99999 PR PBB SHADOW E&M-EST. PATIENT-LVL III: CPT | Mod: PBBFAC,,, | Performed by: ORTHOPAEDIC SURGERY

## 2025-04-07 PROCEDURE — 73630 X-RAY EXAM OF FOOT: CPT | Mod: 26,LT,, | Performed by: RADIOLOGY

## 2025-04-07 PROCEDURE — 73610 X-RAY EXAM OF ANKLE: CPT | Mod: 26,LT,, | Performed by: RADIOLOGY

## 2025-04-07 PROCEDURE — 73610 X-RAY EXAM OF ANKLE: CPT | Mod: TC,LT

## 2025-04-07 PROCEDURE — 73630 X-RAY EXAM OF FOOT: CPT | Mod: TC,LT

## 2025-04-07 NOTE — PROGRESS NOTES
[facility] - NEUROLOGICAL MOVEMENT DISORDERS 8TH FLOOR  [facility], [place]    Date: 4/7/25  Patient Name: Stephan Wallace   MRN: [id number]   PCP: Teena Kothari  Referring Provider: No ref. provider found    Assessment:   Stephan Wallace is a 34 y.o. female presenting for FND follow up. Working towards goal with functional restoration clinic. Recently started on Emgality. Has not started it yet but it got approved. She thinks that there is definitely an improvement. She is still having significant symptoms but she is happy. I anticipate more improvement as she works with the clinic, and as her migraines and mental health continue to improve. We will plan a follow up in 3 months. She verbalized understanding and agreed with the plan.     Plan:     - Continue functional restoration program.   - Referral to [facility] for therapy   - Follow up with psychiatry   - Follow up with HARRISON Gudino for migraines   - Follow up in 3 months     Problem List Items Addressed This Visit          Neuro    Functional movement disorder - Primary     Sergey Trejo MD    Subjective:   Patient seen in consultation at the request of No ref. provider found for the evaluation of FND follow up. A copy of this note will be sent to the referring physician.      Interval history 4/7/25:   Ms. Stephan Wallace is a 34 y.o. female presenting for FND follow up. Since last seen, she has been struggling to keep up with functional restoration clinic, and with her migraines. Recently saw HARRISON Gudino and she was started on Emgality. She thinks that there is definitely an improvement. She is still having significant symptoms but she is happy. She is aware that her mental health issues are most likely related to her symptoms. She is about to file for intermittent absence of leave. This will help her work more frequently with the functional restoration clinic.     Per previous by myself on 1/27/25:    Assessment:    Stephan Wallace is a 34 y.o. female presenting for FND follow up. Had a significant conversation about how she is basically untreated. She has a better and positive attitude about her disease. We will focus on the functional restoration program + referral to neuropsychology (She has been doing counseling but her counselor states she has no experience on FND). Provided reassuring and went through the plan one more time. Continue follow up with psychiatry. Will plan a follow up in 3 months.      Plan:      - Continue/start functional restoration program. Might order speech therapy in the future if no clear improvement   - Referral to neuropsychology   - Follow up with psychiatry   - Follow up in 3 months     PAST MEDICAL HISTORY:  Past Medical History:   Diagnosis Date    Anxiety     Depression     Dissociative and conversion disorder, unspecified     Fibroids        PAST SURGICAL HISTORY:  Past Surgical History:   Procedure Laterality Date    DENTAL IMPLANT      DILATION AND CURETTAGE OF UTERUS N/A 8/20/2024    Procedure: DILATION AND CURETTAGE, UTERUS;  Surgeon: Molly Vega MD;  Location: [facility] OR;  Service: OB/GYN;  Laterality: N/A;  RN PREOP 8/15/2024 -----TYPE & SCREEN---- UPT -- ON ARRIVAL  BMI--47.79    EXPLORATORY LAPAROTOMY WITH UTERINE MYOMECTOMY N/A 8/20/2024    Procedure: LAPAROTOMY, EXPLORATORY, WITH UTERINE MYOMECTOMY;  Surgeon: Molly Vega MD;  Location: [facility] OR;  Service: OB/GYN;  Laterality: N/A;       CURRENT MEDS:  Current Medications[1]    ALLERGIES:  Review of patient's allergies indicates:  No Known Allergies    FAMILY HISTORY:  Family History   Problem Relation Name Age of Onset    No Known Problems Mother      No Known Problems Father      No Known Problems Sister      No Known Problems Brother         SOCIAL HISTORY:  Social History[2]    Review of Systems:  12 system review of systems is negative except for the symptoms mentioned in HPI.      Objective:     Vitals:    04/07/25  "0827   BP: 123/86   BP Location: Right arm   Patient Position: Sitting   Pulse: 88   Weight: 128.3 kg (282 lb 11.8 oz)   Height: 5' 6" (1.676 m)     General: NAD, well nourished   Eyes: no tearing, discharge, no erythema   ENT: moist mucous membranes of the oral cavity, nares patent    Neck: Supple, full range of motion  Cardiovascular: Warm and well perfused, pulses equal and symmetrical  Lungs: Normal work of breathing, normal chest wall excursions  Skin: No rash, lesions, or breakdown on exposed skin  Psychiatry: Mood and affect are appropriate   Abdomen: soft, non tender, non distended  Extremeties: No cyanosis, clubbing or edema.    Neurological   MENTAL STATUS: Alert and oriented to person, place, and time. Attention and concentration within normal limits. Speech without dysarthria, able to name and repeat without difficulty. Recent and remote memory within normal limits   CRANIAL NERVES: Visual fields intact. PERRL. EOMI. Facial sensation intact. Face symmetrical. Hearing grossly intact. Full shoulder shrug bilaterally. Tongue protrudes midline   SENSORY: Sensation is intact to pin throughout.  Joint position perception intact. Negative Romberg.   MOTOR: Normal bulk and tone. No pronator drift.  5/5 deltoid, biceps, triceps, interosseous, hand  bilaterally. 5/5 iliopsoas, knee extension/flexion, foot dorsi/plantarflexion bilaterally.    REFLEXES: Symmetric and 2+ throughout. Toes down going bilaterally.   CEREBELLAR/COORDINATION/GAIT: Gait steady with normal arm swing and stride length.  Heel to shin intact. Finger to nose intact. Normal rapid alternating movements.     Movement exam:  At rest, forceful bilateral blinking movements that alternate between this and eye brown vertical movements. These movements were distractible, there was entrainment present while finger tapping, and I was able to separate the blinking movements from the eye brow movements by distraction and by having her close her eyes and " perform other tasks (better than previous appointments)    I spent a total of 40 minutes on the day of the visit.   This includes face to face time and non-face to face time preparing to see the patient (eg, review of tests), obtaining and/or reviewing separately obtained history, documenting clinical information in the electronic or other health record, independently interpreting results and communicating results to the patient/family/caregiver, or care coordinator.      Sergey Trejo MD  Neurology           [1]   Current Outpatient Medications   Medication Sig Dispense Refill    AUVELITY  mg TbIE Take  mg by mouth 2 (two) times a day.      diazePAM (VALIUM) 5 MG tablet Take 5 mg by mouth every 6 (six) hours as needed for Anxiety.      drospirenone-ethinyl estradioL (FRANCESCA) 3-0.03 mg per tablet Take 1 tablet by mouth once daily. 30 tablet 11    galcanezumab-gnlm 120 mg/mL PnIj Inject 1 mL (120 mg total) into the skin every 28 days. Begin 28 days after loading dose. 1 mL 11    methylPREDNISolone (MEDROL DOSEPACK) 4 mg tablet use as directed 1 each 0    naloxone (NARCAN) 4 mg/actuation Spry 4mg by nasal route as needed for opioid overdose; may repeat every 2-3 minutes in alternating nostrils until medical help arrives. Call 911 1 each 11    sumatriptan (IMITREX) 100 MG tablet Take 1 tab PO at onset of migraine, can repeat in 2 hrs if needed.  No more than twice per day or 3 days/wk. 18 tablet 5    ubrogepant (UBRELVY) 50 mg tablet Take 1 tablet by mouth at the onset of a headache. May repeat based on response and tolerability after more than 2 hours if needed. Do not take more than 200mg in a 24 hour span. 16 tablet 5     No current facility-administered medications for this visit.   [2]   Social History  Tobacco Use    Smoking status: Never     Passive exposure: Never    Smokeless tobacco: Never   Substance Use Topics    Alcohol use: Not Currently     Alcohol/week: 7.0 standard drinks of alcohol      Types: 4 Glasses of wine, 3 Drinks containing 0.5 oz of alcohol per week    Drug use: Not Currently     Frequency: 4.0 times per week     Types: Marijuana

## 2025-04-07 NOTE — PROGRESS NOTES
DATE: 4/7/2025  PATIENT: Stephan Wallace    CHIEF COMPLAINT: Left achilles tendon pain and midfoot pain     HISTORY:  Stephan Wallace is a 34 y.o. female w/ PMH of anxiety, migraine headaches here for evaluation of of left achilles pain and left dorsal midfoot pain.  Patient states she was completing rehab sessions last year for knee tendonitis, when she first began having achilles pain. She states her therapy team attributed it to overcompensating for the knee issues. She localizes the pain to the distal medial aspect of the achilles tendon, proximal to its insertion site. She says that the midfoot pain began 2 months ago, with no inciting event. Both issues cause pain that is constant throughout the day. She describes the pain as dull and achy, and states it is worse with ambulation. There is no associated numbness and tingling. Prior treatments have included heat, ice, and OTC NSAIDs. She is also currently on day 3 of a steroid taper which she says has helped significantly with the discomfort.         PAST MEDICAL/SURGICAL HISTORY:  Past Medical History:   Diagnosis Date    Anxiety     Depression     Dissociative and conversion disorder, unspecified     Fibroids      Past Surgical History:   Procedure Laterality Date    DENTAL IMPLANT      DILATION AND CURETTAGE OF UTERUS N/A 8/20/2024    Procedure: DILATION AND CURETTAGE, UTERUS;  Surgeon: Molly Vega MD;  Location: [facility] OR;  Service: OB/GYN;  Laterality: N/A;  RN PREOP 8/15/2024 -----TYPE & SCREEN---- UPT -- ON ARRIVAL  BMI--47.79    EXPLORATORY LAPAROTOMY WITH UTERINE MYOMECTOMY N/A 8/20/2024    Procedure: LAPAROTOMY, EXPLORATORY, WITH UTERINE MYOMECTOMY;  Surgeon: Molly Vega MD;  Location: [facility] OR;  Service: OB/GYN;  Laterality: N/A;       Current Medications: Current Medications[1]    Social History: Social History[2]    REVIEW OF SYSTEMS:  Constitution: Negative. Negative for chills, fever and night sweats.   Cardiovascular: Negative for  "chest pain and syncope.   Respiratory: Negative for cough and shortness of breath.   Gastrointestinal: See HPI. Negative for nausea/vomiting. Negative for abdominal pain.  Genitourinary: See HPI. Negative for discoloration or dysuria.  Skin: Negative for dry skin, itching and rash.   Hematologic/Lymphatic: Negative for bleeding problem. Does not bruise/bleed easily.   Musculoskeletal: see HPI.  Neurological: See HPI. No seizures.   Endocrine: Negative for polydipsia, polyphagia and polyuria.   Allergic/Immunologic: Negative for hives and persistent infections.    PHYSICAL EXAMINATION:    Ht 5' 6" (1.676 m)   Wt 128.3 kg (282 lb 13.6 oz)   BMI 45.65 kg/m²     General: The patient is a 34 y.o. female in no apparent distress, the patient is oriented to person, place and time.   Psych: Normal mood and affect  HEENT:  NCAT, sclera nonicteric  Lungs:  Respirations are equal and unlabored.  CV:  2+ bilateral upper and lower extremity pulses.  Skin:  Intact throughout.  Musculoskeletal: No pain with the range of motion of the bilateral hips. No trochanteric tenderness to palpation. No pain with range of motion about the bilateral knees.      Left Foot and Ankle Exam    INSPECTION:        Gait:    Normal   Scars:   None   Swelling:  None   Color:   Normal   Atrophy:  None  Heel / Toe Walking: No difficulty    ALIGNMENT:    Hindfoot  Normal    Midfoot: Normal  Forefoot: Normal     Collective Ankle-Hindfoot Alignment    Good - plantigrade (PG), well aligned        TENDERNESS:  LATERAL:      Sinus tarsi:  None    Syndesmosis:  None     ATFL:   None      CFL:   None     Anterolateral gutter: None    Fibula:   None   Peroneal tendons: None     Peroneal tubercle.  None     ANTERIOR:  Anteromedial joint line:  None   Anterolateral joint line:  None  Talonavicular:    None  Anterior tibialis:   None   Extensor tendons:   None     POSTERIOR:  Medial/lateral achilles:  None   Medial/lateral achilles " insertion: None    MEDIAL:      Deltoid:  None     Malleolus:  None     PTT:   None     Navicular:  None            CALCANEUS:  Retrocalcaneal:   None   Medial achilles:   None  Lateral achilles:   None  Calcaneal tuberosity:   None    FOOT:    Calcaneal cuboid  None    MT / MT heads:  None   Navicular   None     Medial cord origin PF:  None   Cuneiforms:   None     Web space:   None   Lisfranc    None     Tarsal tunnel:   None   Base of the fifth metatarsal  None    Tinels sign   Negative        RANGE OF MOTION:  RIGHT/ LEFT (* = pain)     Ankle DF/PF:  15/45  15/45         Eversion/Inversion: 15/25 15/25     Midfoot ABD/ADD: 10/10 10/10     First MTP DF/PF:  60/25 60/25               STRENGTH: (affected) (* = pain)  Anterior tibialis: 5/5  Posterior tibialis: 5/5  Gastroc-soleus: 5/5  Peroneals:  5/5  EHL:   5/5  FHL:   5/5        SPECIAL TESTS:   ANKLE INSTABILITY: (*pain)    Anterior drawer:   Normal      (C-W contralateral side)     Inversion:   30°     Eversion  10°            Collective Instability: (Ant-post and varus-valgus)     Stable        PROVOCATIVE TESTING:    Forced DF/ER: No pain at syndesmosis.    Mid-leg squeeze  No pain at syndesmosis.    Forced DF:  No pain anterior joint line.      Forced PF:  No pain posterior ankle.     Forced INV:  No pain laterally.    Forced EV:  No pain medially.    Baes sign: Normal ankle plantar flexion.     Resisted peroneal No subluxation or pain.    1st-2nd MT toggle No pain at Lisfranc.    MT-T torque  No pain at Lisfranc.      NEUROLOGIC TESTING:  All dermatomes foot, ankle and leg have normal sensation light touch  Ankle Reflexes 2+, symmetric   Negative Babinski and No Clonus    VASCULAR:  2+ pulses PT/DT with brisk capillary refill toes.          IMAGING:   Radiographs of the left foot and ankle were ordered and personally reviewed with the patient today.          ASSESSMENT/PLAN:    Stephan was seen today for pain and pain.    Diagnoses and all  orders for this visit:    Left Achilles tendinitis  -     X-Ray Foot Complete Left; Future  -     X-Ray Ankle Complete Left; Future        34 y.o. yo female with PMH of migraines, anxiety presents with a benign physical exam, imaging reveals pes planus but no acute bone or joint pathology, likely has left achilles tendonitis.    Plan: The patient and I had a thorough discussion today.  We discussed the working diagnosis as well as several other potential alternative diagnoses.  Treatment options were discussed, both conservative and surgical.  Conservative treatment options would include things such as activity modifications, a period of rest, tylenol, anti-inflammatory medications, physical therapy, immobilization, corticosteroid injections, and others.     At this time, the patient would like to proceed with:  - WBAT  - Walking boot provided and to be worn for 3-4 weeks  - If no improvement in sxs, will plan to move forward with an MRI   - pain control: OTC NSAIDs/tylenol as needed for pain  - PT recommended following immobilization trial       Return to clinic in 4 weeks to evaluate progress.        JR Monzon MD  Orthopaedic Surgery   Resident Physician, PGY-1  04/07/2025     I have personally taken the history and examined this patient and agree with the residents note as stated above.           [1]   Current Outpatient Medications:     AUVELITY  mg TbIE, Take  mg by mouth 2 (two) times a day., Disp: , Rfl:     diazePAM (VALIUM) 5 MG tablet, Take 5 mg by mouth every 6 (six) hours as needed for Anxiety., Disp: , Rfl:     drospirenone-ethinyl estradioL (FRANCESCA) 3-0.03 mg per tablet, Take 1 tablet by mouth once daily., Disp: 30 tablet, Rfl: 11    galcanezumab-gnlm 120 mg/mL PnIj, Inject 1 mL (120 mg total) into the skin every 28 days. Begin 28 days after loading dose., Disp: 1 mL, Rfl: 11    methylPREDNISolone (MEDROL DOSEPACK) 4 mg tablet, use as directed, Disp: 1 each, Rfl: 0     naloxone (NARCAN) 4 mg/actuation Spry, 4mg by nasal route as needed for opioid overdose; may repeat every 2-3 minutes in alternating nostrils until medical help arrives. Call 911, Disp: 1 each, Rfl: 11    sumatriptan (IMITREX) 100 MG tablet, Take 1 tab PO at onset of migraine, can repeat in 2 hrs if needed.  No more than twice per day or 3 days/wk., Disp: 18 tablet, Rfl: 5    ubrogepant (UBRELVY) 50 mg tablet, Take 1 tablet by mouth at the onset of a headache. May repeat based on response and tolerability after more than 2 hours if needed. Do not take more than 200mg in a 24 hour span., Disp: 16 tablet, Rfl: 5  [2]   Social History  Socioeconomic History    Marital status: Single    Number of children: 0    Highest education level: Associate degree: academic program   Tobacco Use    Smoking status: Never     Passive exposure: Never    Smokeless tobacco: Never   Substance and Sexual Activity    Alcohol use: Not Currently     Alcohol/week: 7.0 standard drinks of alcohol     Types: 4 Glasses of wine, 3 Drinks containing 0.5 oz of alcohol per week    Drug use: Not Currently     Frequency: 4.0 times per week     Types: Marijuana    Sexual activity: Yes     Partners: Male     Birth control/protection: Coitus interruptus, Condom, OCP   Social History Narrative    She is currently working right now.      Social Drivers of Health     Financial Resource Strain: Medium Risk (1/23/2025)    Overall Financial Resource Strain (CARDIA)     Difficulty of Paying Living Expenses: Somewhat hard   Food Insecurity: No Food Insecurity (1/23/2025)    Hunger Vital Sign     Worried About Running Out of Food in the Last Year: Never true     Ran Out of Food in the Last Year: Never true   Transportation Needs: No Transportation Needs (12/11/2023)    PRAPARE - Transportation     Lack of Transportation (Medical): No     Lack of Transportation (Non-Medical): No   Physical Activity: Inactive (1/23/2025)    Exercise Vital Sign     Days of Exercise  per Week: 0 days     Minutes of Exercise per Session: 0 min   Stress: Stress Concern Present (1/23/2025)    [employer] [place] [employer] - Occupational Stress Questionnaire     Feeling of Stress : Very much   Housing Stability: Low Risk  (12/11/2023)    Housing Stability Vital Sign     Unable to Pay for Housing in the Last Year: No     Number of Places Lived in the Last Year: 1     Unstable Housing in the Last Year: No

## 2025-04-07 NOTE — LETTER
April 7, 2025    Stephan Wallace  [address]  [place] [address]             [facility] 5th Fl  [address]LO[address]  [facility] [address]  Phone: [phone] To whom it May concern,      Ms Wallace was evaluated in the Orthopedic Clinic today for left Achilles tendinitis.  I am recommending a four week period of boot immobilization.  Please allow her to work in the fracture boot.  She will be re-evaluated in four weeks.  If there are any questions please notify me.    Sincerely,       Josesito Echevarria MD

## 2025-04-08 ENCOUNTER — TELEPHONE (OUTPATIENT)
Facility: CLINIC | Age: 35
End: 2025-04-08
Payer: COMMERCIAL

## 2025-04-08 NOTE — TELEPHONE ENCOUNTER
Left a detailed VM telling the patient to give us a call back to discuss scheduling an appointment.

## 2025-04-28 ENCOUNTER — HOSPITAL ENCOUNTER (EMERGENCY)
Facility: HOSPITAL | Age: 35
Discharge: HOME | End: 2025-04-28
Attending: EMERGENCY MEDICINE
Payer: COMMERCIAL

## 2025-04-28 ENCOUNTER — DOCUMENTATION ONLY (OUTPATIENT)
Dept: REHABILITATION | Facility: OTHER | Age: 35
End: 2025-04-28
Payer: COMMERCIAL

## 2025-04-28 VITALS
WEIGHT: 282 LBS | RESPIRATION RATE: 16 BRPM | SYSTOLIC BLOOD PRESSURE: 138 MMHG | OXYGEN SATURATION: 97 % | TEMPERATURE: 98 F | HEART RATE: 88 BPM | DIASTOLIC BLOOD PRESSURE: 80 MMHG | BODY MASS INDEX: 45.32 KG/M2 | HEIGHT: 66 IN

## 2025-04-28 DIAGNOSIS — G43.809 OTHER MIGRAINE WITHOUT STATUS MIGRAINOSUS, NOT INTRACTABLE: ICD-10-CM

## 2025-04-28 DIAGNOSIS — S00.01XA ABRASION OF SCALP, INITIAL ENCOUNTER: ICD-10-CM

## 2025-04-28 DIAGNOSIS — S09.90XA MINOR HEAD INJURY, INITIAL ENCOUNTER: Primary | ICD-10-CM

## 2025-04-28 LAB
B-HCG UR QL: NEGATIVE
CTP QC/QA: YES

## 2025-04-28 PROCEDURE — 96375 TX/PRO/DX INJ NEW DRUG ADDON: CPT

## 2025-04-28 PROCEDURE — 96374 THER/PROPH/DIAG INJ IV PUSH: CPT

## 2025-04-28 PROCEDURE — 99284 EMERGENCY DEPT VISIT MOD MDM: CPT | Mod: 25

## 2025-04-28 PROCEDURE — 25000003 PHARM REV CODE 250: Performed by: EMERGENCY MEDICINE

## 2025-04-28 PROCEDURE — 81025 URINE PREGNANCY TEST: CPT | Performed by: EMERGENCY MEDICINE

## 2025-04-28 PROCEDURE — 63600175 PHARM REV CODE 636 W HCPCS: Performed by: EMERGENCY MEDICINE

## 2025-04-28 RX ORDER — BUTALBITAL, ACETAMINOPHEN AND CAFFEINE 50; 325; 40 MG/1; MG/1; MG/1
1 TABLET ORAL EVERY 6 HOURS PRN
Qty: 20 TABLET | Refills: 0 | Status: SHIPPED | OUTPATIENT
Start: 2025-04-28

## 2025-04-28 RX ORDER — PROCHLORPERAZINE EDISYLATE 5 MG/ML
5 INJECTION INTRAMUSCULAR; INTRAVENOUS
Status: COMPLETED | OUTPATIENT
Start: 2025-04-28 | End: 2025-04-28

## 2025-04-28 RX ORDER — KETOROLAC TROMETHAMINE 30 MG/ML
15 INJECTION, SOLUTION INTRAMUSCULAR; INTRAVENOUS
Status: COMPLETED | OUTPATIENT
Start: 2025-04-28 | End: 2025-04-28

## 2025-04-28 RX ORDER — ONDANSETRON 4 MG/1
4 TABLET, ORALLY DISINTEGRATING ORAL EVERY 6 HOURS PRN
Qty: 10 TABLET | Refills: 0 | Status: SHIPPED | OUTPATIENT
Start: 2025-04-28

## 2025-04-28 RX ORDER — BUTALBITAL, ACETAMINOPHEN AND CAFFEINE 50; 325; 40 MG/1; MG/1; MG/1
1 TABLET ORAL
Status: COMPLETED | OUTPATIENT
Start: 2025-04-28 | End: 2025-04-28

## 2025-04-28 RX ADMIN — PROCHLORPERAZINE EDISYLATE 5 MG: 5 INJECTION INTRAMUSCULAR; INTRAVENOUS at 03:04

## 2025-04-28 RX ADMIN — KETOROLAC TROMETHAMINE 15 MG: 30 INJECTION, SOLUTION INTRAMUSCULAR; INTRAVENOUS at 03:04

## 2025-04-28 RX ADMIN — BUTALBITAL, ACETAMINOPHEN, AND CAFFEINE 1 TABLET: 50; 325; 40 TABLET ORAL at 03:04

## 2025-04-28 NOTE — ED PROVIDER NOTES
Emergency Department Provider Note    Stephan Wallace   34 y.o. female   [id number]      4/28/2025       History     This history was obtained from the patient without limitations.  She drove herself to the ED.      She is a 34-year-old with the below past medical history.  She also reports a history of migraines.  She presents shortly after injuring her head.  She accidentally stood up and struck the top of her head on a medicine cabinet at around 13:30.  She immediately developed pain and bleeding at the site of impact.  She denies loss of consciousness.  She denies vision changes in hearing loss.  She denies neck pain and neck stiffness.  She denies weakness and numbness to her extremities.  She denies nausea and vomiting.  There was heavy bleeding that was controlled by applying direct pressure.  She also complains her typical migraine headache for several days that has not improved with multiple doses of sumatriptan.  She denies fever and chills.  She denies cold/flu symptoms.         Past Medical History:   Diagnosis Date    Anxiety     Depression     Dissociative and conversion disorder, unspecified     Fibroids       Past Surgical History:   Procedure Laterality Date    DENTAL IMPLANT      DILATION AND CURETTAGE OF UTERUS N/A 8/20/2024    Procedure: DILATION AND CURETTAGE, UTERUS;  Surgeon: Molly Vega MD;  Location: [facility] OR;  Service: OB/GYN;  Laterality: N/A;  RN PREOP 8/15/2024 -----TYPE & SCREEN---- UPT -- ON ARRIVAL  BMI--47.79    EXPLORATORY LAPAROTOMY WITH UTERINE MYOMECTOMY N/A 8/20/2024    Procedure: LAPAROTOMY, EXPLORATORY, WITH UTERINE MYOMECTOMY;  Surgeon: Molly Vega MD;  Location: [facility] OR;  Service: OB/GYN;  Laterality: N/A;      Family History   Problem Relation Name Age of Onset    No Known Problems Mother      No Known Problems Father      No Known Problems Sister      No Known Problems Brother        Social History     Socioeconomic History    Marital status: Single    Number of  children: 0    Highest education level: Associate degree: academic program   Tobacco Use    Smoking status: Never     Passive exposure: Never    Smokeless tobacco: Never   Substance and Sexual Activity    Alcohol use: Not Currently     Alcohol/week: 7.0 standard drinks of alcohol     Types: 4 Glasses of wine, 3 Drinks containing 0.5 oz of alcohol per week    Drug use: Not Currently     Frequency: 4.0 times per week     Types: Marijuana    Sexual activity: Yes     Partners: Male     Birth control/protection: Coitus interruptus, Condom, OCP   Social History Narrative    She is currently working right now.      Social Drivers of Health     Financial Resource Strain: Medium Risk (1/23/2025)    Overall Financial Resource Strain (CARDIA)     Difficulty of Paying Living Expenses: Somewhat hard   Food Insecurity: No Food Insecurity (1/23/2025)    Hunger Vital Sign     Worried About Running Out of Food in the Last Year: Never true     Ran Out of Food in the Last Year: Never true   Transportation Needs: No Transportation Needs (12/11/2023)    PRAPARE - Transportation     Lack of Transportation (Medical): No     Lack of Transportation (Non-Medical): No   Physical Activity: Inactive (1/23/2025)    Exercise Vital Sign     Days of Exercise per Week: 0 days     Minutes of Exercise per Session: 0 min   Stress: Stress Concern Present (1/23/2025)    [employer] [place] [employer] - Occupational Stress Questionnaire     Feeling of Stress : Very much   Housing Stability: Low Risk  (12/11/2023)    Housing Stability Vital Sign     Unable to Pay for Housing in the Last Year: No     Number of Places Lived in the Last Year: 1     Unstable Housing in the Last Year: No      Review of patient's allergies indicates:  No Known Allergies        Physical Examination     Initial Vitals [04/28/25 1358]   BP Pulse Resp Temp SpO2   (!) 150/94 102 16 98.5 °F (36.9 °C) 95 %      MAP       --           Physical Exam    Nursing note and vitals  reviewed.  Constitutional: She is not diaphoretic. No distress.   HENT:   Head: Normocephalic and atraumatic. Mouth/Throat: Oropharynx is clear and moist.   Eyes: Conjunctivae and EOM are normal. Pupils are equal, round, and reactive to light. No scleral icterus. Right eye exhibits no nystagmus. Left eye exhibits no nystagmus.   Neck: No JVD present.   Cardiovascular:  Normal rate, regular rhythm and normal heart sounds.     Exam reveals no gallop and no friction rub.       No murmur heard.  Pulmonary/Chest: Effort normal and breath sounds normal. No stridor. No respiratory distress. She has no decreased breath sounds. She has no wheezes. She has no rhonchi. She has no rales.   Abdominal: Abdomen is soft. She exhibits no distension. There is no abdominal tenderness.     Neurological: She is alert and oriented to person, place, and time. She has normal strength. No cranial nerve deficit. Coordination and gait normal. GCS score is 15. GCS eye subscore is 4. GCS verbal subscore is 5. GCS motor subscore is 6.   Skin: Skin is warm and dry. No pallor.   Small, mildly tender abrasion on the crown of the scalp.  No active bleeding from this wound.   Psychiatric: She has a normal mood and affect. Her speech is normal and behavior is normal. She is not actively hallucinating. She is attentive.            Labs     Labs Reviewed   POCT URINE PREGNANCY       Result Value    POC Preg Test, Ur Negative       Acceptable Yes          Imaging     Imaging Results    None           ED Course     The patient received the following medications:  Medications   ketorolac injection 15 mg (15 mg Intravenous Given 4/28/25 1540)   prochlorperazine injection Soln 5 mg (5 mg Intravenous Given 4/28/25 1540)   butalbital-acetaminophen-caffeine -40 mg per tablet 1 tablet (1 tablet Oral Given 4/28/25 1540)           ED Course as of 04/29/25 0648   Mon Apr 28, 2025   1643 Just reassessed. HA improved. [LP]      ED Course User  Index  [LP] Rei Brar III, MD        Medical Decision Making                 Medical Decision Making  The patient underwent evaluation following head trauma.  Her vital signs were normal.  She had no major signs of trauma.  She had no focal neurological deficits.  There was no indication for emergent head imaging.  Pregnancy was considered as a cause for her headache.  Pregnancy test was negative.  She was treated with IV Toradol, IV Compazine, and a dose of Fioricet.  Her headache improved with these therapies and she was discharged with prescriptions for Fioricet and Zofran ODT.  Close PCP follow-up was advised.  Standard ED return instructions/precautions were given.    Risk  Prescription drug management.              Diagnoses       ICD-10-CM ICD-9-CM   1. Minor head injury, initial encounter  S09.90XA 959.01   2. Abrasion of scalp, initial encounter  S00.01XA 910.0   3. Other migraine without status migrainosus, not intractable  G43.809 346.80         Dispostion      ED Disposition Condition    Discharge Stable            ED Prescriptions       Medication Sig Dispense Start Date End Date Auth. Provider    butalbital-acetaminophen-caffeine -40 mg (FIORICET, ESGIC) -40 mg per tablet Take 1 tablet by mouth every 6 (six) hours as needed for Headaches. 20 tablet 4/28/2025 -- Rei Brar III, MD    ondansetron (ZOFRAN-ODT) 4 MG TbDL Take 1 tablet (4 mg total) by mouth every 6 (six) hours as needed (nausea). 10 tablet 4/28/2025 -- Rei Brar III, MD            Follow-up Information       Follow up With Specialties Details Why Contact Info    Teena Kothari MD Family Medicine  Schedule a close in-person or virtual ER follow-up visit with your primary care provider. [address]  [place] [address]  [phone]      ER   Return to the ER if your condition worsens or you have any other concerns that you feel need immediate attention.               Rei Brar III,  MD  04/29/25 0652

## 2025-04-28 NOTE — DISCHARGE INSTRUCTIONS
We know that you have many choices and are honored that you chose us. We hope that we met or exceeded your expectations and goals for this visit and will keep the [facility] family in mind for your future needs and those of your family and friends.     - Dr. Brar

## 2025-04-28 NOTE — FIRST PROVIDER EVALUATION
Medical screening examination initiated.  I have conducted a focused provider triage encounter, findings are as follows:    Brief history of present illness:  Head injury with laceration.  Hit head on medicine cabinet    There were no vitals filed for this visit.    Pertinent physical exam:  Alert, normal gait, no active bleeding    Brief workup plan:  POCT upt, to intake    Preliminary workup initiated; this workup will be continued and followed by the physician or advanced practice provider that is assigned to the patient when roomed.

## 2025-04-28 NOTE — Clinical Note
"Stephan Kan" Madison was seen and treated in our emergency department on 4/28/2025.  She may return to work on 04/29/2025.       If you have any questions or concerns, please don't hesitate to call.      Chasidy Brady RN    "

## 2025-04-28 NOTE — PROGRESS NOTES
Called and spoke with patient to reschedule given provider illness today, patient asked for additional time to consider her schedule given recent medical issues and appts that have popped up for her, and her work schedule. Has FRP number, said that she will call us.    Miguel Santoyo PT, DPT

## 2025-04-28 NOTE — ED TRIAGE NOTES
Pt hit top of her head on medicine cabinet approx 1 hour ago. Pt endorses moderate bleeding. Pt had migraine that started this morning and states pain is worse now. Pt endorses nausea that she states began with her migraine. Pt denies vomiting, dizziness, weakness, numbness, tingling, LOC, blurred vision, or blood thinner usage. Pt endorses photophobia. Bleeding controlled in triage.

## 2025-04-29 ENCOUNTER — OFFICE VISIT (OUTPATIENT)
Facility: CLINIC | Age: 35
End: 2025-04-29
Payer: COMMERCIAL

## 2025-04-29 VITALS — BODY MASS INDEX: 45.31 KG/M2 | HEIGHT: 66 IN | WEIGHT: 281.94 LBS

## 2025-04-29 DIAGNOSIS — G43.E09 CHRONIC MIGRAINE WITH AURA WITHOUT STATUS MIGRAINOSUS, NOT INTRACTABLE: Primary | ICD-10-CM

## 2025-04-29 PROCEDURE — 99214 OFFICE O/P EST MOD 30 MIN: CPT | Mod: S$GLB,,, | Performed by: PHYSICIAN ASSISTANT

## 2025-04-29 PROCEDURE — G2211 COMPLEX E/M VISIT ADD ON: HCPCS | Mod: S$GLB,,, | Performed by: PHYSICIAN ASSISTANT

## 2025-04-29 PROCEDURE — 99999 PR PBB SHADOW E&M-EST. PATIENT-LVL III: CPT | Mod: PBBFAC,,, | Performed by: PHYSICIAN ASSISTANT

## 2025-04-29 RX ORDER — DICLOFENAC SODIUM 75 MG/1
75 TABLET, DELAYED RELEASE ORAL 2 TIMES DAILY
COMMUNITY
Start: 2025-02-04

## 2025-04-29 RX ORDER — METHOCARBAMOL 750 MG/1
750 TABLET, FILM COATED ORAL 4 TIMES DAILY PRN
COMMUNITY
Start: 2025-02-03

## 2025-04-29 NOTE — PROGRESS NOTES
Established Patient     SUBJECTIVE:  Patient ID: Stephan Wallace   Chief Complaint: Migraine and Headache    History of Present Illness:  Stephan Wallace is a 34 y.o. female with migraines, depression, anxiety, functional neurological movements, who presents to clinic with father for follow-up of headaches.       04/29/2025 - Interval History:  Per HA diary, pt has been experiencing 18-23/30 ha days per month. She felt medrol dose pack didn't help. She started emgality at the end of march and completed her 2nd round last week. In the last 30 days, she has had 16/30 ha days, mild to severe, lasting 21 hours - 3 days. She has been unable to receive ubrelvy thus far, called pharmacy w/ pt, will be ready for pickup today.  PA will be needed next month. Pt to msg when pa is sent. Of note, Yesterday pt struck the top of her head on a medicine cabinet upon standing. Denies LOC, neck pain/stiffness. She was seen in the ED yesterday w/ no neuro deficits on exam and small tender abrasion noted on her crown w/ no active bleeding. She was given IV toradol, IV compazine, and a dose of fioricet w/ improvement noted to HA, discharged w/ fioricet and zofran. She is still having HA today but denies all other ROS or associated symptoms. Pt to continue monitoring and inform clinic if symptoms occur and pcs clinic referral is needed.   Plan: continue emgality, continue limited imitrex prn, try ubrelvy 50mg prn, continue to track ha's, rtc 3 mo or sooner if needed    04/04/2025 - Interval History:  Ha's have worsened in the last month gradually ramping up to approximately 4-6 ha days per week. She has been taking imitrex which helps, but only receives so many tablets per month. She was unable to receive ubrelvy 50mg prn from [facility] and is unsure why. Advised pt to reach out to pharmacy to inquire further. She was able to start quilipta and found it helpful, but had to d/c sometime after last visit due to it no longer being  covered by insurance. She is amenable to switching back to emgality as thi shelped in the past. She has also been suffering w/ an intractable HA since Sunday. Will send medrol dose pack to break current cycle. Discussed disability paperwork and advised fmla/intermittent leave for ha's.   Plan: medrol dose pack to break current cycle, start emgality, continue limited imitrex prn, try ubrelvy 50mg prn if approved continue to track ha's, rtc 6 mo or sooner if needed    11/18/2024 - Interval History:  Ha's have improved. Per HA diary, are now occurring 8/30 days per month. Has been unable to obtain ubrelvy 2/2 to PA. Is taking imitrex as needed.   Plan: continue quilipta 60mg/d, try ubrelvy 50mg prn, continue imitrex tabs prn, continue to track ha's, rtc 6-12 mo or sooner if needed    08/02/2024 - Interval History:  Ha's have improved since starting quilipta, per HA diary they are occurring 13-16/30 days per month. Insurance requires scripts sent to [facility]. Was unable to receive the Imitrex NS.   Ubrelvy 50mg - cannot recall trying it  Imitrex 100mg- helps  Quilipta 30mg- helps  Plan: increase quilipta 60mg/d, try ubrelvy 50mg prn, continue imitrex tabs prn, continue to track ha's, rtc 2-3 mo or sooner if needed    4/12/24 Telephone Encounter:   Aimovig is complete plan/benefit exclusion. Qulipta is alternative.   Called pt and informed her, she is willing to try qulipta.   Started quilipta 30mg.     4/10/24 Telephone Encounter:   Called pt.   Insurance will not cover nurtec unless she has tried aimovig and ubrelvy.   Insurance will not cover ubrelvy unless she has tried 2 triptans. Has tried imitrex thus far.   Pt has been informed. She would like to try aimovig at this time.     04/05/2024 - Interval History:  Pt not currently tracking. Is unsure of frequency. Did have 2 HA's last week lasting 2 days each. Had 6 HA's in the last month lasting days each. Discussed options w/ pt. Including preventatives and abortives and  previous insurance responses (nurtec not approved until tried aimovig and ubrelvy first). Pt would like to try ubrelvy and track ha's.   Plan: try ubrelvy 50mg prn, continue imitrex tabs prn, track ha's, rtc 2-3 mo or sooner if needed    12/11/2023 - Interval History:  Pt has not been taking emgality for many months. Is unsure when she stopped taking it. Of note, they were very painful for her, she would like to avoid injectable options if possible. Ha's are occurring approximately 8/30 days per month. Pt is tracking, will send diary via [employer].   Nurtec - hasn't tried yet  Imitrex NS - hasn't tried yet  Imitrex tabs - help better than anything else taken so far  Plan: try nurtec preventatively, continue imitrex tabs prn, track ha's, rtc 2-3 mo or sooner if needed    11/13/2023 - Interval History:  Pt's ha's worsened in the last month, denies any changes at the time. Ha's occurring 17/30 days this month. Imitrex is helping, but is temporary.   Discussed otpions. Would like to try nurtec instead of emgality 2/2 painful when injecting.   Plan: try nurtec preventatively (if insurance denies, is amenable to injectable cgrp inhibitor), continue imitrex tabs prn, will try imitrex NS as 2nd line, rtc 2-3 mo or sooner if needed    06/20/2023 - Interval History:  Pt completed 3 rounds of emgality after last visit which helped greatly. She stopped taking it approximately 3 months ago. Ha's have been well controlled since last visit, occurring 1-2 times a month. Resolvign w/ imitrex.   Continues to follow w/ neurology regarding abnormal movements.   Plan: continue imitrex prn, continue neuro recs, rtc prn    Recommendations made at last Office Visit on 1/25/23:  - Discussed symptoms appear to be consistent with migraines, discussed treatment options and patient agreed with the following plan:  - ppx - start emgality  - abortive - continue imitrex 100mg tab prn  - anxiety/depression - continue cymbalta 60mg/d, mgmt per  psychiatrist  - risks, benefits, and potential side effects of emgality, imitrex discussed   - alternative treatment options offered   - importance of healthy diet, regular exercise and sleep hygiene in the treatment of headaches    - Start tracking headaches via [facility] [employer] [facility] on phone   - RTC in 4 mo or sooner if needed     Treatments Tried:  Emgality - completed 3 rounds, helped,  painful  Tpx - face tingling  Cymbalta 60mg/d - helps depression/anxiety  Quilipta 60mg - helps, no longer covered by insurance  Imitrex 100mg - helps  Ubrelvy 50mg - trouble w/ insurance coverage  Excedrin  Advil   Medrol dose pack - didn't help    Current Medications:    Current Outpatient Medications:     AUVELITY  mg TbIE, Take  mg by mouth 2 (two) times a day., Disp: , Rfl:     butalbital-acetaminophen-caffeine -40 mg (FIORICET, ESGIC) -40 mg per tablet, Take 1 tablet by mouth every 6 (six) hours as needed for Headaches., Disp: 20 tablet, Rfl: 0    diazePAM (VALIUM) 5 MG tablet, Take 5 mg by mouth every 6 (six) hours as needed for Anxiety., Disp: , Rfl:     diclofenac (VOLTAREN) 75 MG EC tablet, Take 75 mg by mouth 2 (two) times daily., Disp: , Rfl:     drospirenone-ethinyl estradioL (FRANCESCA) 3-0.03 mg per tablet, Take 1 tablet by mouth once daily., Disp: 30 tablet, Rfl: 11    galcanezumab-gnlm 120 mg/mL PnIj, Inject 1 mL (120 mg total) into the skin every 28 days. Begin 28 days after loading dose., Disp: 1 mL, Rfl: 11    methocarbamoL (ROBAXIN) 750 MG Tab, Take 750 mg by mouth 4 (four) times daily as needed., Disp: , Rfl:     naloxone (NARCAN) 4 mg/actuation Spry, 4mg by nasal route as needed for opioid overdose; may repeat every 2-3 minutes in alternating nostrils until medical help arrives. Call 911, Disp: 1 each, Rfl: 11    ondansetron (ZOFRAN-ODT) 4 MG TbDL, Take 1 tablet (4 mg total) by mouth every 6 (six) hours as needed (nausea)., Disp: 10 tablet, Rfl: 0    sumatriptan (IMITREX) 100 MG tablet,  "Take 1 tab PO at onset of migraine, can repeat in 2 hrs if needed.  No more than twice per day or 3 days/wk., Disp: 18 tablet, Rfl: 5    methylPREDNISolone (MEDROL DOSEPACK) 4 mg tablet, use as directed, Disp: 1 each, Rfl: 0    ubrogepant (UBRELVY) 50 mg tablet, Take 1 tablet by mouth at the onset of a headache. May repeat based on response and tolerability after more than 2 hours if needed. Do not take more than 200mg in a 24 hour span., Disp: 16 tablet, Rfl: 5  No current facility-administered medications for this visit.    Review of Systems - as per HPI, otherwise a balanced 10 systems review is negative.    OBJECTIVE:  Vitals:  Ht 5' 6" (1.676 m)   Wt 127.9 kg (281 lb 15.5 oz)   BMI 45.51 kg/m²      Physical Exam:  Constitutional: she appears well-developed and well-nourished. she is well groomed. NAD   HENT:    Head: Normocephalic and atraumatic  Eyes: Conjunctivae and EOM are normal  Musculoskeletal: Normal range of motion. No joint stiffness.   Skin: Skin is warm and dry.  Psychiatric: Mood and affect are normal    Neuro: Patient is alert and oriented to person, place, and time. Language is intact and fluent. Speech is clear and fluent. Recent and remote memory are intact.  Normal attention and concentration.  Facial movement is symmetric. Moves all 4 extremities against gravity. Gait and station normal.  Cranial Nerves II through XII without focal deficit.     Review of Data:   Notes from neuro reviewed   Labs:  Admission on 04/28/2025, Discharged on 04/28/2025   Component Date Value Ref Range Status    POC Preg Test, Ur 04/28/2025 Negative  Negative Final     Acceptable 04/28/2025 Yes   Final     Imaging:  Results for orders placed or performed during the hospital encounter of 07/06/22   CT Head Without Contrast    Narrative    EXAMINATION:  CT HEAD WITHOUT CONTRAST    CLINICAL HISTORY:  Headache, sudden, severe;    TECHNIQUE:  Low dose axial images were obtained through the head.  Coronal " and sagittal reformations were also performed. Contrast was not administered.    COMPARISON:  None.    FINDINGS:  The subcutaneous tissues are unremarkable.  The bony calvarium is intact.  The paranasal sinuses are unremarkable.  The mastoid air cells are clear.  The orbits and intraorbital contents are unremarkable.    The craniocervical junction is intact.  The midline structures are unremarkable.  There are no extra-axial fluid collections.  There is no evidence of intracranial hemorrhage.  The ventricles and sulci are within normal limits.  The cisterns are unremarkable.  The gray-white differentiation is maintained.  There is no dense vessel sign.  There is no evidence of mass effect.      Impression    No acute process.  Follow-up, as clinically warranted.      Electronically signed by: Adam De MD  Date:    07/06/2022  Time:    18:28     Note: I have independently reviewed any/all imaging/labs/tests and agree with the report (s) as documented.  Any discrepancies will be as noted/demarcated by free text.  JOSUÉ RODRIGUEZ 4/29/2025    ASSESSMENT:  1. Chronic migraine with aura without status migrainosus, not intractable          PLAN:  - Discussed symptoms appear to be consistent with migraines, discussed treatment options and patient agreed with the following plan:  - ppx - restart emgality  - abortive - continue imitrex 100mg tab prn, try ubrelvy 50mg prn  - anxiety/depression - mgmt per psychiatrist  - abnormal movements - mgmt per neuro  - continue to track ha's  - Discussed goals of therapy are to decrease the frequency, intensity, and duration of headaches  - RTC 3 mo            Questions and concerns were sought and answered to the patient's stated verbal satisfaction.  The patient verbalizes understanding and agreement with the above stated treatment plan.     CC: Teena Kothari MD Sarena Patel, PA-C  [facility]   [phone]    Dr. Doyle was available during today's  encounter.       Visit today included increased complexity associated with the care of the episodic problem migraines addressed and managing the longitudinal care of the patient due to the serious and/or complex managed problem(s) migraines.

## 2025-05-07 NOTE — PROGRESS NOTES
"[facility]    Functional Restoration Program  Physical Therapy Treatment and DISCHARGE Note  FRP 1 MONTH F/U    Name: Stephan Wallace  MRN:[id number]      Therapy Diagnosis:   Encounter Diagnosis   Name Primary?    Decreased functional activity tolerance Yes       Physician: Aleksandra Rodriguez NP    Date: 5/9/2025    Physician Orders: PT Eval and Treat   Medical Diagnosis from Referral:   F44.4 (ICD-10-CM) - Functional neurological symptom disorder with abnormal movement   R53.1,Z74.09,R68.89 (ICD-10-CM) - Decreased strength, endurance, and mobility   R53.83 (ICD-10-CM) - Fatigue, unspecified type      Evaluation Date: 1/10/2025  Authorization Period Expiration: 12/23/25  Plan of Care Expiration: 5/9/25 (extension requested by Miguel Santoyo PT on 5/9/25 to accommodate 1 month f/u)  Visit # / Visits authorized: 15/20   FOTO: 03/ 03       Precautions: Standard     Time In: 12:00pm  Time Out: 13:00pm  Total Billable Time: 60 mins      SUBJECTIVE     Stephan reports: "I've been surviving."    What's going well: has been having a lot less physical symptoms of FND recently unless very stressed, but cognitive effects have felt worse (brain fog, increased time for info processing, disconnect between brain and body, difficulty with writing)    What's difficult: work has been very difficult, is currently on intermittent leave of absence due to migraines.     Opportunities: mindfulness, short sessions, and a focus with therapist and psychiatrist on depression relief         Patient's FRP goals:  "go back to normalcy" dec random movements, improved speech pattern     From past sessions:   FRP seems to bring on more frustration.   Work is very stressful.  Self motivation is difficult      Current 2/10  Location(s): facial  and UE symptoms, effected speech    OBJECTIVE     Objective Measures updated at progress report unless specified.     Range of Motion - Cervical    AROM AROM AROM     1/10/2025 " "Reassessment  03/07/25   Reassessment  5/9/25   Flexion WFL ° WFL° WFL °   Extension Min loss ° Min loss° Min loss °   Side bending Right Min loss ° Min loss° WFL °   Side bending Left Min loss ° Min loss° WFL °   Rotation Right WFL ° Min loss° Min loss°   Rotation Left WFL  ° Min loss° Min loss °      Range of Motion - Lumbar    1/10/2025  03/07/25   5/9/25     ROM Loss ROM Loss ROM Loss   Flexion within functional limits  WFL  WFL   Extension within functional limits  patient refuse, unable to report why    WFL   Side bending Right minimal loss  WFL  WFL   Side bending Left minimal loss  WFL  WFL   Rotation Right minimal loss  min loss  Min loss   Rotation Left minimal loss  min loss  Mod loss      Strength Testing             1/10/2025 Reassessment  03/07/25  Reassessment  5/9/25   Motion Tested               Lower Extremity R L R L R L   Hip Flexion 4+/5 4+/5  4+/5 4+/5   5/5 5/5   Hip Extension 4/5 4/5 4+/5  4+/5   4+/5 4+/5    Hip Abduction seated 4+/5 4+/5 4+/5 4+/5  5/5 5/5   Hip IR 4+/5 4+/5  4/5 4+/5   4+/5 4+/5   Knee Extension 5/5 5/5  5/5 5/5   5/5 5/5    Knee Flexion 5/5 5/5 5/5  5/5   5/5  5/5      Functional Testing       1/10/2025 Reassessment  03/07/25  Reassessment  5/9/25   Timed Up and Go 9.65 sec 11.62, 11.19 sec 9.87 sec   Single Limb Stance R LE 20 sec fell when cued to "breathe" > 30 sec >30 sec   Single Limb Stance L LE 4.54 sec 17.79 sec Deferred 2/2 use of ankle boot   2 Minute Walk Test 146 meters 127 m feet   6 Minute Walk Test 372 meters 383 m Deferred 2/ use of ankle boot   Self Selected Walking Speed 1.03 m/sec 1.06 m/sec m/sec          Limitation/Restriction for FOTO ANKLE Survey     Therapist reviewed FOTO scores for Stephan D Encalade on 1/10/2025.   FOTO documents entered into EPIC - see Media section.     INTAKE Score 1/10/2025: 63% (53/80 LEFS)   03/07/25: 65% (58/80 LEFS)   5/9/25: 67%     Predicted Score: 70%     MDC = 9 points             Treatment       Stephan received the " Individualized Treatments listed below:     Stephan participated in dynamic functional therapeutic activities to improve functional performance for 60 minutes, including:     Re-Assessment:  6 MWT  TUG Test  Manual Muscle Testing  SL Balance Testing  Range of motion Testing      Self Prescribed Stretches   LTR x 10  Open Books x 10 each side   Prone press ups x 10     (After Fitter Board) Tailgators  2# ankle weight for sx self management    (After patient defer LE exercises on BATCA)  Standing calf heel raises x 20, heel eccentrics from side of TM  Standing alternating hip ext, hip AB x 15 each side  no stopping between directions        NP:   Functional Activity Endurance   DATE ACTIVITY DURATION DETAILS OTHER/PRE   2/21/25 SciFit 12 min Level 1 3/10   02/26/25 SciFit 19 min Level 1 4/10   02/28/25 SciFit 10:30 min Level 1  3/10   02/28/25 Hallway ambulation 5 min  4/10   3/5/25 Sci Fit 20 min Level 1, no UE's 4/10   3/12/25 TM 12 min  5/10   3/14/25 Stationary bike 12 min Level 2 5/10 (knee pain)   3/18/25 Stationary bike 8 min Level 1 4/10   3/18/2025 Up/down   6 flight of stairs 4/10   TUG and 6MWT                  Mindfulness: Body scan, Progressive Muscle Relaxation  Mindfulness-based activity involving deep breathing, mindful awareness of the body and headspace  Used to activate parasympathetic nervous system to decrease autonomic threat perception and thus reduce central sensitivity to pain  Patient demonstrate safe performance     Somatic Tracking:  Mindfulness-based, guided imagery to reduce fear-avoidance and catastrophizing around physical symptoms in body  Guided patient through attending to negative (painful), neutral, and positive sensations in the body  Used imagery and metaphors to reduce fear and hyperfixation on symptoms  Used tennis ball in hands and breathing as grounding sensations to reduce fear upon examining sx in body    Functional Game Activity  Used Ball Toss activity to improve  reaching, stepping, reaction to external stimuli, and to train dynamic balance  Pt required to bend, reach,  objects from floor, and throw  Improved accuracy and functional mechanics over course of activity  Cues provided for postural awareness          Stephan participated in neuromuscular re-education activities to improve: Balance, Coordination, Kinesthetic, Sense, Proprioception, and Posture for 00 minutes. The following activities were included:      Fitter board - high setting, 20 tilt bouts   Fwd/Bwd    Trial 1-  2 hands on fade to finger tip, EO, mod timing /c 30 bpm     Trial 2 - 2 hands on, EC, 30 bpm    Lat    Trial 1- 1 hand on, poor control 30 bpm, patient report inc L knee discomfort        Peripheral muscle strengthening which included 1-2 sets of 10-20 repetitions at a slow, controlled 5-7 second per rep pace focused on strengthening supporting musculature for improved body mechanics & functional mobility.  Patient & therapist focused on proper form & speed during treatment to ensure optimal strengthening of each targeted muscle group & neuromuscular re-education. Patients are instructed to keep sets/reps with proper load to stay at 3-5 out of 10 on the provided modified Panchito exertion scale.    BATCA Machine Exercises Weight (lbs) Sets Repetitions Panchito Exertion Scale Rating   Leg Extension        Hamstring Curls       Chest Press 22.5  20 4   Pec Fly 20  15 4   Lat Pull Down 25 1 20 4   Mid Row 35 1 20 4   Bicep Bar Curls       Standing Tricep Extension       Single Leg Press  R/L 30 Patient defer performance 20      NP:   Balance:  Narrow stance on Airex pad  2x20 tosses with beach ball  Challenged dynamic stability and provided mental distraction to reduce functional neurological sx      Stephan received therapeutic exercises to develop strength, endurance, ROM, flexibility, posture, and core stabilization for 00 minutes including:    Prone knee stretch c/ strap 30 sec x3  DARLING x1 min  Prone  press ups x10   Supine SAQ L over bolster x15  (PRE 3/10)      Patient Education and Home Exercises     Home Exercises Provided and Patient Education Provided     Education provided:   - FRP Educational Topics: Physical & Psychological Pain Cycles, Diaphragmatic Breathing, Activity Pacing for Pain & Debility , Resisted Exercise Basics, Mindfulness Resources, and Posture & Body Mechanics      Written Home Exercises Provided: Patient instructed to cont prior HEP. Exercises were reviewed and Stephan was able to demonstrate them prior to the end of the session.  Stephan demonstrated good  understanding of the education provided. See EMR under Patient Instructions for information provided during therapy sessions    ASSESSMENT   Stephan presents for 1 month follow-up to FR cohort program reporting difficulty maintaining consistency with walking, stretch routine, mindfulness, HEP and resistance exercise plan; however, this is within context of increased depression symptoms in past month and significant work stress. Stephan demonstrates retained learning of FRP concepts, and understands need for movement and nervous system soothing. Her functional testing, range of motion testing, and manual muscle testing show moderate improvement from baseline, as does their FOTO score. She has met all short-term and nearly all long-term goals, and so is appropriate for discharge from physical therapy care at this time.    Pt's spiritual, cultural and educational needs considered and pt agreeable to plan of care and goals.     Anticipated Barriers for therapy: chronic nature of issues, psychosocial factors, central sensitization    GOALS: Pt is in agreement with the following goals:   Goal Progress Towards Goal   SHORT Term: In 3 weeks, pt will:     Verbalize & demonstrate good understanding of resisted training with 3-1-3 second rep for [id number] contraction to encourage full muscle recruitment for strength &  "endurance. MET 03/21/25   Verbalize & demonstrate good understanding of home stretch routine to improve AROM, help decrease pain & improve mobility. MET 5/9/25   Demonstrate proper supine or seated diaphragmatic breathing with decreased chest excursion & emphasis on full abdominal excursion & increased expiration time to promote muscle relaxation & increased parasympathetic activity to improve patient tolerance to activities. MET 5/9/25   Verbalize good understanding of importance of proper posture in resisted exercise, functional activities & ADL's in order to help reduce postural strain, promote proper breathing. MET 5/9/25   Demonstrate good understanding of full body resisted exercises w/ use of pictures &/or verbal cues to promote independence w/ exercise at the end of the program. MET 5/9/25   Verbalize plan for continuing resisted exercises w/ specific location (i.e. commercial gym, home, community fitness center)  to incorporate all major muscle groups to maintain & progress therapeutic functional improvements. PARTIALLY MET 5/9/25   Verbalize difference between  "hurt vs harm"  to demonstrate understanding of fear avoidance in pain cycle.  MET 5/9/25         Midterm: In 8 weeks, pt will:     Verbalize good understanding of activities planning/scheduling (stretching, mindfulness, resisted training, & cardio) prescribed by PT/OT following the end of the program to sustain & progress functional improvements. not MET 5/9/25   Perform selected resisted training w/ minimal to no cuing in therapy session to be independent w/ resisted strengthening. MET 5/9/25   Demonstrate improved symptom self-management w/ posture/positioning and mechanical movements and/or implementation of appropriate modalities throughout a typical day.  MET 5/9/25   Demonstrate independence w/ home stretch routine to improve AROM, help decrease pain & improve mobility. PARTIALLY MET 5/9/25         Long Term: In 12 weeks, pt will:     Perform " selected cardio & resisted training independently to continue safe regular performance of daily exercise. PARTIALLY MET 5/9/25   Improve  6 MWT to 395 meters or greater to improve gait speed and mobility. NOT MET 5/9/25 due to use of boot and tendinopathy   Perform LLE single leg stance 20 seconds or greater bilaterally to improve safety and balance in ADLs. NOT MET 5/9/25 due to use of boot and tendinopathy   Demonstrate Timed Up & Go (with appropriate assistive device, if necessary) of 8 seconds or less to decrease fall risk and improve functional mobility. not MET 5/9/25   Score 70% or more on ANKLE FOTO to indicate significant functional improvements in impaired functions secondary to pain. NEARLY MET 5/9/25                PLAN     Discharge patient to home plan       Miguel Santoyo, PT

## 2025-05-09 ENCOUNTER — CLINICAL SUPPORT (OUTPATIENT)
Dept: REHABILITATION | Facility: OTHER | Age: 35
End: 2025-05-09
Payer: COMMERCIAL

## 2025-05-09 DIAGNOSIS — R68.89 DECREASED FUNCTIONAL ACTIVITY TOLERANCE: Primary | ICD-10-CM

## 2025-05-09 PROCEDURE — 97530 THERAPEUTIC ACTIVITIES: CPT

## 2025-05-11 DIAGNOSIS — N93.9 ABNORMAL UTERINE BLEEDING (AUB): ICD-10-CM

## 2025-05-11 DIAGNOSIS — D25.9 UTERINE LEIOMYOMA, UNSPECIFIED LOCATION: ICD-10-CM

## 2025-05-11 RX ORDER — DROSPIRENONE AND ETHINYL ESTRADIOL 0.03MG-3MG
1 KIT ORAL
Qty: 84 TABLET | Refills: 1 | Status: SHIPPED | OUTPATIENT
Start: 2025-05-11

## 2025-05-12 ENCOUNTER — PATIENT MESSAGE (OUTPATIENT)
Facility: CLINIC | Age: 35
End: 2025-05-12
Payer: COMMERCIAL

## 2025-05-12 DIAGNOSIS — G43.109 MIGRAINE WITH AURA AND WITHOUT STATUS MIGRAINOSUS, NOT INTRACTABLE: ICD-10-CM

## 2025-05-12 DIAGNOSIS — G43.E09 CHRONIC MIGRAINE WITH AURA WITHOUT STATUS MIGRAINOSUS, NOT INTRACTABLE: Primary | ICD-10-CM

## 2025-05-12 RX ORDER — DROSPIRENONE AND ETHINYL ESTRADIOL 0.03MG-3MG
1 KIT ORAL DAILY
Qty: 30 TABLET | Refills: 11 | Status: SHIPPED | OUTPATIENT
Start: 2025-05-12 | End: 2026-05-12

## 2025-05-12 RX ORDER — UBROGEPANT 100 MG/1
TABLET ORAL
Qty: 16 TABLET | Refills: 5 | Status: SHIPPED | OUTPATIENT
Start: 2025-05-12 | End: 2026-05-12

## 2025-05-12 NOTE — TELEPHONE ENCOUNTER
Refill Decision Note   [facility]  is requesting a refill authorization.  Brief Assessment and Rationale for Refill:  Approve     Medication Therapy Plan:         Comments:     Note composed:10:46 PM 05/11/2025

## 2025-05-19 ENCOUNTER — OFFICE VISIT (OUTPATIENT)
Dept: ORTHOPEDICS | Facility: CLINIC | Age: 35
End: 2025-05-19
Payer: COMMERCIAL

## 2025-05-19 VITALS — WEIGHT: 281.94 LBS | HEIGHT: 66 IN | BODY MASS INDEX: 45.31 KG/M2

## 2025-05-19 DIAGNOSIS — M25.572 PAIN OF JOINT OF LEFT ANKLE AND FOOT: ICD-10-CM

## 2025-05-19 DIAGNOSIS — M76.62 LEFT ACHILLES TENDINITIS: Primary | ICD-10-CM

## 2025-05-19 DIAGNOSIS — M25.572 ACUTE LEFT ANKLE PAIN: ICD-10-CM

## 2025-05-19 PROCEDURE — 99999 PR PBB SHADOW E&M-EST. PATIENT-LVL III: CPT | Mod: PBBFAC,,, | Performed by: ORTHOPAEDIC SURGERY

## 2025-05-19 PROCEDURE — 99213 OFFICE O/P EST LOW 20 MIN: CPT | Mod: S$GLB,,, | Performed by: ORTHOPAEDIC SURGERY

## 2025-05-19 NOTE — PROGRESS NOTES
Stephan Wallace  Returns today for follow-up this is a 34-year-old female who presented to me on 04/07/2025 four chronic Achilles pain as well as a more recent onset of midfoot and anterior ankle pain that had begun about two months prior to her initial visit.  X-rays of the left foot and ankle did not show any obvious abnormalities.  My impression was chronic left Achilles tendinitis and I recommended a four week course of boot immobilization.  She returns today and reports that her Achilles tendon is improved but she continues to have pain anterior to her ankle and extending distally over the midtarsal joints.  She reports pain mainly with activity as well as with motion of the ankle.    Examination:  She walks in today with the boot on.  Inspection of the left ankle reveals no significant swelling.  On sitting exam she has less tenderness over the Achilles tendon than previous.  She has functional motion of the ankle but does report pain with dorsiflexion and extreme plantar flexion.  She has some mild tenderness anteriorly about the ankle and the midtarsal joints.  She has decent function and strength of the other tendons about her ankle.  She is neurovascularly intact.    Impression:   1. Left Achilles tendinitis  MRI Ankle Without Contrast Left      2. Acute left ankle pain  MRI Ankle Without Contrast Left      3. Pain of joint of left ankle and foot  MRI Ankle Without Contrast Left            Recommendation:  It is encouraging that her Achilles tendon is improved but she is having continued anterior ankle and dorsum of the left foot pain.  I would like to obtain an MRI scan before making further recommendation.  My hope would be to be able to send her to physical therapy unless there is some significant structural abnormality.    Follow-up with results of MRI

## 2025-05-19 NOTE — PROGRESS NOTES
I have met with the above student and agree with the assessment and note written. I was present for this encounter. Any changes have been made and authorized by me.  Zeynep Reyes, [facility] 05/18/2025 9:55 PM

## 2025-05-19 NOTE — PROGRESS NOTES
I have met with the above student and agree with the assessment and note written. I was present for this encounter. Any changes have been made and authorized by me.  Zyenep Reyes, [facility] 05/19/2025 2:58 PM

## 2025-05-28 ENCOUNTER — DOCUMENTATION ONLY (OUTPATIENT)
Dept: REHABILITATION | Facility: OTHER | Age: 35
End: 2025-05-28
Payer: COMMERCIAL

## 2025-05-28 NOTE — PROGRESS NOTES
[facility]  Functional Restoration Program  Occupational Therapy Discharge Summary    Name: Stephan Wallace  MRN:[id number]    Physician Orders: eval and treat; FRP  Medical Diagnosis:   F44.4 (ICD-10-CM) - Functional neurological symptom disorder with abnormal movement   R53.1,Z74.09,R68.89 (ICD-10-CM) - Decreased strength, endurance, and mobility  R53.83 (ICD-10-CM) - Fatigue, unspecified type  Evaluation Date: 1/10/2025  Plan of Care Certification Period: 2025  Visits 14, plus eval     Precautions:  Standard and Fall    SUBJECTIVE     Pain:     Currently rating pain as 5/10 at start of session.  Functional Pain Scale Rating 0-10: within the last 24 hours  Date 1/10/2025 2025 3/6/2025   Average 5/10 5/10 7/10   Worst 7/10 8/10 9/10   Best 1/10 3/10 3/10   Composite score 4.33/10 5.33/10 6.33/10   [JESUS  is 1 point or 15-20% change in interference composite score (Cleo et al. 2008)]     Location: head/face, mouth, B hand    Description: fatigue, exhaustion  Aggravating Factors: anxiety, depression, stress    Easing Factors: anxiety meds     Patient Specific Activities based on Personal goals:  Activity  2025 3/14/2025     Performance (P) Satisfaction (S) P S   Driving 5 2 8 10   2.  Engaging in L[employer] activities 5 4 5 7   3.  Engaging in activities outside of work 5 4 6.5 4   4.  Walking (grocery store, in the park/festival 7 3 7 8   5.  Chores 6 2 4 4             Total Score  5.6 5  6.1 6.6   Ratin-10; unable/unsatisfied - Able/Satisfied     Objective     Objective Measures updated at progress report unless specified.    COGNITIVE Exam  Behaviors: pleasant, cooperative. Movements/tics throughout session, in face, UE. Reports difficulties initiation.  Memory: brain fog. Difficulty multitasking. Not tested during eval.  Safety awareness/insight to disability: impaired judgment and impaired insight; pushes through at work, unable to do this outside work.   Coping skills/emotional  control: listening to music. During session anxious, but participating.     Visual/Perceptual:  Tracking: attempted to test; difficult tracking due to movements affecting maintaining eye on target.  Saccades: not tested  Acuity: wears glasses  Convergence: impaired, with decreased ability to accommodate.     Dominant hand: right     Active Range of Motion  Upper Extremity 1/10/2025    RUE LUE   Hands WFL WFL   Wrist WFL WFL   Elbows WFL WFL   Shoulders WFL WFL      Upper Extremity Strength - Manual Muscle Testing  Motion Tested 1/10/2025    Right Left    Shoulder Flexion 4+/5 5/5   Shoulder Extension 5/5 5/5   Shoulder Abduction 5/5 5/5   Shoulder IR 5/5 5/5   Shoulder ER 4/5 4+/5   Elbow Flexion 5/5 5/5   Elbow Extension 5/5 5/5       Strength (in pounds, rung III, average of three trials)   2/26/2025 3/7/2025     Left 40.67 lbs 55.33 lbs   Right 36.33 lbs 62.33 lbs    [norms for women aged 35-39: R=74.1 +/-10.8; L=66.3 +/-11.7 (Ed et al, 1985)]     Functional Strength  Pile Testing: Lifting    1/10/2025 (lbs/HR/CHIOMA) 2/26/2025  (lbs/HR/CHIOMA) 3/7/2025   (lbs/HR/CHIOMA)    Repetitive Floor to Waist NT due to time 8/nt/5  8/109/5   Repetitive Waist to Shoulder  18/101/4  13/107/4    1- Time Maximum   24/97/5 NT   Carry-2 Handed (40ft)   NT NT   Work demand Level   TBD Light (#20 max, frequent lifting/carrying #10, 2.5 METS      Reason for stop point   Body mechanics, time needed for reps Body mechanics, time needed for reps    comment Increased frustration reported after instruction given. [facility]. -  Shoulder elevation noted.   The work demand level listed above is based on the physical performance screening test performed. More comprehensive physical testing integrated with clinical findings would be required to derived an accurate work capacity.      Balance  5 times sit-stand (adults 18-63 y/o) 1/10/2025 3/6/2025    >12 sec= fall risk for general elderly  >16 sec= fall risk for Parkinson's  disease  >10 sec= balance/vestibular dysfunction (<59 y/o)  >14.2 sec= balance/vestibular dysfunction (>59 y/o)  >12 sec= fall risk for CVA 13.47 seconds     (without UE used to push up from chair) 17.9 seconds     (With UE on thighs)            Endurance Testing: Modified Ramp Treadmill Test    1/10/2025 3/7/2025    Minutes Completed  3 minutes 5 seconds  3 minutes   % Grades  10 %  10%   Speed (mph)  2.1 mph attempted  1.7 mph   METS 4  4    bpm  122 bpm   Panchito Rating Perceived Exertion Scale   5  4   Reason for stop point Fatigue, Decline in maintaining pace safely , Increased discomfort  Increased discomfort   Comments Reports achilles pain left  -          Limitation/Restriction for FOTO NOC Survey     Therapist reviewed FOTO scores for Stephan Wallace on 3/19/2025.  FOTO documents entered into [employer] [facility] - see Media section.     Limitation Score: 32%                 Patient Education and Home Exercises   Education provided:   - Progress towards goals   - importance of completion of exercises and activities outside of session to attain goals.   - proper posture and body mechanics.  - anticipated muscular soreness following lift testing and strategies for management including stretching, using ice, scheduled rest.  - Functional pain scale  - PANCHITO rate of perceived exertion scale.   - pain cycle  - pursed lip and diaphragmatic breathing techniques  - details of the Functional Restoration Program  - palming  - options for PNS activation: humming, singing out loud.   - hurt vs harm  - safety in me messaging vs danger in me  - brain/body connected  - importance of trunk stability for UE functions.  - overhead shoulder stretches using dowel    See EMR under Patient Instructions or in FRP binder provided on day 1 of the cohort.     Assessment     Due to family emergency, unable to attend today's appointment. As outside of plan of care, will discharge Stephan at this time, and recommend follow through with FRP  appointments as planned for the next 9 months.     Status of goals as per last reassessment:    Goals:     SHORT Term goals: In 3 weeks, patient will:  Status of goal   Implements correct use of breathing techniques during functional lifting tasks, with minimal cues needed. Progressing   Utilizes CHIOMA rate of exertion scale to pace performance with functional lifting tasks, and implements self-care strategies during activity participation to manage pain. NOT MET   Verbalize understanding of energy conservation and pacing strategies during daily habits, roles, and routines in order to improve tolerance for work and home demands.  NOT MET   Completes 5x sit to stand test in 12 seconds or less to improve ability to participate in community mobility.   NOT MET   Demonstrate increased positional tolerance to the level needed for work, home, and community activities (standing in cue at social event, managing supplies for outing, streneous IADL), as evidenced by having a METS level of 5. NOT MET   Demonstrate proper body mechanics with functional lifting tasks (floor to waist, waist to shoulder, maximum lift, and box carry), via teach back method with minimal cues needed. NOT MET   Demonstrate proper sequencing  when lifting waist to shoulder for management of (I)ADL, including dressing and grooming, placing items in kitchen cabinets, folding towels, and/or managing suitcase when traveling.   Progressing   Demonstrate proper sequencing when lifting floor to waist to meet demand of work/home routine, including lifting groceries, managing laundry, car parts, and/or managing suitcase when traveling.   NOT MET   Tolerate Home Activity Plan (HAP)/ Home Exercise Program (HEP) to promote safety and independence with ADL, with report of completion of at least 2 out of 4 days outside of program participation.  Progressing   Show improved perception of own abilities as evidenced by increase of FOTO scores to established MDC value  and perception of performance ratings regarding personal long term goals.  Progressing         Long Term goals: In 9 weeks, patient will: Status of goal   Report implementation of application of mindfulness, stretching, and other coping strategies for improved participation in daily routine. Progressing   Verbalize functional application of lifting techniques in daily life (golfers lift, floor to waist, waist to shoulder). Progressing   Completes 5x sit to stand test in 9 seconds or less to improve ability to participate in community mobility.  Progressing   Applies functional application of lifting techniques (golfer's lift, floor to waist, waist to shoulder) in activities of daily life, per patient report.  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 Progressing   Have an improvement of 3 points in 2/5 personal goals in rating of  performance.  Progressing   Show improved perception of own abilities as evidenced by increase of FOTO scores to established MC II value and perception of performance ratings regarding personal long term goals.  Progressing      Patient Specific Goals; to be met after 2 month of (I) application of tools/techniques learned.  Vocational:  at work   Recreational: engaging in hobby, playing guitar, [employer] activities   Daily Living Activities: driving, chores   Measured by patient's self-reported performance and satisfaction of personal FR goals, listed above in subjective section.   Total Score = sum of the activity performance scores / number of activities  Minimum detectable change (90%CI) for average score = 2 points  Minimum detectable change (90%CI) for single activity score  = 3 points    Plan     Discharge from Occupational Therapy at this time.     DAYO sorto  SHIRLEY Pinzon/DORA, CEAS-I  5/28/2025

## 2025-05-29 ENCOUNTER — HOSPITAL ENCOUNTER (OUTPATIENT)
Dept: RADIOLOGY | Facility: HOSPITAL | Age: 35
Discharge: HOME | End: 2025-05-29
Attending: ORTHOPAEDIC SURGERY
Payer: COMMERCIAL

## 2025-05-29 DIAGNOSIS — M76.62 LEFT ACHILLES TENDINITIS: ICD-10-CM

## 2025-05-29 DIAGNOSIS — M25.572 ACUTE LEFT ANKLE PAIN: ICD-10-CM

## 2025-05-29 DIAGNOSIS — M25.572 PAIN OF JOINT OF LEFT ANKLE AND FOOT: ICD-10-CM

## 2025-05-29 PROCEDURE — 73721 MRI JNT OF LWR EXTRE W/O DYE: CPT | Mod: 26,LT,, | Performed by: RADIOLOGY

## 2025-05-29 PROCEDURE — 73721 MRI JNT OF LWR EXTRE W/O DYE: CPT | Mod: TC,LT

## 2025-06-01 ENCOUNTER — PATIENT MESSAGE (OUTPATIENT)
Dept: FAMILY MEDICINE | Facility: CLINIC | Age: 35
End: 2025-06-01
Payer: COMMERCIAL

## 2025-06-04 ENCOUNTER — PATIENT MESSAGE (OUTPATIENT)
Dept: FAMILY MEDICINE | Facility: CLINIC | Age: 35
End: 2025-06-04

## 2025-06-09 ENCOUNTER — OFFICE VISIT (OUTPATIENT)
Dept: ORTHOPEDICS | Facility: CLINIC | Age: 35
End: 2025-06-09
Payer: COMMERCIAL

## 2025-06-09 DIAGNOSIS — M25.572 ACUTE LEFT ANKLE PAIN: ICD-10-CM

## 2025-06-09 DIAGNOSIS — M76.62 LEFT ACHILLES TENDINITIS: Primary | ICD-10-CM

## 2025-06-09 PROCEDURE — 99499 UNLISTED E&M SERVICE: CPT | Mod: 93,,, | Performed by: ORTHOPAEDIC SURGERY

## 2025-06-09 NOTE — PROGRESS NOTES
Audio Only Telehealth Visit     The patient location is:  [place]  The chief complaint leading to consultation is:  MRI results  Visit type: Virtual visit with audio only (telephone)  Total time spent in medical discussion with patient:  Five minutes  Total time spent on date of the encounter:  Five minutes       The reason for the audio only service rather than synchronous audio and video virtual visit was related to technical difficulties or patient preference/necessity.       Each patient to whom I provide medical services by telemedicine is:  (1) informed of the relationship between the physician and patient and the respective role of any other health care provider with respect to management of the patient; and (2) notified that they may decline to receive medical services by telemedicine and may withdraw from such care at any time. Patient verbally consented to receive this service via voice-only telephone call.       HPI:  This is a 34-year-old female who presented to me on 04/07/2025 for chronic left Achilles tendon pain as well as a more recent onset of midfoot and anterior ankle pain that had begun about two months prior to her initial visit.  X-rays of the left foot and ankle did not show any obvious abnormalities.  My initial impression was left Achilles tendinitis and I recommended a four week course of boot immobilization.  When she returned to see me on 05/19/2025 she reported that her Achilles tendon was improved but she was still having anterior ankle and dorsal foot pain.  I ordered an MRI scan to further evaluate her foot and ankle.    MRI results:  MRI of the left ankle performed on 05/29/2025 does not reveal any obvious abnormalities related to the ankle or the tarsal joints.  The Achilles tendon revealed low-grade tendinosis without any focal tear or peritendinitis.     Assessment and plan:    1. Left Achilles tendinitis        2. Acute left ankle pain      No obvious structural abnormalities  on MRI or plain x-ray        Recommendation:  Results of the MRI were discussed.  I explained to her that there are no obvious structural abnormalities that would explain her recent ankle pain.  The Achilles tendon only shows low-grade tendinosis and clinically she was doing better when I saw her.  I would recommend some physical therapy along with a home exercise program and I encouraged her to continue to wean out of the boot.      Follow-up in six weeks if necessary                        This service was not originating from a related E/M service provided within the previous 7 days nor will  to an E/M service or procedure within the next 24 hours or my soonest available appointment.  Prevailing standard of care was able to be met in this audio-only visit.

## 2025-06-24 ENCOUNTER — PATIENT MESSAGE (OUTPATIENT)
Dept: PAIN MEDICINE | Facility: OTHER | Age: 35
End: 2025-06-24
Payer: COMMERCIAL

## 2025-06-25 ENCOUNTER — PATIENT MESSAGE (OUTPATIENT)
Dept: PAIN MEDICINE | Facility: OTHER | Age: 35
End: 2025-06-25
Payer: COMMERCIAL

## 2025-07-02 ENCOUNTER — OFFICE VISIT (OUTPATIENT)
Dept: PAIN MEDICINE | Facility: CLINIC | Age: 35
End: 2025-07-02
Payer: COMMERCIAL

## 2025-07-02 DIAGNOSIS — F44.4 FUNCTIONAL NEUROLOGICAL SYMPTOM DISORDER WITH ABNORMAL MOVEMENT: Primary | ICD-10-CM

## 2025-07-02 DIAGNOSIS — R53.83 FATIGUE, UNSPECIFIED TYPE: ICD-10-CM

## 2025-07-02 DIAGNOSIS — F41.0 GENERALIZED ANXIETY DISORDER WITH PANIC ATTACKS: ICD-10-CM

## 2025-07-02 DIAGNOSIS — R68.89 DECREASED STRENGTH, ENDURANCE, AND MOBILITY: ICD-10-CM

## 2025-07-02 DIAGNOSIS — F33.1 MODERATE EPISODE OF RECURRENT MAJOR DEPRESSIVE DISORDER: ICD-10-CM

## 2025-07-02 DIAGNOSIS — R53.1 DECREASED STRENGTH, ENDURANCE, AND MOBILITY: ICD-10-CM

## 2025-07-02 DIAGNOSIS — Z74.09 DECREASED STRENGTH, ENDURANCE, AND MOBILITY: ICD-10-CM

## 2025-07-02 DIAGNOSIS — F41.1 GENERALIZED ANXIETY DISORDER WITH PANIC ATTACKS: ICD-10-CM

## 2025-07-02 PROCEDURE — 98005 SYNCH AUDIO-VIDEO EST LOW 20: CPT | Mod: 95,,, | Performed by: NURSE PRACTITIONER
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
## 2025-07-06 ENCOUNTER — PATIENT MESSAGE (OUTPATIENT)
Facility: CLINIC | Age: 35
End: 2025-07-06
Payer: COMMERCIAL

## 2025-07-07 ENCOUNTER — OFFICE VISIT (OUTPATIENT)
Facility: CLINIC | Age: 35
End: 2025-07-07
Payer: COMMERCIAL

## 2025-07-07 DIAGNOSIS — G43.E09 CHRONIC MIGRAINE WITH AURA WITHOUT STATUS MIGRAINOSUS, NOT INTRACTABLE: Primary | ICD-10-CM

## 2025-07-07 DIAGNOSIS — R11.0 NAUSEA: ICD-10-CM

## 2025-07-07 PROCEDURE — G2211 COMPLEX E/M VISIT ADD ON: HCPCS | Mod: 95,,, | Performed by: PHYSICIAN ASSISTANT

## 2025-07-07 PROCEDURE — 98006 SYNCH AUDIO-VIDEO EST MOD 30: CPT | Mod: 95,,, | Performed by: PHYSICIAN ASSISTANT

## 2025-07-07 RX ORDER — ONDANSETRON 4 MG/1
4 TABLET, ORALLY DISINTEGRATING ORAL EVERY 6 HOURS PRN
Qty: 20 TABLET | Refills: 5 | Status: SHIPPED | OUTPATIENT
Start: 2025-07-07 | End: 2026-07-07

## 2025-07-07 NOTE — PROGRESS NOTES
Established Patient   The patient location is: [place]  The chief complaint leading to consultation is: ha f/up    Visit type: audiovisual    Face to Face time with patient: 13 min  20 minutes of total time spent on the encounter, which includes face to face time and non-face to face time preparing to see the patient (eg, review of tests), Obtaining and/or reviewing separately obtained history, Documenting clinical information in the electronic or other health record, Independently interpreting results (not separately reported) and communicating results to the patient/family/caregiver, or Care coordination (not separately reported).         Each patient to whom he or she provides medical services by telemedicine is:  (1) informed of the relationship between the physician and patient and the respective role of any other health care provider with respect to management of the patient; and (2) notified that he or she may decline to receive medical services by telemedicine and may withdraw from such care at any time.    Notes:     SUBJECTIVE:  Patient ID: Stephan Wallace   Chief Complaint: Headache    History of Present Illness:  Stephan Wallace is a 35 y.o. female with migraines, depression, anxiety, functional neurological movements, who presents to clinic with father for follow-up of headaches.       07/07/2025 - Interval History:  Per HA diary, pt has had 20-21/30 ha days per month which she contributes to known triggers of summer season and stress. She currently manages w/ psychiatrist and is taking auvelity daily and prn valium.   Ubrelvy 100mg - sometimes helps  Imitrex - sometimes helps  Plan: continue emgality, start botox, continue limited imitrex prn vs ubrelvy 100mg prn, continue to track ha's, rtc 1 mo to begin botox    04/29/2025 - Interval History:  Per HA diary, pt has been experiencing 18-23/30 ha days per month. She felt medrol dose pack didn't help. She started emgality at the end of march and  completed her 2nd round last week. In the last 30 days, she has had 16/30 ha days, mild to severe, lasting 21 hours - 3 days. She has been unable to receive ubrelvy thus far, called pharmacy w/ pt, will be ready for pickup today.  PA will be needed next month. Pt to msg when pa is sent. Of note, Yesterday pt struck the top of her head on a medicine cabinet upon standing. Denies LOC, neck pain/stiffness. She was seen in the ED yesterday w/ no neuro deficits on exam and small tender abrasion noted on her crown w/ no active bleeding. She was given IV toradol, IV compazine, and a dose of fioricet w/ improvement noted to HA, discharged w/ fioricet and zofran. She is still having HA today but denies all other ROS or associated symptoms. Pt to continue monitoring and inform clinic if symptoms occur and pcs clinic referral is needed.   Plan: continue emgality, continue limited imitrex prn, try ubrelvy 50mg prn, continue to track ha's, rtc 3 mo or sooner if needed    04/04/2025 - Interval History:  Ha's have worsened in the last month gradually ramping up to approximately 4-6 ha days per week. She has been taking imitrex which helps, but only receives so many tablets per month. She was unable to receive ubrelvy 50mg prn from [facility] and is unsure why. Advised pt to reach out to pharmacy to inquire further. She was able to start quilipta and found it helpful, but had to d/c sometime after last visit due to it no longer being covered by insurance. She is amenable to switching back to emgality as thi shelped in the past. She has also been suffering w/ an intractable HA since Sunday. Will send medrol dose pack to break current cycle. Discussed disability paperwork and advised fmla/intermittent leave for ha's.   Plan: medrol dose pack to break current cycle, start emgality, continue limited imitrex prn, try ubrelvy 50mg prn if approved continue to track ha's, rtc 6 mo or sooner if needed    11/18/2024 - Interval History:  Ha's  have improved. Per HA diary, are now occurring 8/30 days per month. Has been unable to obtain ubrelvy 2/2 to PA. Is taking imitrex as needed.   Plan: continue quilipta 60mg/d, try ubrelvy 50mg prn, continue imitrex tabs prn, continue to track ha's, rtc 6-12 mo or sooner if needed    08/02/2024 - Interval History:  Ha's have improved since starting quilipta, per HA diary they are occurring 13-16/30 days per month. Insurance requires scripts sent to [facility]. Was unable to receive the Imitrex NS.   Ubrelvy 50mg - cannot recall trying it  Imitrex 100mg- helps  Quilipta 30mg- helps  Plan: increase quilipta 60mg/d, try ubrelvy 50mg prn, continue imitrex tabs prn, continue to track ha's, rtc 2-3 mo or sooner if needed    4/12/24 Telephone Encounter:   Aimovig is complete plan/benefit exclusion. Qulipta is alternative.   Called pt and informed her, she is willing to try qulipta.   Started quilipta 30mg.     4/10/24 Telephone Encounter:   Called pt.   Insurance will not cover nurtec unless she has tried aimovig and ubrelvy.   Insurance will not cover ubrelvy unless she has tried 2 triptans. Has tried imitrex thus far.   Pt has been informed. She would like to try aimovig at this time.     04/05/2024 - Interval History:  Pt not currently tracking. Is unsure of frequency. Did have 2 HA's last week lasting 2 days each. Had 6 HA's in the last month lasting days each. Discussed options w/ pt. Including preventatives and abortives and previous insurance responses (nurtec not approved until tried aimovig and ubrelvy first). Pt would like to try ubrelvy and track ha's.   Plan: try ubrelvy 50mg prn, continue imitrex tabs prn, track ha's, rtc 2-3 mo or sooner if needed    12/11/2023 - Interval History:  Pt has not been taking emgality for many months. Is unsure when she stopped taking it. Of note, they were very painful for her, she would like to avoid injectable options if possible. Ha's are occurring approximately 8/30 days per month.  Pt is tracking, will send diary via [employer].   Nurtec - hasn't tried yet  Imitrex NS - hasn't tried yet  Imitrex tabs - help better than anything else taken so far  Plan: try nurtec preventatively, continue imitrex tabs prn, track ha's, rtc 2-3 mo or sooner if needed    11/13/2023 - Interval History:  Pt's ha's worsened in the last month, denies any changes at the time. Ha's occurring 17/30 days this month. Imitrex is helping, but is temporary.   Discussed otpions. Would like to try nurtec instead of emgality 2/2 painful when injecting.   Plan: try nurtec preventatively (if insurance denies, is amenable to injectable cgrp inhibitor), continue imitrex tabs prn, will try imitrex NS as 2nd line, rtc 2-3 mo or sooner if needed    06/20/2023 - Interval History:  Pt completed 3 rounds of emgality after last visit which helped greatly. She stopped taking it approximately 3 months ago. Ha's have been well controlled since last visit, occurring 1-2 times a month. Resolvign w/ imitrex.   Continues to follow w/ neurology regarding abnormal movements.   Plan: continue imitrex prn, continue neuro recs, rtc prn    Recommendations made at last Office Visit on 1/25/23:  - Discussed symptoms appear to be consistent with migraines, discussed treatment options and patient agreed with the following plan:  - ppx - start emgality  - abortive - continue imitrex 100mg tab prn  - anxiety/depression - continue cymbalta 60mg/d, mgmt per psychiatrist  - risks, benefits, and potential side effects of emgality, imitrex discussed   - alternative treatment options offered   - importance of healthy diet, regular exercise and sleep hygiene in the treatment of headaches    - Start tracking headaches via Migraine Alexis ifrah on phone   - RTC in 4 mo or sooner if needed     Treatments Tried:  Emgality -  helps somewhat,  painful  Tpx - face tingling  Cymbalta 60mg/d - helps depression/anxiety  Auvelity (dextromethorphan/bupropion)  Quilipta 60mg - helps,  no longer covered by insurance  Imitrex 100mg - helps somewaht  Ubrelvy 100mg - helps somewhat  Excedrin  Advil   Medrol dose pack - didn't help  Zofran 4mg - helps    Current Medications:    Current Outpatient Medications:     AUVELITY  mg TbIE, Take  mg by mouth 2 (two) times a day., Disp: , Rfl:     diazePAM (VALIUM) 5 MG tablet, Take 5 mg by mouth every 6 (six) hours as needed for Anxiety., Disp: , Rfl:     drospirenone-ethinyl estradioL (FRANCESCA) 3-0.03 mg per tablet, Take 1 tablet by mouth once daily., Disp: 30 tablet, Rfl: 11    drospirenone-ethinyl estradioL (FRANCESCA) 3-0.03 mg per tablet, TAKE 1 TABLET BY MOUTH EVERY DAY, Disp: 84 tablet, Rfl: 1    galcanezumab-gnlm 120 mg/mL PnIj, Inject 1 mL (120 mg total) into the skin every 28 days. Begin 28 days after loading dose., Disp: 1 mL, Rfl: 11    naloxone (NARCAN) 4 mg/actuation Spry, 4mg by nasal route as needed for opioid overdose; may repeat every 2-3 minutes in alternating nostrils until medical help arrives. Call 911, Disp: 1 each, Rfl: 11    ondansetron (ZOFRAN-ODT) 4 MG TbDL, Take 1 tablet (4 mg total) by mouth every 6 (six) hours as needed (for nausea)., Disp: 20 tablet, Rfl: 5    sumatriptan (IMITREX) 100 MG tablet, Take 1 tab PO at onset of migraine, can repeat in 2 hrs if needed.  No more than twice per day or 3 days/wk., Disp: 18 tablet, Rfl: 5    ubrogepant (UBRELVY) 100 mg tablet, Take 1 tablet by mouth at the onset of a headache. May repeat based on response and tolerability after more than 2 hours if needed. Do not take more than 200mg in a 24 hour span., Disp: 16 tablet, Rfl: 5    Review of Systems - as per HPI, otherwise a balanced 10 systems review is negative.    OBJECTIVE:  Vitals:  There were no vitals taken for this visit.     Physical Exam:  Constitutional: she appears well-developed and well-nourished. she is well groomed. NAD   HENT:    Head: Normocephalic and atraumatic  Eyes: Conjunctivae and EOM are  normal  Musculoskeletal: Normal range of motion. No joint stiffness.   Skin: Skin is warm and dry.  Psychiatric: Mood and affect are normal    Neuro: Patient is alert and oriented to person, place, and time. Language is intact and fluent. Speech is clear and fluent. Recent and remote memory are intact.  Normal attention and concentration.  Facial movement is symmetric. Moves all 4 extremities against gravity. Gait and station normal.  Cranial Nerves II through XII without focal deficit.     Review of Data:   Notes from neuro reviewed   Labs:  Admission on 04/28/2025, Discharged on 04/28/2025   Component Date Value Ref Range Status    POC Preg Test, Ur 04/28/2025 Negative  Negative Final     Acceptable 04/28/2025 Yes   Final     Imaging:  Results for orders placed or performed during the hospital encounter of 07/06/22   CT Head Without Contrast    Narrative    EXAMINATION:  CT HEAD WITHOUT CONTRAST    CLINICAL HISTORY:  Headache, sudden, severe;    TECHNIQUE:  Low dose axial images were obtained through the head.  Coronal and sagittal reformations were also performed. Contrast was not administered.    COMPARISON:  None.    FINDINGS:  The subcutaneous tissues are unremarkable.  The bony calvarium is intact.  The paranasal sinuses are unremarkable.  The mastoid air cells are clear.  The orbits and intraorbital contents are unremarkable.    The craniocervical junction is intact.  The midline structures are unremarkable.  There are no extra-axial fluid collections.  There is no evidence of intracranial hemorrhage.  The ventricles and sulci are within normal limits.  The cisterns are unremarkable.  The gray-white differentiation is maintained.  There is no dense vessel sign.  There is no evidence of mass effect.      Impression    No acute process.  Follow-up, as clinically warranted.      Electronically signed by: Adam De MD  Date:    07/06/2022  Time:    18:28     Note: I have independently reviewed  any/all imaging/labs/tests and agree with the report (s) as documented.  Any discrepancies will be as noted/demarcated by free text.  JOSUÉ RODRIGUEZ 7/7/2025    ASSESSMENT:  1. Chronic migraine with aura without status migrainosus, not intractable    2. Nausea        BOTOX  The patient has chronic migraines (G43.719) and suffers from headaches more than 15 days a month lasting more than 4 hours a day with no relief of symptoms despite trying multiple medications (listed above). Botox treatment was approved for chronic migraines in October 2010. The patient will be an ideal candidate for Botox. We are planning for 3 treatments 3 months apart and aiming for at least 50% improvement in the symptoms. If we see no improvement after 3 treatments, we will discontinue the injections.  Injection sites planned:   muscle bilaterally ( a total of 10 units divided into 2 sites)   Procerus muscle (5 units)   Frontalis muscle bilaterally (a total of 20 units divided into 4 sites)   Temporalis muscle bilaterally (a total of 40 units divided into 8 sites)   Occipitalis muscle bilaterally (a total of 30 units divided into 6 sites)   Cervical paraspinal muscles (a total of 20 units divided into 4 sites)   Trapezius muscle bilaterally (a total of 30 units divided into 6 sites)   Total Botox to be used: 155 Units   Unavoidable waste: 45 Units       PLAN:  - Discussed symptoms appear to be consistent with migraines, discussed treatment options and patient agreed with the following plan:  - ppx - continue emgality, start botox  - abortive - continue imitrex 100mg tab prn vs ubrelvy 100mg prn  - anxiety/depression - mgmt per psychiatrist  - abnormal movements - mgmt per neuro  - continue to track ha's  - Discussed goals of therapy are to decrease the frequency, intensity, and duration of headaches  - RTC 1 mo     Orders Placed This Encounter    Prior authorization Order    Prior authorization Order    ondansetron (ZOFRAN-ODT) 4 MG  TbDL         Questions and concerns were sought and answered to the patient's stated verbal satisfaction.  The patient verbalizes understanding and agreement with the above stated treatment plan.     CC: Teena Kothari MD Sarena Patel, PA-C  [facility]   [phone]    Dr. Doyle was available during today's encounter.       Visit today included increased complexity associated with the care of the episodic problem migraines addressed and managing the longitudinal care of the patient due to the serious and/or complex managed problem(s) migraines.

## 2025-07-22 ENCOUNTER — PATIENT MESSAGE (OUTPATIENT)
Dept: SPORTS MEDICINE | Facility: CLINIC | Age: 35
End: 2025-07-22
Payer: COMMERCIAL

## 2025-07-23 ENCOUNTER — OFFICE VISIT (OUTPATIENT)
Dept: SPORTS MEDICINE | Facility: CLINIC | Age: 35
End: 2025-07-23
Payer: COMMERCIAL

## 2025-07-23 ENCOUNTER — HOSPITAL ENCOUNTER (OUTPATIENT)
Dept: RADIOLOGY | Facility: HOSPITAL | Age: 35
Discharge: HOME | End: 2025-07-23
Attending: ORTHOPAEDIC SURGERY
Payer: COMMERCIAL

## 2025-07-23 VITALS
BODY MASS INDEX: 46.47 KG/M2 | HEART RATE: 92 BPM | WEIGHT: 289.13 LBS | HEIGHT: 66 IN | SYSTOLIC BLOOD PRESSURE: 127 MMHG | DIASTOLIC BLOOD PRESSURE: 87 MMHG

## 2025-07-23 DIAGNOSIS — M25.552 PAIN OF LEFT HIP: Primary | ICD-10-CM

## 2025-07-23 DIAGNOSIS — M25.552 PAIN OF LEFT HIP: ICD-10-CM

## 2025-07-23 PROCEDURE — 73502 X-RAY EXAM HIP UNI 2-3 VIEWS: CPT | Mod: TC,LT

## 2025-07-23 PROCEDURE — 99999 PR PBB SHADOW E&M-EST. PATIENT-LVL IV: CPT | Mod: PBBFAC,,, | Performed by: ORTHOPAEDIC SURGERY

## 2025-07-23 PROCEDURE — 73502 X-RAY EXAM HIP UNI 2-3 VIEWS: CPT | Mod: 26,LT,, | Performed by: RADIOLOGY

## 2025-07-23 RX ORDER — MELOXICAM 15 MG/1
15 TABLET ORAL DAILY
COMMUNITY

## 2025-07-23 RX ORDER — CELECOXIB 200 MG/1
200 CAPSULE ORAL 2 TIMES DAILY WITH MEALS
Qty: 60 CAPSULE | Refills: 0 | Status: SHIPPED | OUTPATIENT
Start: 2025-07-23

## 2025-07-23 RX ORDER — METHOCARBAMOL 500 MG/1
750 TABLET, FILM COATED ORAL 4 TIMES DAILY
COMMUNITY

## 2025-07-23 NOTE — LETTER
Patient: Stephan Wallace   YOB: 1990   Clinic Number: [id number]   Today's Date: July 23, 2025        Certificate to Return to Work     Stephan Cleaning was seen by Garo Robertson PA-C on 7/23/2025.    Stephan Cleaning can return to work on 7/24/25 with full duty until 7/30/25.    Specific restrictions: NO LIFTING, BENDING, TWISTING,ETC. Rest as needed    If you have any questions or concerns, please feel free to contact the office at [phone].    Thank you.    Garo Robertson PA-C        Signature:  __________________________________________________

## 2025-07-23 NOTE — PROGRESS NOTES
Subjective:     Chief Complaint: Stephan Wallace is a 35 y.o. female who had concerns including Pain of the Left Hip.    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 Patient states she was lifting a heavy tire mild sudden pain over the lateral posterior aspect of her hip.  She denies any bruising after the injury.  She states pain began over the lateral aspect of the hip but has since radiated toward the posterior and superior aspects over her pelvis.  The pain is now very generalized, it can not pinpoint to one specific area.  She states pain always present made worse with any hip movement, but also when sitting and laying down.  She rates the pain as 5/10 today.  She has attempted multiple conservative measures that include activity modification, ice & elevation, and oral medications (meloxicam), with little relief.  Is affecting ADLs and limiting desired level of activity. Denies numbness or tingling. She is here today to discuss treatment options.    No previous surgeries or trauma on bilateral hips    Review of Systems   Constitutional: Negative.   HENT: Negative.     Eyes: Negative.    Cardiovascular: Negative.    Respiratory: Negative.     Endocrine: Negative.    Hematologic/Lymphatic: Negative.    Skin: Negative.    Musculoskeletal:  Positive for myalgias. Negative for joint pain, joint swelling and muscle weakness.   Neurological: Negative.    Psychiatric/Behavioral: Negative.     Allergic/Immunologic: Negative.                  Objective:     General: Stephan is well-developed, well-nourished, appears stated age, in no acute distress, alert and oriented to time, place and person.     General    Nursing note and vitals reviewed.  Constitutional: She is oriented to person, place, and time. She appears well-developed and well-nourished. No distress.   HENT:   Head: Normocephalic and atraumatic.   Nose: Nose normal.   Eyes: EOM are normal.    Cardiovascular:  Intact distal pulses.            Pulmonary/Chest: Effort normal. No respiratory distress.   Neurological: She is alert and oriented to person, place, and time.   Psychiatric: She has a normal mood and affect. Her behavior is normal. Judgment and thought content normal.           Right Knee Exam     Inspection   Alignment:  normal  Effusion: absent    Left Knee Exam     Inspection   Alignment:  normal  Effusion: absent    Right Hip Exam     Inspection   Scars: absent  Swelling: absent  Bruising: absent  No deformity of hip.  Quadriceps Atrophy:  Negative  Erythema: absent    Range of Motion   Abduction:  45   Adduction:  30   Extension:  20   Flexion:  120   External rotation:  80   Internal rotation:  30     Tests   Pain w/ forced internal rotation (TELMA): absent  Pain w/ forced external rotation (FADIR): absent  Kenney: negative  Trendelenburg Test: negative  Circumduction test: negative  Stinchfield test: negative  Log Roll: negative    Other   Sensation: normal  Left Hip Exam     Inspection   Scars: absent  Swelling: absent  No deformity of hip.  Quadriceps Atrophy:  negative  Erythema: absent  Bruising: absent    Range of Motion   Abduction:  45 normal   Adduction:  30 normal   Extension:  20 normal   Flexion:  120 normal   External rotation:  50 normal   Internal rotation: 20 normal     Tests   Pain w/ forced internal rotation (TELMA): present  Pain w/ forced external rotation (FADIR): present  Kenney: negative  Trendelenburg Test: negative  Circumduction test: negative  Stinchfield test: positive  Log Roll: negative    Other   Sensation: normal          Muscle Strength   Right Lower Extremity   Hip Abduction: 5/5   Hip Adduction: 5/5   Hip Flexion: 5/5   Ankle Dorsiflexion:  5/5   Left Lower Extremity   Hip Abduction: 5/5   Hip Adduction: 5/5   Hip Flexion: 4/5   Ankle Dorsiflexion:  5/5     Vascular Exam     Right Pulses  Dorsalis Pedis:      2+  Posterior Tibial:      2+        Left  Pulses  Dorsalis Pedis:      2+  Posterior Tibial:      2+        Capillary Refill  Right Hand: normal capillary refill  Left Hand: normal capillary refill        Edema  Right Upper Leg: absent  Left Upper Leg: absent    Radiographs left hip     My interpretation:    No signs of fracture, contusion, swelling, DJD, or any other bony abnormalities noted.        Assessment:     Encounter Diagnosis   Name Primary?    Pain of left hip Yes        Plan:     1. I made the decision to order new imaging of the extremity or extremities evaluated. I independently reviewed and interpreted the radiographs and/or MRIs today. These images were shown to the patient where I then discussed my findings in detail.    2. We discussed at length different treatment options including conservative vs surgical management. These include anti-inflammatories, acetaminophen, rest, ice, heat, formal physical therapy including strengthening and stretching exercises, home exercise programs, dry needling, and finally surgical intervention.  Patient is patient's history and physical exam findings, explained that the chance of significant soft tissue injury is low, however we could MRI to rule this out.  Patient would like to hold off on MRI for now.  I recommended conservative measures to include formal physical therapy as well as refraining does have strenuous lower body activity at work.  Patient states she does not have time for physical therapy at the moment, but may be able to take leave in the future.  She also does not believe she can significant time off work, but is interested in a home exercise program.    3. Ice compress to the affected area 2-3x a day for 15-20 minutes as needed for pain management.  Celebrex 200 mg b.i.d. with meals    4. Letter was given to patient stating to refrain from any strenuous lower extremity exercises for 1 week.      RTC to see Raza Robertson PA-C in prn.  Patient will contact our clinic in future if she  would like to schedule formal physical therapy.      All of the patient's questions were answered. Patient was advised to call the clinic or contact me through the patient portal for any questions or concerns.       Medical Dictation software was used during the dictation of portions or the entirety of this medical record.  Phonetic or grammatic errors may exist due to the use of this software. For clarification, refer to the author of the document.        Patient questionnaires may have been collected.

## 2025-07-30 ENCOUNTER — PATIENT MESSAGE (OUTPATIENT)
Facility: CLINIC | Age: 35
End: 2025-07-30
Payer: COMMERCIAL

## 2025-08-02 ENCOUNTER — PATIENT MESSAGE (OUTPATIENT)
Facility: CLINIC | Age: 35
End: 2025-08-02
Payer: COMMERCIAL

## 2025-08-05 ENCOUNTER — PROCEDURE VISIT (OUTPATIENT)
Facility: CLINIC | Age: 35
End: 2025-08-05
Payer: COMMERCIAL

## 2025-08-05 VITALS
HEART RATE: 90 BPM | SYSTOLIC BLOOD PRESSURE: 146 MMHG | BODY MASS INDEX: 46.45 KG/M2 | HEIGHT: 66 IN | DIASTOLIC BLOOD PRESSURE: 93 MMHG | WEIGHT: 289 LBS

## 2025-08-05 DIAGNOSIS — G43.E09 CHRONIC MIGRAINE WITH AURA WITHOUT STATUS MIGRAINOSUS, NOT INTRACTABLE: Primary | ICD-10-CM

## 2025-08-05 RX ORDER — DICLOFENAC SODIUM 10 MG/G
GEL TOPICAL 4 TIMES DAILY
COMMUNITY
Start: 2025-07-15

## 2025-08-05 RX ORDER — BUSPIRONE HYDROCHLORIDE 7.5 MG/1
7.5 TABLET ORAL
COMMUNITY
Start: 2025-07-08

## 2025-08-05 NOTE — PROCEDURES
PROCEDURE NOTE:  BOTOX was performed as an indicated therapy for intractable chronic migraine headaches given that the patient failed more than 2 headache medications     A time out was conducted just before the start of the procedure to verify the correct patient and procedure, procedure location, and all relevant critical information.      Botulinum Toxin Injection Procedure      PROCEDURE PERFORMED: Botulinum toxin injection (45802)  CLINICAL INDICATION: Chronic Migraines  After risks and benefits were explained including bleeding, infection, worsening of pain, damage to the areas being injected, weakness of muscles, loss of muscle control, dysphagia if injecting the head or neck, facial droop if injecting the facial area, painful injection, allergic or other reaction to the medications being injected, and the failure of the procedure to help the problem, a signed consent was obtained.   The patient was placed in a comfortable area and the sites to be treated were identified.The area to be treated was prepped three times with alcohol and the alcohol allowed to dry. Next, a 30 gauge needle was used to inject the medication in the area to be treated.      Total Botox used: 155 Units   Unavoidable waste: 45 Units      Injection sites:    muscle bilaterally ( a total of 10 units divided into 2 sites)   Procerus muscle (5 units)   Frontalis muscle bilaterally (a total of 20 units divided into 4 sites)   Temporalis muscle bilaterally (a total of 40 units divided into 8 sites)   Occipitalis muscle bilaterally (a total of 30 units divided into 6 sites)   Cervical paraspinal muscles (a total of 20 units divided into 4 sites)   Trapezius muscle bilaterally (a total of 30 units divided into 6 sites)   Complications: none   RTC for the next Botox injection: 12 weeks     Problem List Items Addressed This Visit    None  Visit Diagnoses         Chronic migraine with aura without status migrainosus, not intractable     -  Primary    Relevant Medications    onabotulinumtoxina injection 200 Units (Completed) (Start on 8/5/2025  3:15 PM)              Marley Gudino PA-C  [facility]   [phone]    Dr. Doyle was available during today's encounter.

## 2025-08-08 ENCOUNTER — PATIENT MESSAGE (OUTPATIENT)
Facility: CLINIC | Age: 35
End: 2025-08-08
Payer: COMMERCIAL

## 2025-08-11 ENCOUNTER — PATIENT MESSAGE (OUTPATIENT)
Dept: SPORTS MEDICINE | Facility: CLINIC | Age: 35
End: 2025-08-11
Payer: COMMERCIAL

## 2025-08-13 DIAGNOSIS — Z74.09 DECREASED STRENGTH, ENDURANCE, AND MOBILITY: ICD-10-CM

## 2025-08-13 DIAGNOSIS — R53.1 DECREASED STRENGTH, ENDURANCE, AND MOBILITY: ICD-10-CM

## 2025-08-13 DIAGNOSIS — R68.89 DECREASED STRENGTH, ENDURANCE, AND MOBILITY: ICD-10-CM

## 2025-08-13 DIAGNOSIS — R53.83 FATIGUE, UNSPECIFIED TYPE: ICD-10-CM

## 2025-08-13 DIAGNOSIS — F44.4 FUNCTIONAL NEUROLOGICAL SYMPTOM DISORDER WITH ABNORMAL MOVEMENT: Primary | ICD-10-CM

## 2025-08-18 ENCOUNTER — PATIENT MESSAGE (OUTPATIENT)
Dept: PAIN MEDICINE | Facility: CLINIC | Age: 35
End: 2025-08-18
Payer: COMMERCIAL

## 2025-08-18 ENCOUNTER — OFFICE VISIT (OUTPATIENT)
Dept: ORTHOPEDICS | Facility: CLINIC | Age: 35
End: 2025-08-18
Payer: COMMERCIAL

## 2025-08-18 VITALS — BODY MASS INDEX: 46.45 KG/M2 | HEIGHT: 66 IN | WEIGHT: 289 LBS

## 2025-08-18 DIAGNOSIS — M76.62 LEFT ACHILLES TENDINITIS: Primary | ICD-10-CM

## 2025-08-18 PROCEDURE — 99213 OFFICE O/P EST LOW 20 MIN: CPT | Mod: S$GLB,,, | Performed by: ORTHOPAEDIC SURGERY

## 2025-08-18 PROCEDURE — 99999 PR PBB SHADOW E&M-EST. PATIENT-LVL IV: CPT | Mod: PBBFAC,,, | Performed by: ORTHOPAEDIC SURGERY

## 2025-08-21 ENCOUNTER — OFFICE VISIT (OUTPATIENT)
Dept: FAMILY MEDICINE | Facility: CLINIC | Age: 35
End: 2025-08-21
Payer: COMMERCIAL

## 2025-08-21 ENCOUNTER — LAB VISIT (OUTPATIENT)
Dept: LAB | Facility: HOSPITAL | Age: 35
End: 2025-08-21
Attending: FAMILY MEDICINE
Payer: COMMERCIAL

## 2025-08-21 VITALS
HEART RATE: 88 BPM | BODY MASS INDEX: 46.56 KG/M2 | TEMPERATURE: 98 F | SYSTOLIC BLOOD PRESSURE: 116 MMHG | OXYGEN SATURATION: 95 % | DIASTOLIC BLOOD PRESSURE: 70 MMHG | HEIGHT: 66 IN | WEIGHT: 289.69 LBS

## 2025-08-21 DIAGNOSIS — M62.838 TRAPEZIUS MUSCLE SPASM: ICD-10-CM

## 2025-08-21 DIAGNOSIS — Z00.00 ANNUAL PHYSICAL EXAM: ICD-10-CM

## 2025-08-21 DIAGNOSIS — Z00.00 ANNUAL PHYSICAL EXAM: Primary | ICD-10-CM

## 2025-08-21 DIAGNOSIS — M25.552 PAIN OF LEFT HIP: ICD-10-CM

## 2025-08-21 LAB
ABSOLUTE EOSINOPHIL (OHS): 0.07 K/UL
ABSOLUTE MONOCYTE (OHS): 0.46 K/UL (ref 0.3–1)
ABSOLUTE NEUTROPHIL COUNT (OHS): 6.33 K/UL (ref 1.8–7.7)
ALBUMIN SERPL BCP-MCNC: 3.3 G/DL (ref 3.5–5.2)
ALP SERPL-CCNC: 87 UNIT/L (ref 40–150)
ALT SERPL W/O P-5'-P-CCNC: 11 UNIT/L (ref 10–44)
ANION GAP (OHS): 10 MMOL/L (ref 8–16)
AST SERPL-CCNC: 19 UNIT/L (ref 11–45)
BASOPHILS # BLD AUTO: 0.04 K/UL
BASOPHILS NFR BLD AUTO: 0.4 %
BILIRUB SERPL-MCNC: 0.2 MG/DL (ref 0.1–1)
BUN SERPL-MCNC: 14 MG/DL (ref 6–20)
CALCIUM SERPL-MCNC: 9.2 MG/DL (ref 8.7–10.5)
CHLORIDE SERPL-SCNC: 106 MMOL/L (ref 95–110)
CHOLEST SERPL-MCNC: 174 MG/DL (ref 120–199)
CHOLEST/HDLC SERPL: 3.3 {RATIO} (ref 2–5)
CO2 SERPL-SCNC: 24 MMOL/L (ref 23–29)
CREAT SERPL-MCNC: 0.9 MG/DL (ref 0.5–1.4)
ERYTHROCYTE [DISTWIDTH] IN BLOOD BY AUTOMATED COUNT: 13.5 % (ref 11.5–14.5)
GFR SERPLBLD CREATININE-BSD FMLA CKD-EPI: >60 ML/MIN/1.73/M2
GLUCOSE SERPL-MCNC: 90 MG/DL (ref 70–110)
HCT VFR BLD AUTO: 42.4 % (ref 37–48.5)
HDLC SERPL-MCNC: 53 MG/DL (ref 40–75)
HDLC SERPL: 30.5 % (ref 20–50)
HGB BLD-MCNC: 13.1 GM/DL (ref 12–16)
IMM GRANULOCYTES # BLD AUTO: 0.02 K/UL (ref 0–0.04)
IMM GRANULOCYTES NFR BLD AUTO: 0.2 % (ref 0–0.5)
LDLC SERPL CALC-MCNC: 92.4 MG/DL (ref 63–159)
LYMPHOCYTES # BLD AUTO: 2.53 K/UL (ref 1–4.8)
MCH RBC QN AUTO: 25.3 PG (ref 27–31)
MCHC RBC AUTO-ENTMCNC: 30.9 G/DL (ref 32–36)
MCV RBC AUTO: 82 FL (ref 82–98)
NONHDLC SERPL-MCNC: 121 MG/DL
NUCLEATED RBC (/100WBC) (OHS): 0 /100 WBC
PLATELET # BLD AUTO: 376 K/UL (ref 150–450)
PMV BLD AUTO: 9.9 FL (ref 9.2–12.9)
POTASSIUM SERPL-SCNC: 4.6 MMOL/L (ref 3.5–5.1)
PROT SERPL-MCNC: 7.4 GM/DL (ref 6–8.4)
RBC # BLD AUTO: 5.17 M/UL (ref 4–5.4)
RELATIVE EOSINOPHIL (OHS): 0.7 %
RELATIVE LYMPHOCYTE (OHS): 26.8 % (ref 18–48)
RELATIVE MONOCYTE (OHS): 4.9 % (ref 4–15)
RELATIVE NEUTROPHIL (OHS): 67 % (ref 38–73)
SODIUM SERPL-SCNC: 140 MMOL/L (ref 136–145)
TRIGL SERPL-MCNC: 143 MG/DL (ref 30–150)
TSH SERPL-ACNC: 2.06 UIU/ML (ref 0.4–4)
WBC # BLD AUTO: 9.45 K/UL (ref 3.9–12.7)

## 2025-08-21 PROCEDURE — 83036 HEMOGLOBIN GLYCOSYLATED A1C: CPT

## 2025-08-21 PROCEDURE — 80061 LIPID PANEL: CPT

## 2025-08-21 PROCEDURE — 84443 ASSAY THYROID STIM HORMONE: CPT

## 2025-08-21 PROCEDURE — 99999 PR PBB SHADOW E&M-EST. PATIENT-LVL IV: CPT | Mod: PBBFAC,,, | Performed by: FAMILY MEDICINE

## 2025-08-21 PROCEDURE — 84520 ASSAY OF UREA NITROGEN: CPT

## 2025-08-21 PROCEDURE — 36415 COLL VENOUS BLD VENIPUNCTURE: CPT | Mod: PO

## 2025-08-21 PROCEDURE — 85025 COMPLETE CBC W/AUTO DIFF WBC: CPT

## 2025-08-21 PROCEDURE — 99395 PREV VISIT EST AGE 18-39: CPT | Mod: S$GLB,,, | Performed by: FAMILY MEDICINE

## 2025-08-21 RX ORDER — CELECOXIB 200 MG/1
200 CAPSULE ORAL 2 TIMES DAILY WITH MEALS
Qty: 60 CAPSULE | Refills: 0 | Status: SHIPPED | OUTPATIENT
Start: 2025-08-21

## 2025-08-21 RX ORDER — METHOCARBAMOL 500 MG/1
500 TABLET, FILM COATED ORAL 2 TIMES DAILY PRN
Qty: 30 TABLET | Refills: 0 | Status: SHIPPED | OUTPATIENT
Start: 2025-08-21 | End: 2025-08-31

## 2025-08-22 LAB
EAG (OHS): 105 MG/DL (ref 68–131)
HBA1C MFR BLD: 5.3 % (ref 4–5.6)

## 2025-08-27 ENCOUNTER — CLINICAL SUPPORT (OUTPATIENT)
Dept: REHABILITATION | Facility: OTHER | Age: 35
End: 2025-08-27
Payer: COMMERCIAL

## 2025-08-27 ENCOUNTER — PATIENT MESSAGE (OUTPATIENT)
Facility: CLINIC | Age: 35
End: 2025-08-27
Payer: COMMERCIAL

## 2025-08-27 DIAGNOSIS — F44.4 FUNCTIONAL NEUROLOGICAL SYMPTOM DISORDER WITH ABNORMAL MOVEMENT: ICD-10-CM

## 2025-08-27 DIAGNOSIS — R68.89 DECREASED FUNCTIONAL ACTIVITY TOLERANCE: Primary | ICD-10-CM

## 2025-08-27 DIAGNOSIS — F33.1 MODERATE EPISODE OF RECURRENT MAJOR DEPRESSIVE DISORDER: ICD-10-CM

## 2025-08-27 DIAGNOSIS — F45.41 PAIN AGGRAVATED BY ANXIETY: ICD-10-CM

## 2025-08-27 DIAGNOSIS — G43.109 MIGRAINE WITH AURA AND WITHOUT STATUS MIGRAINOSUS, NOT INTRACTABLE: ICD-10-CM

## 2025-08-27 DIAGNOSIS — R52 PAIN AGGRAVATED BY ACTIVITIES OF DAILY LIVING: ICD-10-CM

## 2025-08-27 DIAGNOSIS — F41.9 PAIN AGGRAVATED BY ANXIETY: ICD-10-CM

## 2025-08-27 DIAGNOSIS — F41.1 GENERALIZED ANXIETY DISORDER WITH PANIC ATTACKS: ICD-10-CM

## 2025-08-27 DIAGNOSIS — F41.0 GENERALIZED ANXIETY DISORDER WITH PANIC ATTACKS: ICD-10-CM

## 2025-08-27 PROCEDURE — 97530 THERAPEUTIC ACTIVITIES: CPT

## 2025-08-27 PROCEDURE — 97167 OT EVAL HIGH COMPLEX 60 MIN: CPT

## 2025-08-28 PROBLEM — F41.9 PAIN AGGRAVATED BY ANXIETY: Status: ACTIVE | Noted: 2025-08-28

## 2025-08-28 PROBLEM — R52 PAIN AGGRAVATED BY ACTIVITIES OF DAILY LIVING: Status: ACTIVE | Noted: 2025-08-28

## 2025-08-28 PROBLEM — F45.41 PAIN AGGRAVATED BY ANXIETY: Status: ACTIVE | Noted: 2025-08-28

## 2025-08-29 ENCOUNTER — CLINICAL SUPPORT (OUTPATIENT)
Dept: REHABILITATION | Facility: OTHER | Age: 35
End: 2025-08-29
Payer: COMMERCIAL

## 2025-08-29 DIAGNOSIS — F41.9 PAIN AGGRAVATED BY ANXIETY: ICD-10-CM

## 2025-08-29 DIAGNOSIS — F45.41 PAIN AGGRAVATED BY ANXIETY: ICD-10-CM

## 2025-08-29 DIAGNOSIS — R52 PAIN AGGRAVATED BY ACTIVITIES OF DAILY LIVING: Primary | ICD-10-CM

## 2025-08-29 PROCEDURE — 97112 NEUROMUSCULAR REEDUCATION: CPT

## 2025-08-29 PROCEDURE — 97530 THERAPEUTIC ACTIVITIES: CPT

## 2025-09-03 ENCOUNTER — CLINICAL SUPPORT (OUTPATIENT)
Dept: REHABILITATION | Facility: OTHER | Age: 35
End: 2025-09-03
Payer: COMMERCIAL

## 2025-09-03 DIAGNOSIS — F41.9 PAIN AGGRAVATED BY ANXIETY: ICD-10-CM

## 2025-09-03 DIAGNOSIS — R52 PAIN AGGRAVATED BY ACTIVITIES OF DAILY LIVING: Primary | ICD-10-CM

## 2025-09-03 DIAGNOSIS — F45.41 PAIN AGGRAVATED BY ANXIETY: ICD-10-CM

## 2025-09-03 PROCEDURE — 97530 THERAPEUTIC ACTIVITIES: CPT

## 2025-09-03 PROCEDURE — 97110 THERAPEUTIC EXERCISES: CPT

## 2025-09-03 PROCEDURE — 97112 NEUROMUSCULAR REEDUCATION: CPT

## (undated) DEVICE — APPLICATOR CHLORAPREP ORN 26ML

## (undated) DEVICE — SPONGE LAP 18X18 PREWASHED

## (undated) DEVICE — TUBE TANDUM 18IN DISP

## (undated) DEVICE — SUT VICRYL PLUS 0 CT1 36IN

## (undated) DEVICE — SOL NACL IRR 1000ML BTL

## (undated) DEVICE — TIP YANKAUERS BULB NO VENT

## (undated) DEVICE — DRESSING TELFA N ADH 3X8

## (undated) DEVICE — SYR 10CC LUER LOCK

## (undated) DEVICE — GOWN NONREINF SET-IN SLV XL

## (undated) DEVICE — POSITIONER HEAD DONUT 9IN FOAM

## (undated) DEVICE — DRESSING ABSRBNT ISLAND 3.6X8

## (undated) DEVICE — SOL IRR SOD CHL .9% POUR

## (undated) DEVICE — SOL IRRI STRL WATER 1000ML

## (undated) DEVICE — CATH URETH INTMIT FEM 14FR 6IN

## (undated) DEVICE — BLANKET UPPER BODY 78.7X29.9IN

## (undated) DEVICE — SUT VICRYL CTD 2-0 GI 27 SH

## (undated) DEVICE — SUT VICRYL PLUS ANTIBACT

## (undated) DEVICE — NDL HYPO STD REG BVL 18GX1.5IN

## (undated) DEVICE — COVER OVERHEAD SURG LT BLUE

## (undated) DEVICE — Device

## (undated) DEVICE — PAD PREP CUFFED NS 24X48IN

## (undated) DEVICE — SUT VICRYL 3-0 OB 36 CT-1

## (undated) DEVICE — CANISTER SUCTION RIGID 2000CC

## (undated) DEVICE — ELECTRODE REM PLYHSV RETURN 9

## (undated) DEVICE — NDL HYPO REG 25G X 1 1/2

## (undated) DEVICE — NDL SPINAL 20GX3.5 HUB

## (undated) DEVICE — SET DISPOSABLE COLLECTION

## (undated) DEVICE — SUT 2/0 27IN PLAIN GUT CT

## (undated) DEVICE — DECANTER FLUID TRNSF WHITE 9IN

## (undated) DEVICE — PAD SANITARY OB STERILE

## (undated) DEVICE — TOWEL OR XRAY BLUE 17X26IN

## (undated) DEVICE — TRAY CATH 1-LYR URIMTR 16FR

## (undated) DEVICE — KIT CANIST SUCTION 1200CC

## (undated) DEVICE — SUT VICRYL+ 1 CT1 18IN

## (undated) DEVICE — SUT VICRYL 3-0 27 KS

## (undated) DEVICE — SUT MONOCRYL 0 CT-1 UND MON

## (undated) DEVICE — PACK ENDOSCOPY GENERAL

## (undated) DEVICE — KIT CUSTOM MINOR PROC ST

## (undated) DEVICE — JELLY LUBRICANT STERILE 4 OZ